# Patient Record
Sex: FEMALE | Race: WHITE | NOT HISPANIC OR LATINO
[De-identification: names, ages, dates, MRNs, and addresses within clinical notes are randomized per-mention and may not be internally consistent; named-entity substitution may affect disease eponyms.]

---

## 2017-01-10 ENCOUNTER — RX RENEWAL (OUTPATIENT)
Age: 72
End: 2017-01-10

## 2017-01-25 ENCOUNTER — APPOINTMENT (OUTPATIENT)
Dept: GASTROENTEROLOGY | Facility: CLINIC | Age: 72
End: 2017-01-25

## 2017-01-25 VITALS
HEART RATE: 76 BPM | TEMPERATURE: 98.2 F | OXYGEN SATURATION: 99 % | RESPIRATION RATE: 14 BRPM | DIASTOLIC BLOOD PRESSURE: 70 MMHG | SYSTOLIC BLOOD PRESSURE: 134 MMHG | BODY MASS INDEX: 20.83 KG/M2 | HEIGHT: 65 IN | WEIGHT: 125 LBS

## 2017-02-08 ENCOUNTER — APPOINTMENT (OUTPATIENT)
Age: 72
End: 2017-02-08

## 2017-03-19 ENCOUNTER — TRANSCRIPTION ENCOUNTER (OUTPATIENT)
Age: 72
End: 2017-03-19

## 2017-05-02 ENCOUNTER — MESSAGE (OUTPATIENT)
Age: 72
End: 2017-05-02

## 2017-05-03 ENCOUNTER — APPOINTMENT (OUTPATIENT)
Dept: INTERNAL MEDICINE | Facility: CLINIC | Age: 72
End: 2017-05-03

## 2017-05-03 VITALS — BODY MASS INDEX: 21.33 KG/M2 | WEIGHT: 128 LBS | HEIGHT: 65 IN

## 2017-05-03 VITALS — HEIGHT: 65 IN | BODY MASS INDEX: 21.33 KG/M2 | WEIGHT: 128 LBS

## 2017-05-03 VITALS — DIASTOLIC BLOOD PRESSURE: 68 MMHG | SYSTOLIC BLOOD PRESSURE: 138 MMHG | RESPIRATION RATE: 14 BRPM | HEART RATE: 70 BPM

## 2017-06-28 ENCOUNTER — OTHER (OUTPATIENT)
Age: 72
End: 2017-06-28

## 2017-07-18 ENCOUNTER — APPOINTMENT (OUTPATIENT)
Dept: ENDOCRINOLOGY | Facility: CLINIC | Age: 72
End: 2017-07-18

## 2017-07-18 VITALS
OXYGEN SATURATION: 98 % | WEIGHT: 129 LBS | BODY MASS INDEX: 21.49 KG/M2 | DIASTOLIC BLOOD PRESSURE: 62 MMHG | HEIGHT: 65 IN | HEART RATE: 74 BPM | SYSTOLIC BLOOD PRESSURE: 126 MMHG

## 2017-07-18 RX ORDER — ESTRADIOL 0.1 MG/G
0.1 CREAM VAGINAL
Qty: 42 | Refills: 0 | Status: COMPLETED | COMMUNITY
Start: 2017-01-26

## 2017-07-21 ENCOUNTER — RX RENEWAL (OUTPATIENT)
Age: 72
End: 2017-07-21

## 2017-09-17 ENCOUNTER — TRANSCRIPTION ENCOUNTER (OUTPATIENT)
Age: 72
End: 2017-09-17

## 2017-10-16 ENCOUNTER — RX RENEWAL (OUTPATIENT)
Age: 72
End: 2017-10-16

## 2017-11-08 ENCOUNTER — APPOINTMENT (OUTPATIENT)
Dept: INTERNAL MEDICINE | Facility: CLINIC | Age: 72
End: 2017-11-08
Payer: MEDICARE

## 2017-11-08 VITALS — SYSTOLIC BLOOD PRESSURE: 134 MMHG | HEART RATE: 78 BPM | DIASTOLIC BLOOD PRESSURE: 76 MMHG

## 2017-11-08 VITALS
BODY MASS INDEX: 21.16 KG/M2 | DIASTOLIC BLOOD PRESSURE: 70 MMHG | HEART RATE: 74 BPM | SYSTOLIC BLOOD PRESSURE: 126 MMHG | WEIGHT: 127 LBS | HEIGHT: 65 IN

## 2017-11-08 PROCEDURE — G0439: CPT

## 2017-11-09 LAB
ANION GAP SERPL CALC-SCNC: 13 MMOL/L
BUN SERPL-MCNC: 16 MG/DL
CALCIUM SERPL-MCNC: 10.4 MG/DL
CHLORIDE SERPL-SCNC: 97 MMOL/L
CO2 SERPL-SCNC: 29 MMOL/L
CREAT SERPL-MCNC: 0.85 MG/DL
GLUCOSE SERPL-MCNC: 78 MG/DL
POTASSIUM SERPL-SCNC: 4.2 MMOL/L
SODIUM SERPL-SCNC: 139 MMOL/L

## 2018-05-09 ENCOUNTER — APPOINTMENT (OUTPATIENT)
Dept: INTERNAL MEDICINE | Facility: CLINIC | Age: 73
End: 2018-05-09
Payer: MEDICARE

## 2018-05-09 VITALS
DIASTOLIC BLOOD PRESSURE: 70 MMHG | BODY MASS INDEX: 21.16 KG/M2 | SYSTOLIC BLOOD PRESSURE: 140 MMHG | HEIGHT: 65 IN | HEART RATE: 81 BPM | WEIGHT: 127 LBS

## 2018-05-09 VITALS — HEART RATE: 14 BPM | RESPIRATION RATE: 14 BRPM | DIASTOLIC BLOOD PRESSURE: 70 MMHG | SYSTOLIC BLOOD PRESSURE: 134 MMHG

## 2018-05-09 PROCEDURE — 99214 OFFICE O/P EST MOD 30 MIN: CPT

## 2018-05-09 PROCEDURE — G0444 DEPRESSION SCREEN ANNUAL: CPT | Mod: 59

## 2018-05-11 ENCOUNTER — RX RENEWAL (OUTPATIENT)
Age: 73
End: 2018-05-11

## 2018-07-18 ENCOUNTER — RX RENEWAL (OUTPATIENT)
Age: 73
End: 2018-07-18

## 2018-07-18 ENCOUNTER — APPOINTMENT (OUTPATIENT)
Dept: ENDOCRINOLOGY | Facility: CLINIC | Age: 73
End: 2018-07-18
Payer: MEDICARE

## 2018-07-18 VITALS
HEIGHT: 65 IN | OXYGEN SATURATION: 98 % | WEIGHT: 128 LBS | HEART RATE: 81 BPM | BODY MASS INDEX: 21.33 KG/M2 | SYSTOLIC BLOOD PRESSURE: 102 MMHG | DIASTOLIC BLOOD PRESSURE: 68 MMHG

## 2018-07-18 PROCEDURE — 99214 OFFICE O/P EST MOD 30 MIN: CPT | Mod: 25

## 2018-07-18 PROCEDURE — 77080 DXA BONE DENSITY AXIAL: CPT | Mod: GA

## 2018-07-18 RX ORDER — OXYBUTYNIN CHLORIDE 15 MG/1
15 TABLET, EXTENDED RELEASE ORAL
Qty: 90 | Refills: 0 | Status: DISCONTINUED | COMMUNITY
Start: 2017-02-14 | End: 2018-07-18

## 2018-10-22 ENCOUNTER — RX RENEWAL (OUTPATIENT)
Age: 73
End: 2018-10-22

## 2018-11-14 ENCOUNTER — APPOINTMENT (OUTPATIENT)
Dept: INTERNAL MEDICINE | Facility: CLINIC | Age: 73
End: 2018-11-14
Payer: MEDICARE

## 2018-11-14 VITALS — RESPIRATION RATE: 12 BRPM | SYSTOLIC BLOOD PRESSURE: 134 MMHG | DIASTOLIC BLOOD PRESSURE: 68 MMHG | HEART RATE: 78 BPM

## 2018-11-14 VITALS
OXYGEN SATURATION: 98 % | HEART RATE: 89 BPM | BODY MASS INDEX: 21.33 KG/M2 | SYSTOLIC BLOOD PRESSURE: 110 MMHG | HEIGHT: 65 IN | WEIGHT: 128 LBS | DIASTOLIC BLOOD PRESSURE: 64 MMHG

## 2018-11-14 PROCEDURE — G0439: CPT

## 2018-11-15 LAB
ANION GAP SERPL CALC-SCNC: 13 MMOL/L
BUN SERPL-MCNC: 13 MG/DL
CALCIUM SERPL-MCNC: 9.9 MG/DL
CHLORIDE SERPL-SCNC: 98 MMOL/L
CHOLEST SERPL-MCNC: 207 MG/DL
CHOLEST/HDLC SERPL: 3.8 RATIO
CO2 SERPL-SCNC: 29 MMOL/L
CREAT SERPL-MCNC: 0.58 MG/DL
FRUCTOSAMINE SERPL-MCNC: 271 UMOL/L
GLUCOSE SERPL-MCNC: 101 MG/DL
HDLC SERPL-MCNC: 54 MG/DL
LDLC SERPL CALC-MCNC: 132 MG/DL
POTASSIUM SERPL-SCNC: 3.7 MMOL/L
SODIUM SERPL-SCNC: 140 MMOL/L
T4 FREE SERPL-MCNC: 1.3 NG/DL
TRIGL SERPL-MCNC: 103 MG/DL
TSH SERPL-ACNC: 5.1 UIU/ML

## 2018-11-15 NOTE — HISTORY OF PRESENT ILLNESS
[FreeTextEntry1] : 74 yo for scheduled annual wellness visit and f/u of irritable bowel, GERD, reactive hypertension, and osteoporosis [de-identified] : Last seen in May, 2018. Patient has been generally well without any significant intercurrent issues or problems. Adherent with medications as noted without side effects. Appetite good and weight reportedly stable. Active with good exercise tolerance. No sx of chest pain, sob, palpitations, orthopnea, PND, HECTOR, edema, lightheadedness..\par \par Active- two young grandchildren- walks 2 miles daily- active during the day- good tolerance

## 2018-11-15 NOTE — ASSESSMENT
[FreeTextEntry1] : 1. HCM--immun- ok, Shingrix discussed- had flu shot this season\par  last colon 10/16 and neg- f/u 10 yrs\par  mammo June 2018- neg\par  gyn- Jan 2018 Dr.Steven Babcock- issue of terminating screen\par  normal ecg in 12/10\par  Hep C screening neg 9/12\par  TSH 9/16 last- borderline high with normal free t3/t4- monitor expectantly- asx- repeat today\par  Depression screening- negative\par  HIV screening offered and deferred\par \par  2. Hyperlipidemia- diet controlled- last - calculated CV Pooled Risk Cohort greater than 5-7.5. However had cardiac ct in June 2014 with clean coronaries- pt against adding a statin and as no cad and as over aged 65,hard to argue that needs medication\par \par  3. h/o IFG- HBA1 6.1 and FBS 98 in Sept 2016- + fam hx of DM- adheres with healthy lifestyle No sx of Diabetes- check glucose today with fructosamine\par  \par  4.GI- IBS- colitis 2012- presumed infectious- treated- resolved. CT abd- neg. EGD in 9/12- inflammation- EGD and colon 10/16- essentially neg- see reports. Nutritionist support in past\par  \par  5. Osteoporosis- on fosamax from 1/07 and improved DEXA 2009. Off fosamax since 4/10 because of GERD sx- Boniva started 8/10 with the intention to treat for at least 5 years total of bisphosphonates. ? whether the use of medication was in any way is contributing to GI sx and considered alternate treatments- i.e reclast.. Off Boniva in June 2012 with improved GI sx-\par  \par Essentially had 5 years of treatment- d/c'd in June 2012 after DEXA results- continued drug holiday and repeated DEXA in June 2014 - basically stable- Repeat DEXA 6/16- worsening T scores in fem neck with T -2.4- unable to resume bisphosphonate for GI issues- Saw Dr Sierra in consultation July 2017 and had DEXA repeated- advised continued drug holiday and repeat testing in one year. Seen again 7/18 with BMD indicating slight decrease in density in fem neck- advised continued drug holiday and f/u in one year- i.e. July 2019\par  \par  6. remote h/o melanoma 1991- and basal cell- sees derm Dr. Frost--last appt September 2018 goes every 6 mos-\par  \par  7. Hypertension with reactive component- Had normal 24 hour ambulatory BP monitor in remote past and repeated again in 9/13- only 4/35 daytime readings above 140 (11%)- and highest systolic . Given current guidelines and after discussion with pt opted for continued monitoring without antihypertensive meds.. In fall, 2012- neg urine ma and normal echo- no LVH. No clinical TOD. 24 hour ambu BP monitor repeated 10/15- overall average /67- 18% of readings above optimal for systolic- 163 systolic the highest.; lowest systolic 88 at 3:15 am. Pt not a dipper.\par \par  Chlorthalidone 12.5 (0.5 25 mg tab) started 7/15. Home monitoring all with systolics less than 140 BP- mostly in 120's systolic-  Home cuff  assessed as accurate in office. Continue current meds and monitoring\par \par 6. Atypical Chest Pain/GERD- neg Cardiac CT in 2014 with clean coronaries- sx felt GI related. Minimal sx currently\par \par 7. Fall with head trauma but no sequela- 3/17- Fall prevention reviewed AT LENGTH- and recent fall 3/18- tripped on dog's leash-APril 2018- missed step at home- landed on toes-  FALL PREVENTION REVIEWED AT LENGTH\par \par  f/u 6 mos or prn- labs - will call- wants another opinion re: cataract surgery- \par .

## 2019-01-07 ENCOUNTER — CHART COPY (OUTPATIENT)
Age: 74
End: 2019-01-07

## 2019-01-30 ENCOUNTER — APPOINTMENT (OUTPATIENT)
Dept: GASTROENTEROLOGY | Facility: CLINIC | Age: 74
End: 2019-01-30
Payer: MEDICARE

## 2019-01-30 VITALS
HEIGHT: 65 IN | TEMPERATURE: 98 F | SYSTOLIC BLOOD PRESSURE: 136 MMHG | OXYGEN SATURATION: 98 % | WEIGHT: 127 LBS | BODY MASS INDEX: 21.16 KG/M2 | HEART RATE: 82 BPM | DIASTOLIC BLOOD PRESSURE: 76 MMHG

## 2019-01-30 PROCEDURE — 99214 OFFICE O/P EST MOD 30 MIN: CPT

## 2019-01-30 NOTE — HISTORY OF PRESENT ILLNESS
[FreeTextEntry1] : Syeda Presents for followup visit. She relates that after Joanne she developed burning sensation in her upper abdomen as well as the lower abdomen. The heartburn is well controlled on omeprazole 20 mg every other day and ranitidine 300 mg q.h.s. In the past when she tried to increase stretch the omeprazole to every 3 days she would develop heartburn symptoms. She does have a history of osteoporosis. After Joanne she developed low abdominal cramping, gas and bloating. She has irregular bowel movements alternating between diarrhea and constipation. This morning she had diarrhea 4 times. No rectal bleeding or melena. She cannot figure out foods that aggravate her GI symptoms. She is limiting her intake of dairy and carbohydrates. She has seen a nutritionist several times. She denies weight loss

## 2019-01-30 NOTE — PHYSICAL EXAM
[General Appearance - Alert] : alert [General Appearance - In No Acute Distress] : in no acute distress [Auscultation Breath Sounds / Voice Sounds] : lungs were clear to auscultation bilaterally [Heart Rate And Rhythm] : heart rate was normal and rhythm regular [Heart Sounds] : normal S1 and S2 [Heart Sounds Gallop] : no gallops [Murmurs] : no murmurs [Heart Sounds Pericardial Friction Rub] : no pericardial rub [Edema] : there was no peripheral edema [Bowel Sounds] : normal bowel sounds [Abdomen Soft] : soft [Abdomen Tenderness] : non-tender [] : no hepato-splenomegaly [Abdomen Mass (___ Cm)] : no abdominal mass palpated [Skin Color & Pigmentation] : normal skin color and pigmentation [No Focal Deficits] : no focal deficits

## 2019-01-30 NOTE — ASSESSMENT
[FreeTextEntry1] : This is a 73-year-old female with chronic GERD on chronic PPI therapy. She has history of osteoporosis. I recommend cutting down the omeprazole to Nexium 10 mg every other day and continue on ranitidine 300 mg q.h.s. For the IBS, I recommend stopping and using supplements especially in days when she has diarrhea. She is currently taking a probiotic once a day but the instructions on this particular probiotic suggests 3 times a day with meals. Recommend maximizing her probiotics to 3 times a day with meals as suggested by the . She continues to have GI symptoms, consideration be given to trial of Xifaxan 550 mg tid for 14 days. She is to call if she has questions or concerns

## 2019-01-31 ENCOUNTER — CLINICAL ADVICE (OUTPATIENT)
Age: 74
End: 2019-01-31

## 2019-02-01 ENCOUNTER — CLINICAL ADVICE (OUTPATIENT)
Age: 74
End: 2019-02-01

## 2019-02-05 ENCOUNTER — APPOINTMENT (OUTPATIENT)
Dept: OPHTHALMOLOGY | Facility: CLINIC | Age: 74
End: 2019-02-05
Payer: MEDICARE

## 2019-02-05 PROCEDURE — 92136 OPHTHALMIC BIOMETRY: CPT

## 2019-02-05 PROCEDURE — 92004 COMPRE OPH EXAM NEW PT 1/>: CPT

## 2019-02-26 ENCOUNTER — APPOINTMENT (OUTPATIENT)
Dept: OPHTHALMOLOGY | Facility: CLINIC | Age: 74
End: 2019-02-26
Payer: MEDICARE

## 2019-02-26 PROCEDURE — 92012 INTRM OPH EXAM EST PATIENT: CPT

## 2019-02-27 ENCOUNTER — APPOINTMENT (OUTPATIENT)
Dept: INTERNAL MEDICINE | Facility: CLINIC | Age: 74
End: 2019-02-27
Payer: MEDICARE

## 2019-02-27 ENCOUNTER — NON-APPOINTMENT (OUTPATIENT)
Age: 74
End: 2019-02-27

## 2019-02-27 VITALS
SYSTOLIC BLOOD PRESSURE: 138 MMHG | BODY MASS INDEX: 21.33 KG/M2 | WEIGHT: 128 LBS | HEART RATE: 78 BPM | OXYGEN SATURATION: 98 % | HEIGHT: 65 IN | DIASTOLIC BLOOD PRESSURE: 80 MMHG

## 2019-02-27 DIAGNOSIS — Z87.19 PERSONAL HISTORY OF OTHER DISEASES OF THE DIGESTIVE SYSTEM: ICD-10-CM

## 2019-02-27 PROCEDURE — 93000 ELECTROCARDIOGRAM COMPLETE: CPT

## 2019-02-27 PROCEDURE — 99214 OFFICE O/P EST MOD 30 MIN: CPT | Mod: 25

## 2019-02-27 RX ORDER — MAGNESIUM OXIDE/MAG AA CHELATE 300 MG
CAPSULE ORAL
Refills: 0 | Status: DISCONTINUED | COMMUNITY
End: 2019-02-27

## 2019-02-27 RX ORDER — PSYLLIUM HUSK 0.4 G
CAPSULE ORAL
Refills: 0 | Status: COMPLETED | COMMUNITY
End: 2019-02-27

## 2019-02-27 RX ORDER — RIFAXIMIN 550 MG/1
550 TABLET ORAL
Qty: 42 | Refills: 0 | Status: DISCONTINUED | COMMUNITY
Start: 2019-01-30 | End: 2019-02-27

## 2019-02-27 RX ORDER — ESOMEPRAZOLE MAGNESIUM 10 MG/1
10 GRANULE, DELAYED RELEASE ORAL DAILY
Qty: 30 | Refills: 5 | Status: DISCONTINUED | COMMUNITY
Start: 2019-01-30 | End: 2019-02-27

## 2019-02-27 NOTE — HISTORY OF PRESENT ILLNESS
[No Pertinent Cardiac History] : no history of aortic stenosis, atrial fibrillation, coronary artery disease, recent myocardial infarction, or implantable device/pacemaker [No Pertinent Pulmonary History] : no history of asthma, COPD, sleep apnea, or smoking [No Adverse Anesthesia Reaction] : no adverse anesthesia reaction in self or family member [(Patient denies any chest pain, claudication, dyspnea on exertion, orthopnea, palpitations or syncope)] : Patient denies any chest pain, claudication, dyspnea on exertion, orthopnea, palpitations or syncope [Excellent (>10 METs)] : Excellent (>10 METs) [Aortic Stenosis] : no aortic stenosis [Atrial Fibrillation] : no atrial fibrillation [Coronary Artery Disease] : no coronary artery disease [Recent Myocardial Infarction] : no recent myocardial infarction [Implantable Device/Pacemaker] : no implantable device/pacemaker [Asthma] : no asthma [COPD] : no COPD [Sleep Apnea] : no sleep apnea [Smoker] : not a smoker [Family Member] : no family member with adverse anesthesia reaction/sudden death [Self] : no previous adverse anesthesia reaction [FreeTextEntry1] : Left Eye [FreeTextEntry2] : March 14th [place] [FreeTextEntry3] : Dr Cotto [FreeTextEntry4] : For left eye cataract surgery and lens impalmtation. SEe notes from optho.\par \par Patient has been generally well without any significant intercurrent issues or problems. Adherent with medications as noted without side effects. Appetite good and weight reportedly stable. Active with good exercise tolerance. No sx of chest pain, sob, palpitations, orthopnea, PND, HECTOR, edema, lightheadedness..\par \par Does have pain in left LE- starts in upper thigh- anterolateral- radiates to knee- not below- rarely on right leg. No trauma- alleviated by stretching and turning. Had remote EMG/NCT by Neuro for LE sx weakness- negative and resolved Had also seen [facility]- Dr Jacinot- LaBarca- ? Fibromylagia

## 2019-02-27 NOTE — REVIEW OF SYSTEMS
[Heartburn] : heartburn [Incontinence] : incontinence [Negative] : Heme/Lymph [Eyesight Problems] : eyesight problems [see HPI] : see HPI

## 2019-02-27 NOTE — ASSESSMENT
[High Risk Surgery - Intraperitoneal, Intrathoracic or Supringuinal Vascular Procedures] : High Risk Surgery - Intraperitoneal, Intrathoracic or Supringuinal Vascular Procedures - No (0) [Ischemic Heart Disease] : Ischemic Heart Disease - No (0) [Congestive Heart Failure] : Congestive Heart Failure - No (0) [Prior Cerebrovascular Accident or TIA] : Prior Cerebrovascular Accident or TIA - No (0) [Creatinine >= 2mg/dL (1 Point)] : Creatinine >= 2mg/dL - No (0) [Insulin-dependent Diabetic (1 Point)] : Insulin-dependent Diabetic - No (0) [0] : 0 , RCRI Class: I, Risk of Post-Op Cardiac Complications: 0.4%, Procedure Risk: Low-Risk [Patient Optimized for Surgery] : Patient optimized for surgery [ECG] : ECG [Modify medications prior to procedure] : Modify medications prior to procedure [As per surgery] : as per surgery [FreeTextEntry7] : take PPI/H2 blocker in am with a sip of water; hold chlorthalidone day of surgery

## 2019-02-27 NOTE — PHYSICAL EXAM

## 2019-03-08 ENCOUNTER — MEDICATION RENEWAL (OUTPATIENT)
Age: 74
End: 2019-03-08

## 2019-03-13 ENCOUNTER — TRANSCRIPTION ENCOUNTER (OUTPATIENT)
Age: 74
End: 2019-03-13

## 2019-03-14 ENCOUNTER — APPOINTMENT (OUTPATIENT)
Dept: OPHTHALMOLOGY | Facility: HOSPITAL | Age: 74
End: 2019-03-14
Payer: MEDICARE

## 2019-03-14 ENCOUNTER — OUTPATIENT (OUTPATIENT)
Dept: OUTPATIENT SERVICES | Facility: HOSPITAL | Age: 74
LOS: 1 days | End: 2019-03-14
Payer: MEDICARE

## 2019-03-14 VITALS
RESPIRATION RATE: 19 BRPM | TEMPERATURE: 98 F | WEIGHT: 127.43 LBS | HEIGHT: 65 IN | HEART RATE: 73 BPM | DIASTOLIC BLOOD PRESSURE: 83 MMHG | SYSTOLIC BLOOD PRESSURE: 146 MMHG | OXYGEN SATURATION: 100 %

## 2019-03-14 VITALS
RESPIRATION RATE: 17 BRPM | OXYGEN SATURATION: 100 % | HEART RATE: 68 BPM | DIASTOLIC BLOOD PRESSURE: 74 MMHG | SYSTOLIC BLOOD PRESSURE: 134 MMHG

## 2019-03-14 DIAGNOSIS — Q76.1 KLIPPEL-FEIL SYNDROME: Chronic | ICD-10-CM

## 2019-03-14 DIAGNOSIS — H25.812 COMBINED FORMS OF AGE-RELATED CATARACT, LEFT EYE: ICD-10-CM

## 2019-03-14 DIAGNOSIS — Z98.890 OTHER SPECIFIED POSTPROCEDURAL STATES: Chronic | ICD-10-CM

## 2019-03-14 DIAGNOSIS — Z85.828 PERSONAL HISTORY OF OTHER MALIGNANT NEOPLASM OF SKIN: Chronic | ICD-10-CM

## 2019-03-14 PROCEDURE — V2787: CPT

## 2019-03-14 PROCEDURE — 66984 XCAPSL CTRC RMVL W/O ECP: CPT | Mod: LT

## 2019-03-14 PROCEDURE — V2787: CPT | Mod: LT

## 2019-03-14 NOTE — ASU PATIENT PROFILE, ADULT - PMH
Basal cell carcinoma    GERD (gastroesophageal reflux disease)    HLD (hyperlipidemia)    IBS (irritable bowel syndrome)    IFG (impaired fasting glucose)    Reactive hypertension

## 2019-03-14 NOTE — ASU DISCHARGE PLAN (ADULT/PEDIATRIC) - CARE PROVIDER_API CALL
Ramana Cotto)  Ophthalmology  [address] [place] [address], [address]  [place] [address]  Phone: [phone]  Fax: [phone]  Follow Up Time:

## 2019-03-14 NOTE — ASU PATIENT PROFILE, ADULT - HEALTHCARE QUESTIONS, PROFILE
pt spoke with dr serrano and anesthesia [Well Nourished] : well nourished [Interactive] : interactive [Obese] : obese [Acanthosis Nigricans___] : acanthosis nigricans over [unfilled] [Normal Appearance] : normal appearance [Well formed] : well formed [Normally Set] : normally set [Normal S1 and S2] : normal S1 and S2 [Clear to Ausculation Bilaterally] : clear to auscultation bilaterally [Abdomen Soft] : soft [Abdomen Tenderness] : non-tender [] : no hepatosplenomegaly [Normal] : normal  [1] : was Carlos Eduardo stage 1 [Carlos Eduardo Stage ___] : the Carlos Eduardo stage for breast development was [unfilled] [Murmur] : no murmurs

## 2019-03-14 NOTE — ASU PATIENT PROFILE, ADULT - PSH
Cervical fusion syndrome    H/O benign breast biopsy    History of Mohs micrographic surgery for skin cancer

## 2019-03-14 NOTE — ASU DISCHARGE PLAN (ADULT/PEDIATRIC) - CALL YOUR DOCTOR IF YOU HAVE ANY OF THE FOLLOWING:
Pain not relieved by Medications/Fever greater than (need to indicate Fahrenheit or Celsius)/Nausea and vomiting that does not stop/Bleeding that does not stop

## 2019-03-14 NOTE — ASU DISCHARGE PLAN (ADULT/PEDIATRIC) - PATIENT EDUCATION MATERIALS PROVIED
Provider pre-printed instructions given Provider pre-printed instructions given/Implant card (specify)/Intraocular lens implant(IOL), Eye shield with instructions , sunglasses and eye kit given to patient./Other (specify)

## 2019-03-15 ENCOUNTER — APPOINTMENT (OUTPATIENT)
Dept: OPHTHALMOLOGY | Facility: CLINIC | Age: 74
End: 2019-03-15
Payer: MEDICARE

## 2019-03-15 PROCEDURE — 99024 POSTOP FOLLOW-UP VISIT: CPT

## 2019-03-18 ENCOUNTER — MESSAGE (OUTPATIENT)
Age: 74
End: 2019-03-18

## 2019-03-22 ENCOUNTER — APPOINTMENT (OUTPATIENT)
Dept: OPHTHALMOLOGY | Facility: CLINIC | Age: 74
End: 2019-03-22
Payer: MEDICARE

## 2019-03-22 PROBLEM — C44.91 BASAL CELL CARCINOMA OF SKIN, UNSPECIFIED: Chronic | Status: ACTIVE | Noted: 2019-03-14

## 2019-03-22 PROBLEM — K21.9 GASTRO-ESOPHAGEAL REFLUX DISEASE WITHOUT ESOPHAGITIS: Chronic | Status: ACTIVE | Noted: 2019-03-14

## 2019-03-22 PROBLEM — K58.9 IRRITABLE BOWEL SYNDROME, UNSPECIFIED: Chronic | Status: ACTIVE | Noted: 2019-03-14

## 2019-03-22 PROBLEM — K58.9 IRRITABLE BOWEL SYNDROME WITHOUT DIARRHEA: Chronic | Status: ACTIVE | Noted: 2019-03-14

## 2019-03-22 PROBLEM — R73.01 IMPAIRED FASTING GLUCOSE: Chronic | Status: ACTIVE | Noted: 2019-03-14

## 2019-03-22 PROCEDURE — 99024 POSTOP FOLLOW-UP VISIT: CPT

## 2019-03-28 ENCOUNTER — MEDICATION RENEWAL (OUTPATIENT)
Age: 74
End: 2019-03-28

## 2019-04-01 ENCOUNTER — APPOINTMENT (OUTPATIENT)
Dept: INTERNAL MEDICINE | Facility: CLINIC | Age: 74
End: 2019-04-01
Payer: MEDICARE

## 2019-04-01 VITALS
HEART RATE: 95 BPM | SYSTOLIC BLOOD PRESSURE: 144 MMHG | HEIGHT: 65 IN | BODY MASS INDEX: 21.33 KG/M2 | OXYGEN SATURATION: 96 % | WEIGHT: 128 LBS | DIASTOLIC BLOOD PRESSURE: 66 MMHG

## 2019-04-01 VITALS — DIASTOLIC BLOOD PRESSURE: 70 MMHG | SYSTOLIC BLOOD PRESSURE: 130 MMHG

## 2019-04-01 PROCEDURE — 99213 OFFICE O/P EST LOW 20 MIN: CPT

## 2019-04-01 NOTE — REVIEW OF SYSTEMS
[Fever] : no fever [Pain] : no pain [Sore Throat] : no sore throat [Chest Pain] : no chest pain [Palpitations] : no palpitations [Leg Claudication] : no leg claudication [Lower Ext Edema] : no lower extremity edema [Orthopnea] : no orthopnea [Paroysmal Nocturnal Dyspnea] : no paroysmal nocturnal dyspnea [Shortness Of Breath] : no shortness of breath [Cough] : no cough [Dyspnea on Exertion] : no dyspnea on exertion [Abdominal Pain] : no abdominal pain [Diarrhea] : diarrhea [Dysuria] : no dysuria [Joint Swelling] : no joint swelling [Skin Rash] : no skin rash [Dizziness] : no dizziness [Fainting] : no fainting [Easy Bleeding] : no easy bleeding [Easy Bruising] : no easy bruising [de-identified] : pt denied any history of easy bleeding or easy bruising

## 2019-04-01 NOTE — RESULTS/DATA
[] : results reviewed [ECG Reviewed] : reviewed [Normal] : The 12 - lead ECG is normal [No Acute Ischemia] : No acute ischemia

## 2019-04-01 NOTE — HISTORY OF PRESENT ILLNESS
[No Pertinent Cardiac History] : no history of aortic stenosis, atrial fibrillation, coronary artery disease, recent myocardial infarction, or implantable device/pacemaker [No Pertinent Pulmonary History] : no history of asthma, COPD, sleep apnea, or smoking [No Adverse Anesthesia Reaction] : no adverse anesthesia reaction in self or family member [(Patient denies any chest pain, claudication, dyspnea on exertion, orthopnea, palpitations or syncope)] : Patient denies any chest pain, claudication, dyspnea on exertion, orthopnea, palpitations or syncope [Good (7-10 METs)] : Good (7-10 METs) [Aortic Stenosis] : no aortic stenosis [Atrial Fibrillation] : no atrial fibrillation [Coronary Artery Disease] : no coronary artery disease [Recent Myocardial Infarction] : no recent myocardial infarction [Implantable Device/Pacemaker] : no implantable device/pacemaker [Asthma] : no asthma [COPD] : no COPD [Sleep Apnea] : no sleep apnea [Smoker] : not a smoker [Family Member] : no family member with adverse anesthesia reaction/sudden death [Self] : no previous adverse anesthesia reaction [Chronic Anticoagulation] : no chronic anticoagulation [Chronic Kidney Disease] : no chronic kidney disease [Diabetes] : no diabetes [FreeTextEntry1] : RIGHT eye cataract surgery [FreeTextEntry2] : 4/4/19 [FreeTextEntry3] : Dr Cotto [FreeTextEntry4] : \par Left eye cataract surgery was successful on 3/14 without any issues. Pt reports feeling well and offers no complaints.  [FreeTextEntry7] : EKG 2/27/19 - unremarkable [FreeTextEntry8] : pt reports she can walk 4 blocks or do 2 flights of stairs without any exertional issues and walks a couple of miles every morning without any problems and no CP/SOB

## 2019-04-01 NOTE — ASSESSMENT
[High Risk Surgery - Intraperitoneal, Intrathoracic or Supringuinal Vascular Procedures] : High Risk Surgery - Intraperitoneal, Intrathoracic or Supringuinal Vascular Procedures - No (0) [Ischemic Heart Disease] : Ischemic Heart Disease - No (0) [Congestive Heart Failure] : Congestive Heart Failure - No (0) [Prior Cerebrovascular Accident or TIA] : Prior Cerebrovascular Accident or TIA - No (0) [Creatinine >= 2mg/dL (1 Point)] : Creatinine >= 2mg/dL - No (0) [Insulin-dependent Diabetic (1 Point)] : Insulin-dependent Diabetic - No (0) [0] : 0 , RCRI Class: I, Risk of Post-Op Cardiac Complications: 0.4%, Procedure Risk: Low-Risk [Patient Optimized for Surgery] : Patient optimized for surgery [No Further Testing Recommended] : no further testing recommended [Modify medications prior to procedure] : Modify medications prior to procedure [As per surgery] : as per surgery [FreeTextEntry4] : advised patient she is a low risk patient going for a low risk procedure and that nobody is zero risk and nothing is zero risk [FreeTextEntry7] : HOLDing chlorthalidone the day of procedure, no nsaids, and  no herbals/multivitamins prior and only tylenol PRN pain

## 2019-04-01 NOTE — PLAN
[FreeTextEntry1] : advised f/u with Surgeon post-op and with us PRN. pt will see her PMD next month for her scheduled CPE

## 2019-04-01 NOTE — PHYSICAL EXAM
[No Acute Distress] : no acute distress [Well Developed] : well developed [Well-Appearing] : well-appearing [Normal Sclera/Conjunctiva] : normal sclera/conjunctiva [PERRL] : pupils equal round and reactive to light [EOMI] : extraocular movements intact [Normal Oropharynx] : the oropharynx was normal [Supple] : supple [No Lymphadenopathy] : no lymphadenopathy [No Respiratory Distress] : no respiratory distress  [Clear to Auscultation] : lungs were clear to auscultation bilaterally [No Accessory Muscle Use] : no accessory muscle use [Normal Rate] : normal rate  [Regular Rhythm] : with a regular rhythm [Normal S1, S2] : normal S1 and S2 [No Murmur] : no murmur heard [No Edema] : there was no peripheral edema [Soft] : abdomen soft [Non Tender] : non-tender [Normal Bowel Sounds] : normal bowel sounds [Normal Supraclavicular Nodes] : no supraclavicular lymphadenopathy [Normal Posterior Cervical Nodes] : no posterior cervical lymphadenopathy [Normal Anterior Cervical Nodes] : no anterior cervical lymphadenopathy [No Focal Deficits] : no focal deficits [Speech Grossly Normal] : speech grossly normal [Normal Affect] : the affect was normal [Alert and Oriented x3] : oriented to person, place, and time [Normal Mood] : the mood was normal [Normal Insight/Judgement] : insight and judgment were intact [Acne] : no acne [de-identified] : No calf tenderness bilaterally. Radial pulses +2 bilaterally  [de-identified] : normal turgor

## 2019-04-02 ENCOUNTER — MEDICATION RENEWAL (OUTPATIENT)
Age: 74
End: 2019-04-02

## 2019-04-03 ENCOUNTER — TRANSCRIPTION ENCOUNTER (OUTPATIENT)
Age: 74
End: 2019-04-03

## 2019-04-04 ENCOUNTER — APPOINTMENT (OUTPATIENT)
Dept: OPHTHALMOLOGY | Facility: HOSPITAL | Age: 74
End: 2019-04-04

## 2019-04-04 ENCOUNTER — OUTPATIENT (OUTPATIENT)
Dept: OUTPATIENT SERVICES | Facility: HOSPITAL | Age: 74
LOS: 1 days | End: 2019-04-04
Payer: MEDICARE

## 2019-04-04 VITALS
HEART RATE: 77 BPM | RESPIRATION RATE: 17 BRPM | DIASTOLIC BLOOD PRESSURE: 68 MMHG | SYSTOLIC BLOOD PRESSURE: 127 MMHG | OXYGEN SATURATION: 100 %

## 2019-04-04 VITALS
WEIGHT: 125.66 LBS | DIASTOLIC BLOOD PRESSURE: 76 MMHG | TEMPERATURE: 98 F | SYSTOLIC BLOOD PRESSURE: 128 MMHG | HEART RATE: 79 BPM | HEIGHT: 65 IN | OXYGEN SATURATION: 99 % | RESPIRATION RATE: 19 BRPM

## 2019-04-04 DIAGNOSIS — Q76.1 KLIPPEL-FEIL SYNDROME: Chronic | ICD-10-CM

## 2019-04-04 DIAGNOSIS — Z98.42 CATARACT EXTRACTION STATUS, LEFT EYE: Chronic | ICD-10-CM

## 2019-04-04 DIAGNOSIS — Z98.890 OTHER SPECIFIED POSTPROCEDURAL STATES: Chronic | ICD-10-CM

## 2019-04-04 DIAGNOSIS — Z85.828 PERSONAL HISTORY OF OTHER MALIGNANT NEOPLASM OF SKIN: Chronic | ICD-10-CM

## 2019-04-04 DIAGNOSIS — H25.811 COMBINED FORMS OF AGE-RELATED CATARACT, RIGHT EYE: ICD-10-CM

## 2019-04-04 PROCEDURE — V2787: CPT

## 2019-04-04 PROCEDURE — 66984 XCAPSL CTRC RMVL W/O ECP: CPT | Mod: RT

## 2019-04-04 PROCEDURE — 66984 XCAPSL CTRC RMVL W/O ECP: CPT | Mod: RT,79

## 2019-04-04 NOTE — ASU DISCHARGE PLAN (ADULT/PEDIATRIC) - CARE PROVIDER_API CALL
Ramana Cotto)  Ophthalmology  [address] [place] [address], [facility] [address]  [place] [address]  Phone: [phone]  Fax: [phone]  Follow Up Time:

## 2019-04-04 NOTE — ASU DISCHARGE PLAN (ADULT/PEDIATRIC) - CALL YOUR DOCTOR IF YOU HAVE ANY OF THE FOLLOWING:
Increased irritability or sluggishness/Pain not relieved by Medications/Inability to tolerate liquids or foods/Bleeding that does not stop/Fever greater than (need to indicate Fahrenheit or Celsius)/Nausea and vomiting that does not stop

## 2019-04-04 NOTE — ASU PATIENT PROFILE, ADULT - PSH
Cervical fusion syndrome    H/O benign breast biopsy    H/O cataract extraction, left    History of Mohs micrographic surgery for skin cancer

## 2019-04-04 NOTE — ASU DISCHARGE PLAN (ADULT/PEDIATRIC) - ASU DC SPECIAL INSTRUCTIONSFT
do not rub eye. tylenol for discomfort.  avoid advil motrin aleve aspirin to decrease chance of bleeding. eye kit given to pt.  Discharge and follow up  instructions explained to pt.  pt understands proper use of eye drops and instructions.  f/u tomorrow@ 9am

## 2019-04-05 ENCOUNTER — APPOINTMENT (OUTPATIENT)
Dept: OPHTHALMOLOGY | Facility: CLINIC | Age: 74
End: 2019-04-05
Payer: MEDICARE

## 2019-04-05 PROCEDURE — 99024 POSTOP FOLLOW-UP VISIT: CPT

## 2019-04-11 ENCOUNTER — APPOINTMENT (OUTPATIENT)
Dept: OPHTHALMOLOGY | Facility: CLINIC | Age: 74
End: 2019-04-11
Payer: MEDICARE

## 2019-04-11 PROCEDURE — 99024 POSTOP FOLLOW-UP VISIT: CPT

## 2019-04-24 ENCOUNTER — RX RENEWAL (OUTPATIENT)
Age: 74
End: 2019-04-24

## 2019-05-06 ENCOUNTER — APPOINTMENT (OUTPATIENT)
Dept: RHEUMATOLOGY | Facility: CLINIC | Age: 74
End: 2019-05-06
Payer: MEDICARE

## 2019-05-06 VITALS
DIASTOLIC BLOOD PRESSURE: 75 MMHG | SYSTOLIC BLOOD PRESSURE: 124 MMHG | HEIGHT: 65 IN | TEMPERATURE: 98.8 F | OXYGEN SATURATION: 98 % | BODY MASS INDEX: 21.33 KG/M2 | WEIGHT: 128 LBS | HEART RATE: 79 BPM

## 2019-05-06 PROCEDURE — 99204 OFFICE O/P NEW MOD 45 MIN: CPT

## 2019-05-09 ENCOUNTER — TRANSCRIPTION ENCOUNTER (OUTPATIENT)
Age: 74
End: 2019-05-09

## 2019-05-14 ENCOUNTER — APPOINTMENT (OUTPATIENT)
Dept: OPHTHALMOLOGY | Facility: CLINIC | Age: 74
End: 2019-05-14
Payer: MEDICARE

## 2019-05-14 PROCEDURE — 99024 POSTOP FOLLOW-UP VISIT: CPT

## 2019-05-15 ENCOUNTER — APPOINTMENT (OUTPATIENT)
Dept: INTERNAL MEDICINE | Facility: CLINIC | Age: 74
End: 2019-05-15
Payer: MEDICARE

## 2019-05-15 VITALS
WEIGHT: 131 LBS | OXYGEN SATURATION: 99 % | HEIGHT: 65 IN | BODY MASS INDEX: 21.83 KG/M2 | TEMPERATURE: 98.8 F | DIASTOLIC BLOOD PRESSURE: 70 MMHG | HEART RATE: 89 BPM | SYSTOLIC BLOOD PRESSURE: 160 MMHG

## 2019-05-15 VITALS — DIASTOLIC BLOOD PRESSURE: 80 MMHG | RESPIRATION RATE: 14 BRPM | HEART RATE: 80 BPM | SYSTOLIC BLOOD PRESSURE: 150 MMHG

## 2019-05-15 DIAGNOSIS — H25.13 AGE-RELATED NUCLEAR CATARACT, BILATERAL: ICD-10-CM

## 2019-05-15 PROCEDURE — 99214 OFFICE O/P EST MOD 30 MIN: CPT

## 2019-05-15 RX ORDER — MULTIVITAMIN
TABLET ORAL
Refills: 0 | Status: DISCONTINUED | COMMUNITY

## 2019-05-15 RX ORDER — MOXIFLOXACIN OPHTHALMIC 5 MG/ML
0.5 SOLUTION/ DROPS OPHTHALMIC 4 TIMES DAILY
Qty: 1 | Refills: 2 | Status: DISCONTINUED | COMMUNITY
Start: 2019-03-08 | End: 2019-05-15

## 2019-05-15 RX ORDER — OFLOXACIN 3 MG/ML
0.3 SOLUTION/ DROPS OPHTHALMIC 4 TIMES DAILY
Qty: 1 | Refills: 2 | Status: DISCONTINUED | COMMUNITY
Start: 2019-04-02 | End: 2019-05-15

## 2019-05-15 RX ORDER — CHROMIUM 200 MCG
1000 TABLET ORAL
Refills: 0 | Status: DISCONTINUED | COMMUNITY

## 2019-05-15 RX ORDER — MOXIFLOXACIN OPHTHALMIC 5 MG/ML
0.5 SOLUTION/ DROPS OPHTHALMIC 4 TIMES DAILY
Qty: 1 | Refills: 2 | Status: DISCONTINUED | COMMUNITY
Start: 2019-03-28 | End: 2019-05-15

## 2019-05-15 RX ORDER — PREDNISOLONE ACETATE 10 MG/ML
1 SUSPENSION/ DROPS OPHTHALMIC 4 TIMES DAILY
Qty: 1 | Refills: 2 | Status: DISCONTINUED | COMMUNITY
Start: 2019-03-28 | End: 2019-05-15

## 2019-05-15 RX ORDER — PREDNISOLONE ACETATE 10 MG/ML
1 SUSPENSION/ DROPS OPHTHALMIC 4 TIMES DAILY
Qty: 1 | Refills: 2 | Status: DISCONTINUED | COMMUNITY
Start: 2019-03-08 | End: 2019-05-15

## 2019-05-15 NOTE — ASSESSMENT
[FreeTextEntry1] : 1. HCM--immun- ok, Shingrix discussed\par  last colon 10/16 and neg- f/u 10 yrs\par  mammo June 2018- neg\par  gyn- Jan 2018 Dr.Steven Babcock- issue of terminating screen\par  normal ecg in 2/19\par  Hep C screening neg 9/12\par  TSH 11/18 last- borderline high with normal free t3/t4- monitor expectantly- asx\par  Depression screening- negative\par  HIV screening offered and deferred\par \par  2. Hyperlipidemia- diet controlled- last - calculated CV Pooled Risk Cohort greater than 5-7.5. However had cardiac ct in June 2014 with clean coronaries- pt against adding a statin and as no cad and as over aged 67,hard to argue that needs medication\par \par  3. h/o IFG- HBA1 6.1 and FBS 98 in Sept 2016- + fam hx of DM- adheres with healthy lifestyle No sx of Diabetes- glucose 101 with fructosamine- normal 11/18\par  \par  4.GI- IBS- colitis 2012- presumed infectious- treated- resolved. CT abd- neg. EGD in 9/12- inflammation- EGD and colon 10/16- essentially neg- see reports. Nutritionist support in past\par  \par  5. Osteoporosis- on fosamax from 1/07 and improved DEXA 2009. Off fosamax since 4/10 because of GERD sx- Boniva started 8/10 with the intention to treat for at least 5 years total of bisphosphonates. ? whether the use of medication was in any way is contributing to GI sx and considered alternate treatments- i.e reclast.. Off Boniva in June 2012 with improved GI sx-\par  \par Essentially had 5 years of treatment- d/c'd in June 2012 after DEXA results- continued drug holiday and repeated DEXA in June 2014 - basically stable- Repeat DEXA 6/16- worsening T scores in fem neck with T -2.4- unable to resume bisphosphonate for GI issues- Saw Dr Sierra in consultation July 2017 and had DEXA repeated- advised continued drug holiday and repeat testing in one year. Seen again 7/18 with BMD indicating slight decrease in density in fem neck- advised continued drug holiday and f/u in one year- i.e. July 2019- has appt upcoming\par  \par  6. remote h/o melanoma 1991- and basal cell- sees derm Dr. Frost--last appt September 2018 goes every 6 mos-\par  \par  7. Hypertension with reactive component- Had normal 24 hour ambulatory BP monitor in remote past and repeated again in 9/13- only 4/35 daytime readings above 140 (11%)- and highest systolic . Given current guidelines and after discussion with pt opted for continued monitoring without antihypertensive meds.. In fall, 2012- neg urine ma and normal echo- no LVH. No clinical TOD. 24 hour ambu BP monitor repeated 10/15- overall average /67- 18% of readings above optimal for systolic- 163 systolic the highest.; lowest systolic 88 at 3:15 am. Pt not a dipper.\par \par  Chlorthalidone 12.5 (0.5 25 mg tab) started 7/15. Home monitoring all with systolics less than 140 BP- mostly in 120's systolic- Home cuff assessed as accurate in office. Continue current meds and monitoring ELevation today likely circumstantial- jut completed 5 days of prednison, coughing, using albuterol etc\par \par 6. Atypical Chest Pain/GERD- neg Cardiac CT in 2014 with clean coronaries- sx felt GI related. Minimal sx currently\par \par 7. Fall with head trauma but no sequela- 3/17- Fall prevention reviewed AT LENGTH- and recent fall 3/18- tripped on dog's leash-April 2018- missed step at home- landed on toes- FALL PREVENTION REVIEWED AT LENGTH\par \par 8. URI with cough- short course prednisone and now on albuterol- no alarm sx and duration 9 days Advised sx rx and to call in a few days to report on sx\par \par  f/u 6 mos or prn- labs then\par

## 2019-05-15 NOTE — HISTORY OF PRESENT ILLNESS
[FreeTextEntry1] : 72 yo for scheduled f/u for  GERD/ IBS, osteoporosis, reactive hypertension, recent URI and routine care [de-identified] : Last sen in Feb preop cataract surgery Subsequently seen in office for prop for second cataract surgery on other eye in March- Good outcome without complications\par \par Fine until 8-9 days ago had URI- developed a badcough- went to urgent care - see notes- took prednisone for 5 days- finished 2 days ago. Still with cough- worse at night- started albuterol yesterday (urgent care)- helps.\par \par No fever, chills, hemptysis, anorexia, diarrhea, rash or chills. No history of asthma. No CXR

## 2019-05-15 NOTE — HEALTH RISK ASSESSMENT
[Intercurrent Urgi Care visits] : went to urgent care [No falls in past year] : Patient reported no falls in the past year [0] : 2) Feeling down, depressed, or hopeless: Not at all (0) [de-identified] : see HPI [] : No [de-identified] : Healthy- no change [de-identified] : active- exercises [de-identified] : OPtho- Rheum-  [[id number]] : 0

## 2019-05-15 NOTE — PHYSICAL EXAM
[No Acute Distress] : no acute distress [Well Nourished] : well nourished [Well-Appearing] : well-appearing [Well Developed] : well developed [Normal Sclera/Conjunctiva] : normal sclera/conjunctiva [PERRL] : pupils equal round and reactive to light [EOMI] : extraocular movements intact [Normal Oropharynx] : the oropharynx was normal [Normal Outer Ear/Nose] : the outer ears and nose were normal in appearance [No JVD] : no jugular venous distention [Supple] : supple [No Lymphadenopathy] : no lymphadenopathy [Thyroid Normal, No Nodules] : the thyroid was normal and there were no nodules present [No Respiratory Distress] : no respiratory distress  [Clear to Auscultation] : lungs were clear to auscultation bilaterally [Normal Rate] : normal rate  [No Accessory Muscle Use] : no accessory muscle use [Regular Rhythm] : with a regular rhythm [No Murmur] : no murmur heard [Normal S1, S2] : normal S1 and S2 [No Carotid Bruits] : no carotid bruits [No Abdominal Bruit] : a ~M bruit was not heard ~T in the abdomen [No Varicosities] : no varicosities [Pedal Pulses Present] : the pedal pulses are present [No Edema] : there was no peripheral edema [No Extremity Clubbing/Cyanosis] : no extremity clubbing/cyanosis [No Palpable Aorta] : no palpable aorta [Soft] : abdomen soft [Non Tender] : non-tender [Non-distended] : non-distended [No Masses] : no abdominal mass palpated [No HSM] : no HSM [Normal Bowel Sounds] : normal bowel sounds [Normal Posterior Cervical Nodes] : no posterior cervical lymphadenopathy [Normal Anterior Cervical Nodes] : no anterior cervical lymphadenopathy [No CVA Tenderness] : no CVA  tenderness [No Spinal Tenderness] : no spinal tenderness [Grossly Normal Strength/Tone] : grossly normal strength/tone [No Joint Swelling] : no joint swelling [Normal Gait] : normal gait [No Rash] : no rash [Coordination Grossly Intact] : coordination grossly intact [No Focal Deficits] : no focal deficits [Deep Tendon Reflexes (DTR)] : deep tendon reflexes were 2+ and symmetric [Normal Affect] : the affect was normal [Normal Insight/Judgement] : insight and judgment were intact [Normal TMs] : both tympanic membranes were normal [Normal Nasal Mucosa] : the nasal mucosa was normal [de-identified] : Pharynx injected and pt slighty hoarse

## 2019-05-18 ENCOUNTER — TRANSCRIPTION ENCOUNTER (OUTPATIENT)
Age: 74
End: 2019-05-18

## 2019-06-18 ENCOUNTER — APPOINTMENT (OUTPATIENT)
Dept: OPHTHALMOLOGY | Facility: CLINIC | Age: 74
End: 2019-06-18
Payer: MEDICARE

## 2019-06-18 PROCEDURE — 92012 INTRM OPH EXAM EST PATIENT: CPT

## 2019-07-22 ENCOUNTER — APPOINTMENT (OUTPATIENT)
Dept: ENDOCRINOLOGY | Facility: CLINIC | Age: 74
End: 2019-07-22
Payer: MEDICARE

## 2019-07-22 VITALS
WEIGHT: 127 LBS | HEART RATE: 80 BPM | OXYGEN SATURATION: 99 % | BODY MASS INDEX: 21.42 KG/M2 | DIASTOLIC BLOOD PRESSURE: 56 MMHG | SYSTOLIC BLOOD PRESSURE: 110 MMHG | HEIGHT: 64.5 IN

## 2019-07-22 PROCEDURE — 99214 OFFICE O/P EST MOD 30 MIN: CPT | Mod: 25

## 2019-07-22 PROCEDURE — 77080 DXA BONE DENSITY AXIAL: CPT | Mod: GA

## 2019-07-22 PROCEDURE — ZZZZZ: CPT

## 2019-07-22 RX ORDER — ALBUTEROL SULFATE 90 UG/1
108 (90 BASE) INHALANT RESPIRATORY (INHALATION)
Refills: 0 | Status: DISCONTINUED | COMMUNITY
Start: 2019-05-15 | End: 2019-07-22

## 2019-07-23 ENCOUNTER — APPOINTMENT (OUTPATIENT)
Dept: OPHTHALMOLOGY | Facility: CLINIC | Age: 74
End: 2019-07-23
Payer: MEDICARE

## 2019-07-23 ENCOUNTER — NON-APPOINTMENT (OUTPATIENT)
Age: 74
End: 2019-07-23

## 2019-07-23 PROCEDURE — 92014 COMPRE OPH EXAM EST PT 1/>: CPT

## 2019-07-23 NOTE — END OF VISIT
[FreeTextEntry3] : I, Jeanette Montero, authored this note working as a medical scribe for Dr. Sierra.  07/22/2019. 11:00AM. This note was authored by the medical scribe for me. I have reviewed, edited, and revised the note as needed. I am in agreement with the exam findings, imaging findings, and treatment plan.  Russell Sierra MD

## 2019-07-23 NOTE — PHYSICAL EXAM
[Alert] : alert [No Acute Distress] : no acute distress [Well Nourished] : well nourished [Well Developed] : well developed [Normal Sclera/Conjunctiva] : normal sclera/conjunctiva [EOMI] : extra ocular movement intact [No Proptosis] : no proptosis [Normal Oropharynx] : the oropharynx was normal [Thyroid Not Enlarged] : the thyroid was not enlarged [No Thyroid Nodules] : there were no palpable thyroid nodules [No Respiratory Distress] : no respiratory distress [No Accessory Muscle Use] : no accessory muscle use [Clear to Auscultation] : lungs were clear to auscultation bilaterally [Normal Rate] : heart rate was normal  [Normal S1, S2] : normal S1 and S2 [Regular Rhythm] : with a regular rhythm [Normal Bowel Sounds] : normal bowel sounds [Not Tender] : non-tender [Post Cervical Nodes] : posterior cervical nodes [Soft] : abdomen soft [Not Distended] : not distended [Anterior Cervical Nodes] : anterior cervical nodes [Normal] : normal and non tender [No Spinal Tenderness] : no spinal tenderness [Spine Straight] : spine straight [No Stigmata of Cushings Syndrome] : no stigmata of cushings syndrome [Normal Gait] : normal gait [Normal Strength/Tone] : muscle strength and tone were normal [No Rash] : no rash [Normal Reflexes] : deep tendon reflexes were 2+ and symmetric [No Tremors] : no tremors [Oriented x3] : oriented to person, place, and time [Acanthosis Nigricans] : no acanthosis nigricans

## 2019-07-23 NOTE — ASSESSMENT
[Bisphosphonates] : The patient was instructed to take bisphosphonates on an empty stomach with a full glass of water,and wait at least 30 minutes before eating or lying down [Bisphosphonate Therapy] : Risks  and benefits of bisphosphonate therapy were  discussed with the patient including gastroesophageal irritation, osteonecrosis of the jaw, and atypical femur fractures, and acute phase reaction [FreeTextEntry1] : 74 year-old female postmenopausal osteoporosis for many years. \par \par The patient has been in a drug holiday since 2013. A bone mineral density test 2016 appeared to show decreasing hip  value. Repeat test 7/2017 shows osteopenia which was fairly stable. The spine and hip decreased versus the prior outside study but this remains fairly mild. Pt is concerned about restarting oral medications of active GERD. Continued drug holiday. BMD 2018 indicated stable osteopenia in all sites with slight decrease in bone density in the fem neck. No osteoporosis related fx, minor falls/slips in 2/2018 and 5/2018 (saw podiatrist). 7/2018 fell, no fx, just back pain. BMD 7/2019 indicates stability in total hip, fem neck, and proximal radius, all consistent with osteopenia. Slight decrease in spine, results consistent with osteoporosis. \par \par Prior cervical arthrodesis due to fall down the stairs 1989.\par \par Of note, pt had recent cataract surgery. C/o floaters and glares in vision. \par \par Pt has a very active lifestyle I discussed that weight bearing exercise, cardiovascular activities, and diet are beneficial to overall health, they are not adequate for bone density increase or alternative to osteoporosis medication. Recommended initiating medical therapy to stop bone loss, increase BMD, and thus reduce risk of future fx. Standard bone loss prevention medications were reviewed (bisphosphonates). She would benefit from bisphosphonate therapy with the expectation of a drug holiday within 5 years. Risks and benefits of bisphosphonate therapy were discussed to include decreased serum Ca, GI irritation, ONJ, atypical femur fx, and APR. Pt can consider IV Reclast or Prolia if she experiences UGI sx.\par \par All questions were answered. Pt understands and agrees to begin therapy with Actonel. \par \par f/u in 4 months with repeat BMD in 1 year.

## 2019-07-23 NOTE — PROCEDURE
[FreeTextEntry1] : Bone mineral density: 07/22/2019 \par Indication: vs 2018, prior test showed decreasing BMD\par Spine: -2.5, osteoporosis -5.9%\par Total hip: -1.7, osteopenia, no significant change \par Femoral neck: -2.4, osteopenia, no significant change \par Proximal radius: -1.6, osteopenia, no significant change \par \par Bone mineral density: 07/18/2018 \par Indication: vs 2017 assess response to medication \par Spine: -2.1 osteopenia, no significant change \par Total hip: -1.7 osteopenia, no significant change \par Femoral neck: -2.3 osteopenia (-4.3%)\par Proximal radius: -1.6 osteopenia, no significant change \par \par Bone mineral density July 18, 2017\par Indication prior test showed rapid bone loss\par Spine -2.0, osteopenia, prior outside test -1.5\par Total hip - 1.6, osteopenia, No prior report\par Femoral neck -2.1, osteopenia, prior test -2.4\par Proximal radius -1.5, osteopenia no prior

## 2019-07-23 NOTE — REVIEW OF SYSTEMS
[Dysuria] : dysuria [Joint Pain] : joint pain [Myalgia] : myalgia  [Back Pain] : back pain [Negative] : Heme/Lymph [FreeTextEntry5] : HTN [FreeTextEntry9] : hip pain  [FreeTextEntry8] : overactive bladder

## 2019-07-23 NOTE — HISTORY OF PRESENT ILLNESS
[Alendronate (Fosomax)] : Alendronate [None] : The patient is not currently taking any medication for this problem [Family History of Osteoporosis] : family history of osteoporosis [Family History of Breast Cancer] : family history of breast cancer [Family History of Hip Fracture] : family history of hip fracture [Regular Dental Follow-Up] : regular dental follow-up [Disordered Eating] : no past or present history of disordered eating [Taking Steroids] : no past or present history of taking steroids [Kidney Stones] : no history of kidney stones [Hyperparathyroidism] : no hyperparathyroidism [Previous Fragility Fracture] : no previous fragility fracture [FreeTextEntry1] : on alendronate for > 5 years, stopped 2013

## 2019-07-23 NOTE — PAST MEDICAL HISTORY
[Menopause Age____] : age at menopause was [unfilled] [History of Hormone Replacement Treatment] : has a history of hormone replacement treatment [de-identified] : HRT x 5 years

## 2019-10-14 ENCOUNTER — APPOINTMENT (OUTPATIENT)
Dept: GASTROENTEROLOGY | Facility: CLINIC | Age: 74
End: 2019-10-14
Payer: MEDICARE

## 2019-10-14 VITALS
WEIGHT: 130.13 LBS | BODY MASS INDEX: 21.68 KG/M2 | HEART RATE: 78 BPM | TEMPERATURE: 98.2 F | DIASTOLIC BLOOD PRESSURE: 78 MMHG | SYSTOLIC BLOOD PRESSURE: 136 MMHG | HEIGHT: 65 IN | OXYGEN SATURATION: 98 %

## 2019-10-14 PROCEDURE — 99214 OFFICE O/P EST MOD 30 MIN: CPT

## 2019-10-14 NOTE — PHYSICAL EXAM
[General Appearance - Alert] : alert [General Appearance - In No Acute Distress] : in no acute distress [Outer Ear] : the ears and nose were normal in appearance [Auscultation Breath Sounds / Voice Sounds] : lungs were clear to auscultation bilaterally [Heart Sounds] : normal S1 and S2 [Heart Rate And Rhythm] : heart rate was normal and rhythm regular [Heart Sounds Pericardial Friction Rub] : no pericardial rub [Edema] : there was no peripheral edema [Murmurs] : no murmurs [Heart Sounds Gallop] : no gallops [Bowel Sounds] : normal bowel sounds [Abdomen Soft] : soft [Abdomen Mass (___ Cm)] : no abdominal mass palpated [] : no hepato-splenomegaly [Abdomen Tenderness] : non-tender [No Focal Deficits] : no focal deficits [Skin Color & Pigmentation] : normal skin color and pigmentation

## 2019-10-14 NOTE — ASSESSMENT
[FreeTextEntry1] : This is a 74-year-old female with chronic GERD and irritable bowel syndrome. For the GERD, she can continue on omeprazole every 3-4 days. She will take famotidine 40 mg q.h.s. For the IBS, I recommend going back to [facility] for low FODMAP diet.

## 2019-10-14 NOTE — HISTORY OF PRESENT ILLNESS
[FreeTextEntry1] : Syeda presents for a followup visit. She tried stretching the omeprazole to be 4 days would develop heartburn. What works for her is omeprazole every 3 days along with an H2 blocker at night. She is currently on ranitidine 300 mg q.h.s. but she is hearing the news about ranitidine and carcinogen. I explained this to her. She will stop her ranitidine and switch over to famotidine 40 mg at bedtime. She denies dysphagia or odynophagia. She reports taking Xifaxan for IBS which helped her for 1-2 months But again symptoms with lower abdominal cramping, bloating and irregular bowel movements. She still has some dairy products. She also eats good amount of fruits and vegetables. I explained to her that this can have fructose as a sugar. She was seen Shyanne Smith RD in 2017. She denies rectal bleeding or melena. She denies weight loss.

## 2019-11-19 ENCOUNTER — APPOINTMENT (OUTPATIENT)
Dept: ENDOCRINOLOGY | Facility: CLINIC | Age: 74
End: 2019-11-19
Payer: MEDICARE

## 2019-11-19 VITALS
SYSTOLIC BLOOD PRESSURE: 140 MMHG | DIASTOLIC BLOOD PRESSURE: 64 MMHG | OXYGEN SATURATION: 98 % | HEIGHT: 65 IN | WEIGHT: 132 LBS | HEART RATE: 81 BPM | BODY MASS INDEX: 21.99 KG/M2

## 2019-11-19 PROBLEM — R05 DRY COUGH: Status: RESOLVED | Noted: 2019-05-15 | Resolved: 2019-11-19

## 2019-11-19 PROCEDURE — 99214 OFFICE O/P EST MOD 30 MIN: CPT

## 2019-11-19 RX ORDER — RISEDRONATE SODIUM 150 MG/1
150 TABLET, FILM COATED ORAL
Qty: 3 | Refills: 3 | Status: DISCONTINUED | COMMUNITY
Start: 2019-07-22 | End: 2019-11-19

## 2019-11-19 RX ORDER — RANITIDINE HYDROCHLORIDE 300 MG/1
300 CAPSULE ORAL
Qty: 90 | Refills: 3 | Status: DISCONTINUED | COMMUNITY
Start: 2017-05-02 | End: 2019-11-19

## 2019-11-20 ENCOUNTER — APPOINTMENT (OUTPATIENT)
Dept: INTERNAL MEDICINE | Facility: CLINIC | Age: 74
End: 2019-11-20
Payer: MEDICARE

## 2019-11-20 VITALS — DIASTOLIC BLOOD PRESSURE: 70 MMHG | SYSTOLIC BLOOD PRESSURE: 130 MMHG

## 2019-11-20 VITALS
WEIGHT: 132 LBS | HEIGHT: 64.5 IN | BODY MASS INDEX: 22.26 KG/M2 | DIASTOLIC BLOOD PRESSURE: 70 MMHG | SYSTOLIC BLOOD PRESSURE: 128 MMHG | HEART RATE: 76 BPM | OXYGEN SATURATION: 98 %

## 2019-11-20 DIAGNOSIS — M79.606 PAIN IN LEG, UNSPECIFIED: ICD-10-CM

## 2019-11-20 DIAGNOSIS — R05 COUGH: ICD-10-CM

## 2019-11-20 PROCEDURE — G0439: CPT

## 2019-11-20 PROCEDURE — G0444 DEPRESSION SCREEN ANNUAL: CPT | Mod: 59

## 2019-11-20 PROCEDURE — G0442 ANNUAL ALCOHOL SCREEN 15 MIN: CPT | Mod: 59

## 2019-11-20 NOTE — COUNSELING
[Fall prevention counseling provided] : Fall prevention counseling provided [Adequate lighting] : Adequate lighting [No throw rugs] : No throw rugs [Good understanding] : Patient has a good understanding of disease, goals and obesity follow-up plan [Use proper foot wear] : Use proper foot wear

## 2019-11-20 NOTE — HISTORY OF PRESENT ILLNESS
[None] : The patient is not currently taking any medication for this problem [Alendronate (Fosomax)] : Alendronate [Family History of Osteoporosis] : family history of osteoporosis [Family History of Breast Cancer] : family history of breast cancer [Family History of Hip Fracture] : family history of hip fracture [Regular Dental Follow-Up] : regular dental follow-up [Disordered Eating] : no past or present history of disordered eating [Kidney Stones] : no history of kidney stones [Hyperparathyroidism] : no hyperparathyroidism [Taking Steroids] : no past or present history of taking steroids [Previous Fragility Fracture] : no previous fragility fracture [FreeTextEntry1] : on alendronate for > 5 years, stopped 2013

## 2019-11-20 NOTE — END OF VISIT
[FreeTextEntry3] : I, John Pavon, authored this note working as a medical scribe for Dr. Sierra.  11/19/2019. 12:00PM. This note was authored by the medical scribe for me. I have reviewed, edited, and revised the note as needed. I am in agreement with the exam findings, imaging findings, and treatment plan.  Russell Sierra MD

## 2019-11-20 NOTE — ASSESSMENT
[Bisphosphonate Therapy] : Risks  and benefits of bisphosphonate therapy were  discussed with the patient including gastroesophageal irritation, osteonecrosis of the jaw, and atypical femur fractures, and acute phase reaction [Bisphosphonates] : The patient was instructed to take bisphosphonates on an empty stomach with a full glass of water,and wait at least 30 minutes before eating or lying down [FreeTextEntry1] : 74 year-old female postmenopausal osteoporosis for many years. \par \par Pt has been on drug holiday since 2013. BMD 2016 appeared to show decreasing hip value. Repeat test 7/2017 shows osteopenia which was fairly stable. The spine and hip decreased versus the prior outside study but this remains fairly mild. Pt was concerned about restarting oral medications of active GERD.\par \par BMD 2018 indicated stable osteopenia in all sites with slight decrease in bone density in the fem neck. No osteoporosis related fx, Minor falls/slips in 2/2018 and 5/2018 (saw podiatrist). 7/2018 fell, no fx, just back pain. BMD 7/2019 indicates stability in total hip, fem neck, and proximal radius, all c/w osteopenia. Slight decrease in spine, results c/w osteoporosis. Pt began Actonel 07/2019 but experienced UGI sx. Pt was recommended to try alternative medications including IV Reclast or Prolia. Risks and benefits discussed. All questions were answered. Pt understands and agrees to try IV Reclast if the insurance approves. \par \par Labs will be done tomorrow. Will call for results. \par \par F/u in 1 year with repeat BMD.

## 2019-11-20 NOTE — PHYSICAL EXAM
[Alert] : alert [No Acute Distress] : no acute distress [Well Nourished] : well nourished [Well Developed] : well developed [Normal Sclera/Conjunctiva] : normal sclera/conjunctiva [No Proptosis] : no proptosis [EOMI] : extra ocular movement intact [Normal Oropharynx] : the oropharynx was normal [No Thyroid Nodules] : there were no palpable thyroid nodules [Thyroid Not Enlarged] : the thyroid was not enlarged [Clear to Auscultation] : lungs were clear to auscultation bilaterally [No Accessory Muscle Use] : no accessory muscle use [No Respiratory Distress] : no respiratory distress [Normal S1, S2] : normal S1 and S2 [Normal Rate] : heart rate was normal  [Regular Rhythm] : with a regular rhythm [Normal Bowel Sounds] : normal bowel sounds [Not Tender] : non-tender [Soft] : abdomen soft [Not Distended] : not distended [Post Cervical Nodes] : posterior cervical nodes [Anterior Cervical Nodes] : anterior cervical nodes [Normal] : normal and non tender [No Spinal Tenderness] : no spinal tenderness [Spine Straight] : spine straight [No Stigmata of Cushings Syndrome] : no stigmata of cushings syndrome [Normal Gait] : normal gait [Normal Strength/Tone] : muscle strength and tone were normal [Normal Reflexes] : deep tendon reflexes were 2+ and symmetric [No Rash] : no rash [No Tremors] : no tremors [Oriented x3] : oriented to person, place, and time [Acanthosis Nigricans] : no acanthosis nigricans

## 2019-11-20 NOTE — REVIEW OF SYSTEMS
[Dysuria] : dysuria [Myalgia] : myalgia  [Joint Pain] : joint pain [Back Pain] : back pain [Negative] : Heme/Lymph [FreeTextEntry5] : HTN [FreeTextEntry8] : overactive bladder [FreeTextEntry9] : hip pain

## 2019-11-21 LAB
25(OH)D3 SERPL-MCNC: 45.3 NG/ML
ALBUMIN SERPL ELPH-MCNC: 4.9 G/DL
ALP BLD-CCNC: 34 U/L
ALT SERPL-CCNC: 23 U/L
ANION GAP SERPL CALC-SCNC: 13 MMOL/L
AST SERPL-CCNC: 25 U/L
BILIRUB SERPL-MCNC: 0.5 MG/DL
BUN SERPL-MCNC: 16 MG/DL
CALCIUM SERPL-MCNC: 9.8 MG/DL
CHLORIDE SERPL-SCNC: 98 MMOL/L
CO2 SERPL-SCNC: 27 MMOL/L
CREAT SERPL-MCNC: 0.76 MG/DL
GLUCOSE SERPL-MCNC: 94 MG/DL
POTASSIUM SERPL-SCNC: 4.4 MMOL/L
PROT SERPL-MCNC: 7.4 G/DL
SODIUM SERPL-SCNC: 138 MMOL/L

## 2019-11-22 LAB
CHOLEST SERPL-MCNC: 205 MG/DL
CHOLEST/HDLC SERPL: 3.7 RATIO
ESTIMATED AVERAGE GLUCOSE: 117 MG/DL
HBA1C MFR BLD HPLC: 5.7 %
HDLC SERPL-MCNC: 56 MG/DL
LDLC SERPL CALC-MCNC: 134 MG/DL
T4 FREE SERPL-MCNC: 1.4 NG/DL
TRIGL SERPL-MCNC: 77 MG/DL

## 2019-11-22 NOTE — PHYSICAL EXAM
[No Acute Distress] : no acute distress [Well Nourished] : well nourished [Well Developed] : well developed [Well-Appearing] : well-appearing [Normal Sclera/Conjunctiva] : normal sclera/conjunctiva [PERRL] : pupils equal round and reactive to light [EOMI] : extraocular movements intact [Normal Outer Ear/Nose] : the outer ears and nose were normal in appearance [Normal Oropharynx] : the oropharynx was normal [No JVD] : no jugular venous distention [No Lymphadenopathy] : no lymphadenopathy [Supple] : supple [Thyroid Normal, No Nodules] : the thyroid was normal and there were no nodules present [No Respiratory Distress] : no respiratory distress  [Clear to Auscultation] : lungs were clear to auscultation bilaterally [No Accessory Muscle Use] : no accessory muscle use [Normal Rate] : normal rate  [Regular Rhythm] : with a regular rhythm [Normal S1, S2] : normal S1 and S2 [No Carotid Bruits] : no carotid bruits [No Murmur] : no murmur heard [No Abdominal Bruit] : a ~M bruit was not heard ~T in the abdomen [Pedal Pulses Present] : the pedal pulses are present [No Edema] : there was no peripheral edema [No Palpable Aorta] : no palpable aorta [No Extremity Clubbing/Cyanosis] : no extremity clubbing/cyanosis [Soft] : abdomen soft [Non Tender] : non-tender [Non-distended] : non-distended [No HSM] : no HSM [Normal Bowel Sounds] : normal bowel sounds [Normal Posterior Cervical Nodes] : no posterior cervical lymphadenopathy [Normal Anterior Cervical Nodes] : no anterior cervical lymphadenopathy [No CVA Tenderness] : no CVA  tenderness [No Spinal Tenderness] : no spinal tenderness [No Joint Swelling] : no joint swelling [Grossly Normal Strength/Tone] : grossly normal strength/tone [No Rash] : no rash [No Focal Deficits] : no focal deficits [Coordination Grossly Intact] : coordination grossly intact [Normal Affect] : the affect was normal [Deep Tendon Reflexes (DTR)] : deep tendon reflexes were 2+ and symmetric [Normal Gait] : normal gait [Normal Insight/Judgement] : insight and judgment were intact [Normal Appearance] : normal in appearance [No Masses] : no palpable masses [No Nipple Discharge] : no nipple discharge [de-identified] : LE varicosities

## 2019-11-22 NOTE — ASSESSMENT
[FreeTextEntry1] : 1. HCM--immun- ok, Shingrix discussed- flu shot this season\par  last colon 10/16 and neg- f/u 10 yrs\par  mammo July 2019- neg\par  gyn- Jan 2018 Dr.Steven Babcock- issue of terminating screen\par  normal ecg in 2/19\par  Hep C screening neg 9/12\par  TSH 11/18 last- borderline high with normal free t3/t4- monitor expectantly- asx\par  Depression screening- negative\par  HIV screening offered and deferred\par \par  2. Hyperlipidemia- diet controlled- last - calculated CV Pooled Risk Cohort greater than 5-7.5. However had cardiac ct in June 2014 with clean coronaries- pt against adding a statin and as no cad and as over aged 65,hard to argue that needs medication\par \par  3. h/o IFG- HBA1 6.1 and FBS 98 in Sept 2016- + fam hx of DM- adheres with healthy lifestyle No sx of Diabetes- glucose 101 with fructosamine- normal 11/18\par  \par  4.GI- IBS- colitis 2012- presumed infectious- treated- resolved. CT abd- neg. EGD in 9/12- inflammation- EGD and colon 10/16- essentially neg- see reports. Nutritionist support in past\par  \par  5. Osteoporosis- on fosamax from 1/07 and improved DEXA 2009. Off fosamax since 4/10 because of GERD sx- Boniva started 8/10 with the intention to treat for at least 5 years total of bisphosphonates.. Off Boniva in June 2012 with improved GI sx\par  \par Essentially had 5 years of treatment- d/c'd in June 2012 after DEXA results- continued drug holiday and repeated DEXA in June 2014 - basically stable- Repeat DEXA 6/16- worsening T scores in fem neck with T -2.4- unable to resume bisphosphonate for GI issues- Saw Dr Sierra in consultation July 2017 and had DEXA repeated- advised continued drug holiday and repeat testing in one year. Seen again 7/18 with BMD indicating slight decrease in density in fem neck- advised continued drug holiday- Then Actonel restarted July 2019- see notes- f/u 11/19 an dc'd med due to GI side effects- will try for IV reclast- ins auth requested- awaiting notification- labs\par  \par  6. remote h/o melanoma 1991- and basal cell- sees derm Dr. Frost--last appt July 2019 goes every 6 mos-\par  \par  7. Hypertension with reactive component- Had normal 24 hour ambulatory BP monitor in remote past and repeated again in 9/13- only 4/35 daytime readings above 140 (11%)- and highest systolic . Given current guidelines and after discussion with pt opted for continued monitoring without antihypertensive meds.. In fall, 2012- neg urine ma and normal echo- no LVH. No clinical TOD. 24 hour ambu BP monitor repeated 10/15- overall average /67- 18% of readings above optimal for systolic- 163 systolic the highest.; lowest systolic 88 at 3:15 am. Pt not a dipper.\par \par  Chlorthalidone 12.5 (0.5 25 mg tab) started 7/15. Home monitoring all with systolics less than 140 BP- mostly in 120's systolic- Home cuff assessed as accurate in office. Continue current meds and monitoring\par \par 6. Fall with head trauma but no sequela- 3/17- Fall prevention reviewed AT LENGTH- and recent fall 3/18- tripped on dog's leash-April 2018- missed step at home- landed on toes- FALL PREVENTION REVIEWED AT LENGTH\par \par \par  f/u 6 mos or prn- labs miesha call- ecg done preop cataracts 2/19\par .

## 2019-11-22 NOTE — HISTORY OF PRESENT ILLNESS
[de-identified] : Last seen in May. Patient has been generally well without any significant intercurrent issues or problems. Adherent with medications as noted without side effects. Appetite good and weight reportedly stable. Active with good exercise tolerance. No sx of chest pain, sob, palpitations, orthopnea, PND, HECTOR, edema, lightheadedness..\par \par \par

## 2019-11-22 NOTE — HEALTH RISK ASSESSMENT
[Very Good] : ~his/her~  mood as very good [Yes] : Yes [Monthly or less (1 pt)] : Monthly or less (1 point) [1 or 2 (0 pts)] : 1 or 2 (0 points) [Never (0 pts)] : Never (0 points) [No falls in past year] : Patient reported no falls in the past year [No] : In the past 12 months have you used drugs other than those required for medical reasons? No [0] : 2) Feeling down, depressed, or hopeless: Not at all (0) [HIV test declined] : HIV test declined [Hepatitis C test declined] : Hepatitis C test declined [None] : None [Alone] : lives alone [Retired] : retired [] :  [College] : College [# Of Children ___] : has [unfilled] children [Feels Safe at Home] : Feels safe at home [Fully functional (bathing, dressing, toileting, transferring, walking, feeding)] : Fully functional (bathing, dressing, toileting, transferring, walking, feeding) [Fully functional (using the telephone, shopping, preparing meals, housekeeping, doing laundry, using] : Fully functional and needs no help or supervision to perform IADLs (using the telephone, shopping, preparing meals, housekeeping, doing laundry, using transportation, managing medications and managing finances) [Reports normal functional visual acuity (ie: able to read med bottle)] : Reports normal functional visual acuity [Seat Belt] :  uses seat belt [Designated Healthcare Proxy] : Designated healthcare proxy [Name: ___] : Health Care Proxy's Name: [unfilled]  [I will adhere to the patient's wishes as expressed in the advance directive except as noted below.] : I will adhere to the patient's wishes as expressed in the advance directive except as noted below [FreeTextEntry1] : GI issue- stopped actonel- see notes [] : No [de-identified] : No ED or Hospt [de-identified] : GI, Bone Endo [Audit-CScore] : 1 [de-identified] : Trainer- 45 minutes or weights/week and walking [de-identified] : Eats all foods- low fat- a lot of fruits and vegetables [[id number]] : 0 [Change in mental status noted] : No change in mental status noted [Language] : denies difficulty with language [Behavior] : denies difficulty with behavior [Learning/Retaining New Information] : denies difficulty learning/retaining new information [Handling Complex Tasks] : denies difficulty handling complex tasks [Reasoning] : denies difficulty with reasoning [Spatial Ability and Orientation] : denies difficulty with spatial ability and orientation [Reports changes in hearing] : Reports no changes in hearing [Reports changes in vision] : Reports no changes in vision [Reports changes in dental health] : Reports no changes in dental health [Smoke Detector] : no smoke detector [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

## 2019-12-16 ENCOUNTER — TRANSCRIPTION ENCOUNTER (OUTPATIENT)
Age: 74
End: 2019-12-16

## 2020-02-05 ENCOUNTER — APPOINTMENT (OUTPATIENT)
Dept: GASTROENTEROLOGY | Facility: CLINIC | Age: 75
End: 2020-02-05
Payer: MEDICARE

## 2020-02-05 VITALS
BODY MASS INDEX: 21.93 KG/M2 | WEIGHT: 130 LBS | HEART RATE: 80 BPM | TEMPERATURE: 97.8 F | SYSTOLIC BLOOD PRESSURE: 130 MMHG | HEIGHT: 64.5 IN | OXYGEN SATURATION: 98 % | DIASTOLIC BLOOD PRESSURE: 60 MMHG

## 2020-02-05 PROCEDURE — 99213 OFFICE O/P EST LOW 20 MIN: CPT

## 2020-02-05 NOTE — HISTORY OF PRESENT ILLNESS
[FreeTextEntry1] : Syeda Presents for a followup visit. She tried tapering down her omeprazole but developed worsening heartburn symptoms. She states that she is currently taking omeprazole Monday, Wednesday, Friday along with famotidine 40 mg q.h.s. and this appears to be better for her. She still has some breakthrough heartburn symptoms. She denies any dysphagia or odynophagia. She relates that in December she developed severe abdominal cramping. She has irregular bowel movements alternating between diarrhea and constipation. Her symptoms usually occur after breakfast. I again reviewed with the results of upper endoscopy and colonoscopy from 2016.

## 2020-02-05 NOTE — PHYSICAL EXAM
[General Appearance - Alert] : alert [General Appearance - In No Acute Distress] : in no acute distress [Outer Ear] : the ears and nose were normal in appearance [Sclera] : the sclera and conjunctiva were normal [Heart Rate And Rhythm] : heart rate was normal and rhythm regular [Auscultation Breath Sounds / Voice Sounds] : lungs were clear to auscultation bilaterally [Heart Sounds] : normal S1 and S2 [Heart Sounds Gallop] : no gallops [Murmurs] : no murmurs [Heart Sounds Pericardial Friction Rub] : no pericardial rub [Edema] : there was no peripheral edema [Abdomen Soft] : soft [Bowel Sounds] : normal bowel sounds [] : no hepato-splenomegaly [Abdomen Mass (___ Cm)] : no abdominal mass palpated [Abdomen Tenderness] : non-tender [Skin Color & Pigmentation] : normal skin color and pigmentation [No Focal Deficits] : no focal deficits

## 2020-02-27 ENCOUNTER — APPOINTMENT (OUTPATIENT)
Dept: RHEUMATOLOGY | Facility: CLINIC | Age: 75
End: 2020-02-27
Payer: MEDICARE

## 2020-02-27 PROCEDURE — 96374 THER/PROPH/DIAG INJ IV PUSH: CPT

## 2020-03-12 ENCOUNTER — APPOINTMENT (OUTPATIENT)
Dept: OPHTHALMOLOGY | Facility: CLINIC | Age: 75
End: 2020-03-12
Payer: MEDICARE

## 2020-03-12 ENCOUNTER — NON-APPOINTMENT (OUTPATIENT)
Age: 75
End: 2020-03-12

## 2020-03-12 PROCEDURE — 92014 COMPRE OPH EXAM EST PT 1/>: CPT

## 2020-04-23 ENCOUNTER — APPOINTMENT (OUTPATIENT)
Dept: OPHTHALMOLOGY | Facility: CLINIC | Age: 75
End: 2020-04-23

## 2020-04-26 ENCOUNTER — RX RENEWAL (OUTPATIENT)
Age: 75
End: 2020-04-26

## 2020-05-20 ENCOUNTER — APPOINTMENT (OUTPATIENT)
Dept: INTERNAL MEDICINE | Facility: CLINIC | Age: 75
End: 2020-05-20

## 2020-05-26 ENCOUNTER — APPOINTMENT (OUTPATIENT)
Dept: OPHTHALMOLOGY | Facility: CLINIC | Age: 75
End: 2020-05-26
Payer: MEDICARE

## 2020-05-26 ENCOUNTER — NON-APPOINTMENT (OUTPATIENT)
Age: 75
End: 2020-05-26

## 2020-05-26 PROCEDURE — 66821 AFTER CATARACT LASER SURGERY: CPT | Mod: LT

## 2020-06-09 ENCOUNTER — APPOINTMENT (OUTPATIENT)
Dept: OPHTHALMOLOGY | Facility: CLINIC | Age: 75
End: 2020-06-09
Payer: MEDICARE

## 2020-06-09 ENCOUNTER — NON-APPOINTMENT (OUTPATIENT)
Age: 75
End: 2020-06-09

## 2020-06-09 PROCEDURE — 99024 POSTOP FOLLOW-UP VISIT: CPT

## 2020-07-06 ENCOUNTER — RX RENEWAL (OUTPATIENT)
Age: 75
End: 2020-07-06

## 2020-08-09 ENCOUNTER — RX RENEWAL (OUTPATIENT)
Age: 75
End: 2020-08-09

## 2020-08-28 ENCOUNTER — APPOINTMENT (OUTPATIENT)
Dept: ENDOCRINOLOGY | Facility: CLINIC | Age: 75
End: 2020-08-28
Payer: MEDICARE

## 2020-08-28 VITALS — OXYGEN SATURATION: 98 % | DIASTOLIC BLOOD PRESSURE: 80 MMHG | SYSTOLIC BLOOD PRESSURE: 120 MMHG | HEART RATE: 82 BPM

## 2020-08-28 VITALS — WEIGHT: 130 LBS | BODY MASS INDEX: 22.2 KG/M2 | HEIGHT: 64.3 IN | TEMPERATURE: 97.3 F

## 2020-08-28 PROCEDURE — 99213 OFFICE O/P EST LOW 20 MIN: CPT | Mod: 25

## 2020-08-28 PROCEDURE — ZZZZZ: CPT

## 2020-08-28 PROCEDURE — 77080 DXA BONE DENSITY AXIAL: CPT | Mod: GA

## 2020-08-28 RX ORDER — FAMOTIDINE 40 MG/1
40 TABLET, FILM COATED ORAL DAILY
Qty: 90 | Refills: 3 | Status: DISCONTINUED | COMMUNITY
Start: 2019-10-14 | End: 2020-08-28

## 2020-08-28 NOTE — PROCEDURE
[FreeTextEntry1] : Bone mineral density 8/28/20\par indication: vs 2019 prior study showed rapid bone loss assess response to medication \par spine - 2.3 osteopenia +3.2%\par total hip -1.9 osteopenia no significant change\par femoral neck -2.5 osteoporosis no significant change although decreased versus 2017\par proximal radius -1.3 osteopenia no significant change\par \par Bone mineral density: 07/22/2019 \par Indication: vs 2018, prior test showed decreasing BMD\par Spine: -2.5, osteoporosis -5.9%\par Total hip: -1.7, osteopenia, no significant change \par Femoral neck: -2.4, osteopenia, no significant change \par Proximal radius: -1.6, osteopenia, no significant change \par \par Bone mineral density: 07/18/2018 \par Indication: vs 2017 assess response to medication \par Spine: -2.1 osteopenia, no significant change \par Total hip: -1.7 osteopenia, no significant change \par Femoral neck: -2.3 osteopenia (-4.3%)\par Proximal radius: -1.6 osteopenia, no significant change \par \par Bone mineral density July 18, 2017\par Indication prior test showed rapid bone loss\par Spine -2.0, osteopenia, prior outside test -1.5\par Total hip - 1.6, osteopenia, No prior report\par Femoral neck -2.1, osteopenia, prior test -2.4\par Proximal radius -1.5, osteopenia no prior

## 2020-08-28 NOTE — PHYSICAL EXAM
[Alert] : alert [Well Nourished] : well nourished [Normal Sclera/Conjunctiva] : normal sclera/conjunctiva [Well Developed] : well developed [No Acute Distress] : no acute distress [Normal Oropharynx] : the oropharynx was normal [No Proptosis] : no proptosis [EOMI] : extra ocular movement intact [Thyroid Not Enlarged] : the thyroid was not enlarged [No Thyroid Nodules] : no palpable thyroid nodules [No Respiratory Distress] : no respiratory distress [No Accessory Muscle Use] : no accessory muscle use [Clear to Auscultation] : lungs were clear to auscultation bilaterally [Normal S1, S2] : normal S1 and S2 [Regular Rhythm] : with a regular rhythm [Normal Rate] : heart rate was normal [Not Tender] : non-tender [No Edema] : no peripheral edema [Normal Bowel Sounds] : normal bowel sounds [Not Distended] : not distended [Soft] : abdomen soft [Normal Anterior Cervical Nodes] : no anterior cervical lymphadenopathy [Normal Posterior Cervical Nodes] : no posterior cervical lymphadenopathy [Spine Straight] : spine straight [No Spinal Tenderness] : no spinal tenderness [No Stigmata of Cushings Syndrome] : no stigmata of Cushings Syndrome [Normal Gait] : normal gait [Normal Strength/Tone] : muscle strength and tone were normal [Normal Reflexes] : deep tendon reflexes were 2+ and symmetric [Acanthosis Nigricans] : no acanthosis nigricans [No Rash] : no rash [No Tremors] : no tremors [Oriented x3] : oriented to person, place, and time

## 2020-08-28 NOTE — PAST MEDICAL HISTORY
[Menopause Age____] : age at menopause was [unfilled] [History of Hormone Replacement Treatment] : has a history of hormone replacement treatment [de-identified] : HRT x 5 years

## 2020-08-28 NOTE — HISTORY OF PRESENT ILLNESS
[Alendronate (Fosomax)] : Alendronate [None] : The patient is not currently taking any medication for this problem [Family History of Osteoporosis] : family history of osteoporosis [Family History of Breast Cancer] : family history of breast cancer [Family History of Hip Fracture] : family history of hip fracture [Regular Dental Follow-Up] : regular dental follow-up [Taking Steroids] : no past or present history of taking steroids [Disordered Eating] : no past or present history of disordered eating [Kidney Stones] : no history of kidney stones [Previous Fragility Fracture] : no previous fragility fracture [Hyperparathyroidism] : no hyperparathyroidism [FreeTextEntry1] : on alendronate for > 5 years, stopped 2013

## 2020-08-28 NOTE — ASSESSMENT
[Bisphosphonate Therapy] : Risks  and benefits of bisphosphonate therapy were  discussed with the patient including gastroesophageal irritation, osteonecrosis of the jaw, and atypical femur fractures, and acute phase reaction [Bisphosphonates] : The patient was instructed to take bisphosphonates on an empty stomach with a full glass of water,and wait at least 30 minutes before eating or lying down [FreeTextEntry1] : 75 year-old female postmenopausal osteoporosis for many years. \par \par Pt has been on drug holiday since 2013. BMD 2016 appeared to show decreasing hip value. Repeat test 7/2017 shows osteopenia which was fairly stable. The spine and hip decreased versus the prior outside study but this remains fairly mild. Pt was concerned about restarting oral medications of active GERD.\par \par BMD 2018 indicated stable osteopenia in all sites with slight decrease in bone density in the fem neck. No osteoporosis related fx, Minor falls/slips in 2/2018 and 5/2018 (saw podiatrist). 7/2018 fell, no fx, just back pain. BMD 7/2019 indicates stability in total hip, fem neck, and proximal radius, all c/w osteopenia. Slight decrease in spine, results c/w osteoporosis. Pt began Actonel 07/2019 but experienced UGI sx. \par Changed to Reclast first dose 2/2020 tolerated well\par Repeat BMD increased spine, no significant change hip, although decreased vs 2017\par Alonso 2018 [facility] data reviewed\par F/u in 1 year with repeat BMD.

## 2020-11-19 ENCOUNTER — NON-APPOINTMENT (OUTPATIENT)
Age: 75
End: 2020-11-19

## 2020-11-20 ENCOUNTER — APPOINTMENT (OUTPATIENT)
Dept: INTERNAL MEDICINE | Facility: CLINIC | Age: 75
End: 2020-11-20
Payer: MEDICARE

## 2020-11-20 VITALS
SYSTOLIC BLOOD PRESSURE: 160 MMHG | WEIGHT: 132 LBS | HEIGHT: 64.5 IN | DIASTOLIC BLOOD PRESSURE: 64 MMHG | HEART RATE: 84 BPM | OXYGEN SATURATION: 98 % | BODY MASS INDEX: 22.26 KG/M2

## 2020-11-20 VITALS — SYSTOLIC BLOOD PRESSURE: 132 MMHG | RESPIRATION RATE: 14 BRPM | HEART RATE: 80 BPM | DIASTOLIC BLOOD PRESSURE: 68 MMHG

## 2020-11-20 PROCEDURE — G0439: CPT

## 2020-11-20 PROCEDURE — G0444 DEPRESSION SCREEN ANNUAL: CPT | Mod: 59

## 2020-11-20 RX ORDER — DICYCLOMINE HYDROCHLORIDE 10 MG/1
10 CAPSULE ORAL EVERY 6 HOURS
Qty: 120 | Refills: 1 | Status: DISCONTINUED | COMMUNITY
Start: 2020-02-05 | End: 2020-11-20

## 2020-11-20 RX ORDER — TOLTERODINE TARTRATE 4 MG/1
4 CAPSULE, EXTENDED RELEASE ORAL
Refills: 0 | Status: COMPLETED | COMMUNITY
End: 2020-11-20

## 2020-11-21 LAB
ALBUMIN SERPL ELPH-MCNC: 4.9 G/DL
ALP BLD-CCNC: 30 U/L
ALT SERPL-CCNC: 22 U/L
ANION GAP SERPL CALC-SCNC: 12 MMOL/L
AST SERPL-CCNC: 27 U/L
BASOPHILS # BLD AUTO: 0.06 K/UL
BASOPHILS NFR BLD AUTO: 0.9 %
BILIRUB SERPL-MCNC: 0.4 MG/DL
BUN SERPL-MCNC: 15 MG/DL
CALCIUM SERPL-MCNC: 9.6 MG/DL
CHLORIDE SERPL-SCNC: 99 MMOL/L
CO2 SERPL-SCNC: 28 MMOL/L
CREAT SERPL-MCNC: 0.75 MG/DL
EOSINOPHIL # BLD AUTO: 0.13 K/UL
EOSINOPHIL NFR BLD AUTO: 1.8 %
GLUCOSE SERPL-MCNC: 91 MG/DL
HCT VFR BLD CALC: 40 %
HGB BLD-MCNC: 12.9 G/DL
IMM GRANULOCYTES NFR BLD AUTO: 0.3 %
LYMPHOCYTES # BLD AUTO: 1.57 K/UL
LYMPHOCYTES NFR BLD AUTO: 22.3 %
MAN DIFF?: NORMAL
MCHC RBC-ENTMCNC: 31.5 PG
MCHC RBC-ENTMCNC: 32.3 GM/DL
MCV RBC AUTO: 97.8 FL
MONOCYTES # BLD AUTO: 0.67 K/UL
MONOCYTES NFR BLD AUTO: 9.5 %
NEUTROPHILS # BLD AUTO: 4.59 K/UL
NEUTROPHILS NFR BLD AUTO: 65.2 %
PLATELET # BLD AUTO: 194 K/UL
POTASSIUM SERPL-SCNC: 3.7 MMOL/L
PROT SERPL-MCNC: 7 G/DL
RBC # BLD: 4.09 M/UL
RBC # FLD: 12.8 %
SODIUM SERPL-SCNC: 139 MMOL/L
WBC # FLD AUTO: 7.04 K/UL

## 2020-11-23 LAB
CHOLEST SERPL-MCNC: 213 MG/DL
HDLC SERPL-MCNC: 52 MG/DL
LDLC SERPL CALC-MCNC: 139 MG/DL
NONHDLC SERPL-MCNC: 161 MG/DL
T3FREE SERPL-MCNC: 2.84 PG/ML
T4 FREE SERPL-MCNC: 1.3 NG/DL
TRIGL SERPL-MCNC: 109 MG/DL
TSH SERPL-ACNC: 5.29 UIU/ML

## 2020-11-23 NOTE — HISTORY OF PRESENT ILLNESS
[de-identified] : Last seen one year ago. Patient has been generally well without any significant intercurrent issues or problems. Adherent with medications as noted without side effects. Appetite good and weight reportedly stable. Active with good exercise tolerance. No sx of chest pain, sob, palpitations, orthopnea, PND, HECTOR, edema, lightheadedness..\par \par Has been safe during Covid- spends time with family and two young grandchildren\par

## 2020-11-23 NOTE — ASSESSMENT
[FreeTextEntry1] : 1. HCM--immun- ok,- flu shot this season\par  last colon 10/16 and neg- f/u 10 yrs\par  mammo July 2020- neg\par  gyn- July 2020  Dr.Steven Babcock- issue of terminating screen\par  normal ecg in 2/19\par  Hep C screening neg 9/12\par  TSH 11/18 last- borderline high with normal free t3/t4- monitor expectantly- asx\par  Depression screening- negative\par  HIV screening offered and deferred\par \par  2. Hyperlipidemia- diet controlled- last - calculated CV Pooled Risk Cohort greater than 5-7.5. However had cardiac ct in June 2014 with clean coronaries- pt against adding a statin and as no cad and as over aged 65,hard to argue that needs medication\par \par  3. h/o IFG- HBA1 6.1 and FBS 98 in Sept 2016- + fam hx of DM- adheres with healthy lifestyle No sx of Diabetes- glucose 101 with fructosamine- normal 11/18\par  \par  4.GI- IBS- colitis 2012- presumed infectious- treated- resolved. CT abd- neg. EGD in 9/12- inflammation- EGD and colon 10/16- essentially neg- see reports. Nutritionist support in past\par  \par  5. Osteoporosis- on fosamax from 1/07 and improved DEXA 2009. Off fosamax since 4/10 because of GERD sx- Boniva started 8/10 with the intention to treat for at least 5 years total of bisphosphonates.. Off Boniva in June 2012 with improved GI sx\par  \par Essentially had 5 years of treatment- d/c'd in June 2012 after DEXA results- continued drug holiday and repeated DEXA in June 2014 - basically stable- Repeat DEXA 6/16- worsening T scores in fem neck with T -2.4- unable to resume bisphosphonate for GI issues- Saw Dr Sierra in consultation July 2017 and had DEXA repeated- advised continued drug holiday and repeat testing in one year. Seen again 7/18 with BMD indicating slight decrease in density in fem neck- advised continued drug holiday- Then Actonel restarted July 2019- see notes- f/u 11/19 an dc'd med due to GI side effects- had one dose IV reclast 11/19- recent visit- reassess next summer\par  \par  6. remote h/o melanoma 1991- and basal cell- sees derm Dr. Frost--last appt July 2020 goes every 6 mos-\par  \par  7. Hypertension with reactive component- Had normal 24 hour ambulatory BP monitor in remote past and repeated again in 9/13- only 4/35 daytime readings above 140 (11%)- and highest systolic . Given current guidelines and after discussion with pt opted for continued monitoring without antihypertensive meds.. In fall, 2012- neg urine ma and normal echo- no LVH. No clinical TOD. 24 hour ambu BP monitor repeated 10/15- overall average /67- 18% of readings above optimal for systolic- 163 systolic the highest.; lowest systolic 88 at 3:15 am. Pt not a dipper.\par \par  Chlorthalidone 12.5 (0.5 25 mg tab) started 7/15. Home monitoring all with systolics less than 140 BP- mostly in 120's systolic- Home cuff assessed as accurate in office. Continue current meds and monitoring- reports home BP's all normal and intercurrent MD visits\par \par 6. Fall with head trauma but no sequela- 3/17- Fall prevention reviewed AT LENGTH- and  fall 3/18- tripped on dog's leash-April 2018- missed step at home- landed on toes- now 10/20 tripped on toy on the floor-  FALL PREVENTION REVIEWED AT LENGTH\par \par \par  f/u 6 mos or prn-\par

## 2020-11-23 NOTE — HEALTH RISK ASSESSMENT
[Very Good] : ~his/her~  mood as very good [Yes] : Yes [1 or 2 (0 pts)] : 1 or 2 (0 points) [Never (0 pts)] : Never (0 points) [No] : In the past 12 months have you used drugs other than those required for medical reasons? No [0] : 2) Feeling down, depressed, or hopeless: Not at all (0) [HIV test declined] : HIV test declined [Hepatitis C test declined] : Hepatitis C test declined [None] : None [Alone] : lives alone [Retired] : retired [College] : College [] :  [# Of Children ___] : has [unfilled] children [Feels Safe at Home] : Feels safe at home [Fully functional (bathing, dressing, toileting, transferring, walking, feeding)] : Fully functional (bathing, dressing, toileting, transferring, walking, feeding) [Fully functional (using the telephone, shopping, preparing meals, housekeeping, doing laundry, using] : Fully functional and needs no help or supervision to perform IADLs (using the telephone, shopping, preparing meals, housekeeping, doing laundry, using transportation, managing medications and managing finances) [Reports normal functional visual acuity (ie: able to read med bottle)] : Reports normal functional visual acuity [Seat Belt] :  uses seat belt [Designated Healthcare Proxy] : Designated healthcare proxy [Name: ___] : Health Care Proxy's Name: [unfilled]  [I will adhere to the patient's wishes as expressed in the advance directive except as noted below.] : I will adhere to the patient's wishes as expressed in the advance directive except as noted below [4 or more  times a week (4 pts)] : 4 or more  times a week (4 points) [One fall no injury in past year] : Patient reported one fall in the past year without injury [] : No [de-identified] : No ED or Hospital [de-identified] :  Bone Endo; GI. Derm, Optho [Audit-CScore] : 4 [de-identified] :  walking and watching grandchildren [de-identified] : Eats all foods- low fat- a lot of fruits and vegetables [de-identified] : 10/20- tripped over something left on floor by grandchild- no significant injury [[id number]] : 0 [Change in mental status noted] : No change in mental status noted [Language] : denies difficulty with language [Behavior] : denies difficulty with behavior [Learning/Retaining New Information] : denies difficulty learning/retaining new information [Handling Complex Tasks] : denies difficulty handling complex tasks [Reasoning] : denies difficulty with reasoning [Spatial Ability and Orientation] : denies difficulty with spatial ability and orientation [Sexually Active] : not sexually active [Reports changes in hearing] : Reports no changes in hearing [Reports changes in vision] : Reports no changes in vision [Reports changes in dental health] : Reports no changes in dental health [Smoke Detector] : no smoke detector [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

## 2020-11-23 NOTE — PHYSICAL EXAM
[No Acute Distress] : no acute distress [Well Nourished] : well nourished [Well Developed] : well developed [Well-Appearing] : well-appearing [Normal Sclera/Conjunctiva] : normal sclera/conjunctiva [PERRL] : pupils equal round and reactive to light [EOMI] : extraocular movements intact [Normal Outer Ear/Nose] : the outer ears and nose were normal in appearance [Normal Oropharynx] : the oropharynx was normal [No JVD] : no jugular venous distention [No Lymphadenopathy] : no lymphadenopathy [Supple] : supple [Thyroid Normal, No Nodules] : the thyroid was normal and there were no nodules present [No Respiratory Distress] : no respiratory distress  [No Accessory Muscle Use] : no accessory muscle use [Clear to Auscultation] : lungs were clear to auscultation bilaterally [Normal Rate] : normal rate  [Regular Rhythm] : with a regular rhythm [Normal S1, S2] : normal S1 and S2 [No Murmur] : no murmur heard [No Carotid Bruits] : no carotid bruits [No Abdominal Bruit] : a ~M bruit was not heard ~T in the abdomen [Pedal Pulses Present] : the pedal pulses are present [No Edema] : there was no peripheral edema [No Palpable Aorta] : no palpable aorta [No Extremity Clubbing/Cyanosis] : no extremity clubbing/cyanosis [Normal Appearance] : normal in appearance [No Nipple Discharge] : no nipple discharge [Soft] : abdomen soft [Non Tender] : non-tender [Non-distended] : non-distended [No Masses] : no abdominal mass palpated [No HSM] : no HSM [Normal Bowel Sounds] : normal bowel sounds [Normal Posterior Cervical Nodes] : no posterior cervical lymphadenopathy [Normal Anterior Cervical Nodes] : no anterior cervical lymphadenopathy [No CVA Tenderness] : no CVA  tenderness [No Spinal Tenderness] : no spinal tenderness [No Joint Swelling] : no joint swelling [Grossly Normal Strength/Tone] : grossly normal strength/tone [No Rash] : no rash [Coordination Grossly Intact] : coordination grossly intact [No Focal Deficits] : no focal deficits [Normal Gait] : normal gait [Deep Tendon Reflexes (DTR)] : deep tendon reflexes were 2+ and symmetric [Normal Affect] : the affect was normal [Normal Insight/Judgement] : insight and judgment were intact [de-identified] : LE varicosities

## 2020-12-07 ENCOUNTER — APPOINTMENT (OUTPATIENT)
Dept: OPHTHALMOLOGY | Facility: CLINIC | Age: 75
End: 2020-12-07
Payer: MEDICARE

## 2020-12-07 ENCOUNTER — NON-APPOINTMENT (OUTPATIENT)
Age: 75
End: 2020-12-07

## 2020-12-07 PROCEDURE — 92014 COMPRE OPH EXAM EST PT 1/>: CPT

## 2021-01-25 ENCOUNTER — NON-APPOINTMENT (OUTPATIENT)
Age: 76
End: 2021-01-25

## 2021-01-28 ENCOUNTER — APPOINTMENT (OUTPATIENT)
Dept: OPHTHALMOLOGY | Facility: CLINIC | Age: 76
End: 2021-01-28

## 2021-01-28 ENCOUNTER — NON-APPOINTMENT (OUTPATIENT)
Age: 76
End: 2021-01-28

## 2021-01-28 ENCOUNTER — APPOINTMENT (OUTPATIENT)
Dept: OPHTHALMOLOGY | Facility: CLINIC | Age: 76
End: 2021-01-28
Payer: MEDICARE

## 2021-01-28 PROCEDURE — 66821 AFTER CATARACT LASER SURGERY: CPT | Mod: RT

## 2021-01-31 ENCOUNTER — RX RENEWAL (OUTPATIENT)
Age: 76
End: 2021-01-31

## 2021-02-08 ENCOUNTER — APPOINTMENT (OUTPATIENT)
Dept: GASTROENTEROLOGY | Facility: CLINIC | Age: 76
End: 2021-02-08
Payer: MEDICARE

## 2021-02-08 VITALS
OXYGEN SATURATION: 98 % | BODY MASS INDEX: 21.59 KG/M2 | TEMPERATURE: 97.6 F | SYSTOLIC BLOOD PRESSURE: 140 MMHG | WEIGHT: 128 LBS | DIASTOLIC BLOOD PRESSURE: 75 MMHG | HEART RATE: 98 BPM | HEIGHT: 64.5 IN

## 2021-02-08 PROCEDURE — 99212 OFFICE O/P EST SF 10 MIN: CPT

## 2021-02-08 NOTE — HISTORY OF PRESENT ILLNESS
[FreeTextEntry1] : Syeda presents for follow-up visit.  She continues to take omeprazole 20 mg 3 times a week along with famotidine 20 mg twice a day.  She tried stretching the dose of omeprazole to twice a week but developed heartburn symptoms.  She denies dysphagia or odynophagia.  Blood last time she had an upper endoscopy was approximately 5 years ago with esophagitis and gastric polyps.  She relates that in January she had a flare of her IBS and that she developed abdominal pain, cramping and irregular bowel movements.  On average she has bowel movements once every 3 days initially with difficulty going to the bathroom followed by loose stools.  No rectal bleeding or melena.  Colonoscopy from 5 years ago was unremarkable.  No family history of colon cancer or colon polyps.

## 2021-02-08 NOTE — ASSESSMENT
[FreeTextEntry1] : This is a 75-year-old female with chronic GERD and difficulty coming off omeprazole.  She currently is taking omeprazole 20 mg 3 times a week along with famotidine 20 mg twice daily.  She develops heartburn symptoms when she attempts to cut down on the omeprazole.  Upper endoscopy from 5 years ago with reflux esophagitis and gastric polyps.  I recommend repeating an upper endoscopy.  I explained to her the risks, alternatives and benefits to an endoscopy.  She has intermittent IBS symptoms for which she tries to watch her diet.  It seems that she is mostly constipated and when she has a bowel movement she would have loose stools.  I recommend adding stool softeners.  She reports being on a high-fiber diet.  She reports drinking plenty of water.  She has been able to walk outside as much as she used to.  She does however watch her grandchildren and does not sit still.  She tried MiraLAX in the past which made her feel more bloated.  In the past she has taken Xifaxan with good response for several weeks.  She wants to hold off on Xifaxan.

## 2021-02-11 ENCOUNTER — APPOINTMENT (OUTPATIENT)
Dept: OPHTHALMOLOGY | Facility: CLINIC | Age: 76
End: 2021-02-11
Payer: MEDICARE

## 2021-02-11 ENCOUNTER — NON-APPOINTMENT (OUTPATIENT)
Age: 76
End: 2021-02-11

## 2021-02-11 PROCEDURE — 99024 POSTOP FOLLOW-UP VISIT: CPT

## 2021-04-27 ENCOUNTER — RX RENEWAL (OUTPATIENT)
Age: 76
End: 2021-04-27

## 2021-05-27 ENCOUNTER — APPOINTMENT (OUTPATIENT)
Dept: DISASTER EMERGENCY | Facility: CLINIC | Age: 76
End: 2021-05-27

## 2021-06-01 ENCOUNTER — APPOINTMENT (OUTPATIENT)
Dept: GASTROENTEROLOGY | Facility: AMBULATORY MEDICAL SERVICES | Age: 76
End: 2021-06-01
Payer: MEDICARE

## 2021-06-01 PROCEDURE — 43239 EGD BIOPSY SINGLE/MULTIPLE: CPT

## 2021-06-08 ENCOUNTER — NON-APPOINTMENT (OUTPATIENT)
Age: 76
End: 2021-06-08

## 2021-07-14 ENCOUNTER — NON-APPOINTMENT (OUTPATIENT)
Age: 76
End: 2021-07-14

## 2021-07-14 ENCOUNTER — APPOINTMENT (OUTPATIENT)
Dept: OPHTHALMOLOGY | Facility: CLINIC | Age: 76
End: 2021-07-14
Payer: MEDICARE

## 2021-07-14 PROCEDURE — 92012 INTRM OPH EXAM EST PATIENT: CPT

## 2021-08-04 ENCOUNTER — APPOINTMENT (OUTPATIENT)
Dept: ENDOCRINOLOGY | Facility: CLINIC | Age: 76
End: 2021-08-04
Payer: MEDICARE

## 2021-08-04 ENCOUNTER — LABORATORY RESULT (OUTPATIENT)
Age: 76
End: 2021-08-04

## 2021-08-04 VITALS — OXYGEN SATURATION: 98 % | DIASTOLIC BLOOD PRESSURE: 70 MMHG | HEART RATE: 77 BPM | SYSTOLIC BLOOD PRESSURE: 138 MMHG

## 2021-08-04 VITALS — WEIGHT: 128 LBS | TEMPERATURE: 98 F | BODY MASS INDEX: 21.85 KG/M2 | HEIGHT: 64.3 IN

## 2021-08-04 PROCEDURE — 77080 DXA BONE DENSITY AXIAL: CPT | Mod: GA

## 2021-08-04 PROCEDURE — 99214 OFFICE O/P EST MOD 30 MIN: CPT | Mod: 25

## 2021-08-04 PROCEDURE — ZZZZZ: CPT

## 2021-08-05 LAB
25(OH)D3 SERPL-MCNC: 53.2 NG/ML
ALBUMIN SERPL ELPH-MCNC: 4.9 G/DL
ALP BLD-CCNC: 38 U/L
ALT SERPL-CCNC: 17 U/L
ANION GAP SERPL CALC-SCNC: 18 MMOL/L
AST SERPL-CCNC: 23 U/L
BILIRUB SERPL-MCNC: 0.2 MG/DL
BUN SERPL-MCNC: 16 MG/DL
CALCIUM SERPL-MCNC: 9.8 MG/DL
CHLORIDE SERPL-SCNC: 97 MMOL/L
CO2 SERPL-SCNC: 25 MMOL/L
CREAT SERPL-MCNC: 0.7 MG/DL
GLUCOSE SERPL-MCNC: 98 MG/DL
POTASSIUM SERPL-SCNC: 4.1 MMOL/L
PROT SERPL-MCNC: 7.3 G/DL
SODIUM SERPL-SCNC: 140 MMOL/L
T3RU NFR SERPL: 1 TBI
T4 SERPL-MCNC: 6 UG/DL
TSH SERPL-ACNC: 5.87 UIU/ML

## 2021-08-05 NOTE — PROCEDURE
[FreeTextEntry1] : Bone mineral density: 08/04/2021 \par Indication: vs. 2020 assess response to medication\par Spine: -2.1 osteopenia, no significant change, +6% vs. 2019\par Total hip: -1.6 osteopenia, +4.3%\par Femoral neck: -2.3 osteopenia, +4.4%\par Proximal radius: -1.7 osteopenia, -3.7%\par \par Bone mineral density 8/28/20\par indication: vs 2019 prior study showed rapid bone loss assess response to medication \par spine - 2.3 osteopenia +3.2%\par total hip -1.9 osteopenia no significant change\par femoral neck -2.5 osteoporosis no significant change although decreased versus 2017\par proximal radius -1.3 osteopenia no significant change\par \par Bone mineral density: 07/22/2019 \par Indication: vs 2018, prior test showed decreasing BMD\par Spine: -2.5, osteoporosis -5.9%\par Total hip: -1.7, osteopenia, no significant change \par Femoral neck: -2.4, osteopenia, no significant change \par Proximal radius: -1.6, osteopenia, no significant change \par \par Bone mineral density: 07/18/2018 \par Indication: vs 2017 assess response to medication \par Spine: -2.1 osteopenia, no significant change \par Total hip: -1.7 osteopenia, no significant change \par Femoral neck: -2.3 osteopenia (-4.3%)\par Proximal radius: -1.6 osteopenia, no significant change \par \par Bone mineral density July 18, 2017\par Indication prior test showed rapid bone loss\par Spine -2.0, osteopenia, prior outside test -1.5\par Total hip - 1.6, osteopenia, No prior report\par Femoral neck -2.1, osteopenia, prior test -2.4\par Proximal radius -1.5, osteopenia no prior

## 2021-08-05 NOTE — ASSESSMENT
[Bisphosphonate Therapy] : Risks and benefits of bisphosphonate therapy were  discussed with the patient including gastroesophageal irritation, osteonecrosis of the jaw, and atypical femur fractures, and acute phase reaction [FreeTextEntry1] : 76 year-old female postmenopausal osteoporosis for many years. \par \par Pt has been on drug holiday since 2013. BMD 2016 appeared to show decreasing hip value. Repeat test 7/2017 shows osteopenia which was fairly stable. The spine and hip decreased versus the prior outside study but this remains fairly mild. Pt was concerned about restarting oral medications of active GERD.\par \par BMD 2018 indicated stable osteopenia in all sites with slight decrease in bone density in the fem neck. No osteoporosis related fx, Minor falls/slips in 2/2018 and 5/2018 (saw podiatrist). 7/2018 fell, no fx, just back pain. BMD 7/2019 indicates stability in total hip, fem neck, and proximal radius, all c/w osteopenia. Slight decrease in spine, results c/w osteoporosis. Pt began Actonel 07/2019 but experienced UGI sx. Changed to Reclast first dose 2/2020. Tolerated well. No thigh pain, no interval fx, no APR. No ONJ. BMD 8/2020 increased spine, no significant change hip, although decreased vs 2017. BMD 8/2021 indicate stable osteopenia in spine, but improved vs. 2019, and improving osteopenia in hip, and slightly worsened osteopenia in proximal radius. BMD results reviewed w/ pt. Recent research with IV Reclast which looked at 18 month cycles of dosing instead of 12 months, with 6 year safety data. Recommend pt have another dose IV Reclast. Referred to Rheumatology.\par h/o Subclinical hypothyroidism clinically euthyroid on physical examination.  Repeat thyroid function tests\par F/u in 1 year after IV Reclast dose w/ BMD [FreeTextEntry3] : IV Reclast

## 2021-08-05 NOTE — END OF VISIT
[FreeTextEntry3] : I, Apollo Martinez, authored this note working as a medical scribe for Dr. Sierra.  08/04/2021. 11:00AM. This note was authored by the medical scribe for me. I have reviewed, edited, and revised the note as needed. I am in agreement with the exam findings, imaging findings, and treatment plan.  Russell Sierra MD

## 2021-08-05 NOTE — PAST MEDICAL HISTORY
[Menopause Age____] : age at menopause was [unfilled] [History of Hormone Replacement Treatment] : has a history of hormone replacement treatment [de-identified] : HRT x 5 years

## 2021-08-05 NOTE — HISTORY OF PRESENT ILLNESS
[Alendronate (Fosomax)] : Alendronate [Risedronate (Actonel)] : Risedronate [Zoledronic Acid (Zometa)] : Zoledronic Acid [FreeTextEntry1] : No significant interval health changes. No interval surgery, hospitalizations, fractures, or change in medications.\par \par Pt has been dx-ed with osteoporosis for many years. She took Fosamax for about 5 years, on drug holiday since 2013. A repeat BMD test June 2016 showed decreasing values the lowest -2.4 and femoral neck. BMD 2017 showed mild decrease in bone density in spine and hip, c/w osteopenia. No osteoporosis related fx, or any recent fx despite minor falls/slips in 2/2018 and 5/2018 (saw podiatrist). 7/2018 fell, no fx, just back pain. Pt has an active lifestyle. Pt restarted medication with Actonel 07/2019. Pt c/o UGI sx. \par Changed to Reclast first dose 2/2020. Tolerated well. No thigh pain, no interval fx, no APR. Last DDS within past 6 months. No ONJ. BMD 8/2020 increased spine, no significant change hip, although decreased vs 2017.\par \par Prior cervical arthrodesis due to fall down the stairs 1989.\par \par Pt had cataract surgery. Previously c/o floaters and glares in vision.\par \par Pt planning to have basal carcinoma removed from eyelid 8/2021.

## 2021-08-19 ENCOUNTER — APPOINTMENT (OUTPATIENT)
Dept: RHEUMATOLOGY | Facility: CLINIC | Age: 76
End: 2021-08-19
Payer: MEDICARE

## 2021-08-19 PROCEDURE — 96374 THER/PROPH/DIAG INJ IV PUSH: CPT

## 2021-09-10 ENCOUNTER — OUTPATIENT (OUTPATIENT)
Dept: OUTPATIENT SERVICES | Facility: HOSPITAL | Age: 76
LOS: 1 days | End: 2021-09-10
Payer: MEDICARE

## 2021-09-10 VITALS
TEMPERATURE: 98 F | HEART RATE: 76 BPM | DIASTOLIC BLOOD PRESSURE: 70 MMHG | OXYGEN SATURATION: 99 % | RESPIRATION RATE: 16 BRPM | SYSTOLIC BLOOD PRESSURE: 110 MMHG | WEIGHT: 128.97 LBS | HEIGHT: 64.5 IN

## 2021-09-10 DIAGNOSIS — Z98.41 CATARACT EXTRACTION STATUS, RIGHT EYE: Chronic | ICD-10-CM

## 2021-09-10 DIAGNOSIS — Z85.828 PERSONAL HISTORY OF OTHER MALIGNANT NEOPLASM OF SKIN: Chronic | ICD-10-CM

## 2021-09-10 DIAGNOSIS — Q76.1 KLIPPEL-FEIL SYNDROME: Chronic | ICD-10-CM

## 2021-09-10 DIAGNOSIS — Z98.42 CATARACT EXTRACTION STATUS, LEFT EYE: Chronic | ICD-10-CM

## 2021-09-10 DIAGNOSIS — C44.1192 BASAL CELL CARCINOMA OF SKIN OF LEFT LOWER EYELID, INCLUDING CANTHUS: ICD-10-CM

## 2021-09-10 DIAGNOSIS — Z98.890 OTHER SPECIFIED POSTPROCEDURAL STATES: Chronic | ICD-10-CM

## 2021-09-10 LAB
HCT VFR BLD CALC: 40.3 % — SIGNIFICANT CHANGE UP (ref 34.5–45)
HGB BLD-MCNC: 13.7 G/DL — SIGNIFICANT CHANGE UP (ref 11.5–15.5)
MCHC RBC-ENTMCNC: 31.9 PG — SIGNIFICANT CHANGE UP (ref 27–34)
MCHC RBC-ENTMCNC: 34 GM/DL — SIGNIFICANT CHANGE UP (ref 32–36)
MCV RBC AUTO: 93.9 FL — SIGNIFICANT CHANGE UP (ref 80–100)
NRBC # BLD: 0 /100 WBCS — SIGNIFICANT CHANGE UP
NRBC # FLD: 0 K/UL — SIGNIFICANT CHANGE UP
PLATELET # BLD AUTO: 212 K/UL — SIGNIFICANT CHANGE UP (ref 150–400)
RBC # BLD: 4.29 M/UL — SIGNIFICANT CHANGE UP (ref 3.8–5.2)
RBC # FLD: 12.5 % — SIGNIFICANT CHANGE UP (ref 10.3–14.5)
WBC # BLD: 6.83 K/UL — SIGNIFICANT CHANGE UP (ref 3.8–10.5)
WBC # FLD AUTO: 6.83 K/UL — SIGNIFICANT CHANGE UP (ref 3.8–10.5)

## 2021-09-10 PROCEDURE — 93010 ELECTROCARDIOGRAM REPORT: CPT

## 2021-09-10 RX ORDER — MULTIVIT-MIN/FERROUS GLUCONATE 9 MG/15 ML
1 LIQUID (ML) ORAL
Qty: 0 | Refills: 0 | DISCHARGE

## 2021-09-10 NOTE — H&P PST ADULT - NSICDXPASTMEDICALHX_GEN_ALL_CORE_FT
PAST MEDICAL HISTORY:  Basal cell carcinoma     GERD (gastroesophageal reflux disease)     HLD (hyperlipidemia)     IBS (irritable bowel syndrome)     IFG (impaired fasting glucose)     Reactive hypertension
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
## 2021-09-10 NOTE — H&P PST ADULT - NEGATIVE ENMT SYMPTOMS
no hearing difficulty/no ear pain/no tinnitus/no vertigo/no sinus symptoms/no nasal congestion/no nasal discharge/no nasal obstruction/no post-nasal discharge/no nose bleeds/no abnormal taste sensation/no dry mouth/no throat pain/no dysphagia

## 2021-09-10 NOTE — H&P PST ADULT - ATTENDING COMMENTS
For reconstruction left lower eyelid with needed grafts and flaps from any site right/left.  Risks, benefits, options reviewed.  All questions answered.

## 2021-09-10 NOTE — H&P PST ADULT - NEGATIVE GENERAL GENITOURINARY SYMPTOMS
no hematuria/no renal colic/no flank pain L/no flank pain R/no urine discoloration/no gas in urine/no bladder infections/no dysuria/no urinary hesitancy/normal urinary frequency

## 2021-09-10 NOTE — H&P PST ADULT - NECK DETAILS
supple/no JVD/normal thyroid gland limited ROM on hyperextension, lateral bending/supple/no JVD/normal thyroid gland

## 2021-09-10 NOTE — H&P PST ADULT - PROBLEM SELECTOR PLAN 1
Scheduled for left lower eyelid reconstruction after Moh's tentatively on 09/27/2021. Pre op instructions, famotidine given and explained. Pt verbalized understanding.  Pt instructed to contact surgeon's office regarding Covid test pre op. Pt verbalized understanding.

## 2021-09-10 NOTE — H&P PST ADULT - NSANTHOSAYNRD_GEN_A_CORE
No. STEVAN screening performed.  STOP BANG Legend: 0-2 = LOW Risk; 3-4 = INTERMEDIATE Risk; 5-8 = HIGH Risk

## 2021-09-10 NOTE — H&P PST ADULT - NSICDXPASTSURGICALHX_GEN_ALL_CORE_FT
PAST SURGICAL HISTORY:  Cervical fusion syndrome     H/O benign breast biopsy     H/O cataract extraction, left     History of Mohs micrographic surgery for skin cancer     S/P cataract surgery, right

## 2021-09-10 NOTE — H&P PST ADULT - HISTORY OF PRESENT ILLNESS
77 y/o female with H/O:  77 y/o female presents to PST for pre ope valuation with pre op diagnosis: BCC skin / left lower eyelid. Schedule for left lower eyelid reconstruction after Moh's

## 2021-09-14 ENCOUNTER — NON-APPOINTMENT (OUTPATIENT)
Age: 76
End: 2021-09-14

## 2021-09-24 ENCOUNTER — TRANSCRIPTION ENCOUNTER (OUTPATIENT)
Age: 76
End: 2021-09-24

## 2021-09-24 ENCOUNTER — NON-APPOINTMENT (OUTPATIENT)
Age: 76
End: 2021-09-24

## 2021-09-24 VITALS
RESPIRATION RATE: 18 BRPM | HEIGHT: 64.5 IN | HEART RATE: 63 BPM | SYSTOLIC BLOOD PRESSURE: 168 MMHG | OXYGEN SATURATION: 100 % | TEMPERATURE: 98 F | WEIGHT: 128.97 LBS | DIASTOLIC BLOOD PRESSURE: 58 MMHG

## 2021-09-26 ENCOUNTER — TRANSCRIPTION ENCOUNTER (OUTPATIENT)
Age: 76
End: 2021-09-26

## 2021-09-27 ENCOUNTER — OUTPATIENT (OUTPATIENT)
Dept: OUTPATIENT SERVICES | Facility: HOSPITAL | Age: 76
LOS: 1 days | Discharge: HOME | End: 2021-09-27

## 2021-09-27 VITALS
DIASTOLIC BLOOD PRESSURE: 80 MMHG | OXYGEN SATURATION: 99 % | HEART RATE: 66 BPM | TEMPERATURE: 97 F | RESPIRATION RATE: 15 BRPM | SYSTOLIC BLOOD PRESSURE: 123 MMHG

## 2021-09-27 DIAGNOSIS — Z98.42 CATARACT EXTRACTION STATUS, LEFT EYE: Chronic | ICD-10-CM

## 2021-09-27 DIAGNOSIS — Z85.828 PERSONAL HISTORY OF OTHER MALIGNANT NEOPLASM OF SKIN: Chronic | ICD-10-CM

## 2021-09-27 DIAGNOSIS — Z98.890 OTHER SPECIFIED POSTPROCEDURAL STATES: Chronic | ICD-10-CM

## 2021-09-27 DIAGNOSIS — C44.1192 BASAL CELL CARCINOMA OF SKIN OF LEFT LOWER EYELID, INCLUDING CANTHUS: ICD-10-CM

## 2021-09-27 DIAGNOSIS — Z98.41 CATARACT EXTRACTION STATUS, RIGHT EYE: Chronic | ICD-10-CM

## 2021-09-27 DIAGNOSIS — Q76.1 KLIPPEL-FEIL SYNDROME: Chronic | ICD-10-CM

## 2021-09-27 NOTE — BRIEF OPERATIVE NOTE - OPERATION/FINDINGS
Tarsal strip from upper lid as well as full thickness pedicled flap from upper lid to lower lid for Moh's reconstruction

## 2021-11-28 ENCOUNTER — TRANSCRIPTION ENCOUNTER (OUTPATIENT)
Age: 76
End: 2021-11-28

## 2021-11-28 NOTE — PAST MEDICAL HISTORY
[Menopause Age____] : age at menopause was [unfilled] [History of Hormone Replacement Treatment] : has a history of hormone replacement treatment [de-identified] : HRT x 5 years 2.36

## 2021-12-01 ENCOUNTER — APPOINTMENT (OUTPATIENT)
Dept: INTERNAL MEDICINE | Facility: CLINIC | Age: 76
End: 2021-12-01
Payer: MEDICARE

## 2021-12-01 ENCOUNTER — NON-APPOINTMENT (OUTPATIENT)
Age: 76
End: 2021-12-01

## 2021-12-01 VITALS — SYSTOLIC BLOOD PRESSURE: 138 MMHG | DIASTOLIC BLOOD PRESSURE: 68 MMHG | HEART RATE: 68 BPM | RESPIRATION RATE: 14 BRPM

## 2021-12-01 VITALS — BODY MASS INDEX: 21.59 KG/M2 | HEIGHT: 64.5 IN | WEIGHT: 128 LBS

## 2021-12-01 DIAGNOSIS — R10.84 GENERALIZED ABDOMINAL PAIN: ICD-10-CM

## 2021-12-01 PROCEDURE — G0439: CPT

## 2021-12-01 PROCEDURE — G0444 DEPRESSION SCREEN ANNUAL: CPT

## 2021-12-01 RX ORDER — VIBEGRON 75 MG/1
75 TABLET, FILM COATED ORAL
Refills: 0 | Status: ACTIVE | COMMUNITY
Start: 2021-12-01

## 2021-12-01 RX ORDER — SUCRALFATE 1 G/10ML
1 SUSPENSION ORAL 4 TIMES DAILY
Qty: 560 | Refills: 0 | Status: DISCONTINUED | COMMUNITY
Start: 2021-06-08 | End: 2021-12-01

## 2021-12-01 RX ORDER — SOLIFENACIN SUCCINATE 5 MG/1
5 TABLET ORAL
Refills: 0 | Status: COMPLETED | COMMUNITY
Start: 2020-11-20 | End: 2021-12-01

## 2021-12-01 NOTE — PHYSICAL EXAM
[Well Nourished] : well nourished [Well Developed] : well developed [Well-Appearing] : well-appearing [Normal Sclera/Conjunctiva] : normal sclera/conjunctiva [PERRL] : pupils equal round and reactive to light [EOMI] : extraocular movements intact [Normal Outer Ear/Nose] : the outer ears and nose were normal in appearance [Normal Oropharynx] : the oropharynx was normal [No JVD] : no jugular venous distention [No Lymphadenopathy] : no lymphadenopathy [Supple] : supple [Thyroid Normal, No Nodules] : the thyroid was normal and there were no nodules present [No Respiratory Distress] : no respiratory distress  [No Accessory Muscle Use] : no accessory muscle use [Clear to Auscultation] : lungs were clear to auscultation bilaterally [Normal Rate] : normal rate  [Regular Rhythm] : with a regular rhythm [Normal S1, S2] : normal S1 and S2 [No Murmur] : no murmur heard [No Carotid Bruits] : no carotid bruits [No Abdominal Bruit] : a ~M bruit was not heard ~T in the abdomen [Pedal Pulses Present] : the pedal pulses are present [No Edema] : there was no peripheral edema [No Palpable Aorta] : no palpable aorta [No Extremity Clubbing/Cyanosis] : no extremity clubbing/cyanosis [Normal Appearance] : normal in appearance [No Nipple Discharge] : no nipple discharge [Soft] : abdomen soft [Non Tender] : non-tender [Non-distended] : non-distended [No Masses] : no abdominal mass palpated [No HSM] : no HSM [Normal Bowel Sounds] : normal bowel sounds [Normal Posterior Cervical Nodes] : no posterior cervical lymphadenopathy [Normal Anterior Cervical Nodes] : no anterior cervical lymphadenopathy [No CVA Tenderness] : no CVA  tenderness [No Spinal Tenderness] : no spinal tenderness [No Joint Swelling] : no joint swelling [Grossly Normal Strength/Tone] : grossly normal strength/tone [No Rash] : no rash [Coordination Grossly Intact] : coordination grossly intact [No Focal Deficits] : no focal deficits [Normal Gait] : normal gait [Deep Tendon Reflexes (DTR)] : deep tendon reflexes were 2+ and symmetric [Normal Affect] : the affect was normal [Normal Insight/Judgement] : insight and judgment were intact [No Axillary Lymphadenopathy] : no axillary lymphadenopathy [de-identified] : LE varicosities

## 2021-12-01 NOTE — HISTORY OF PRESENT ILLNESS
[de-identified] : Last seen one year ago. Patient has been generally well without any significant intercurrent issues or problems. Adherent with medications as noted without side effects. Appetite good and weight reportedly stable. Active with good exercise tolerance. No sx of chest pain, sob, palpitations, orthopnea, PND, HECTOR, edema, lightheadedness..\par \par  exposed and recently dx'd with Covid 19-  he is asx. Pt was tested and negative for both rapid and PCR. BOth vaccinated

## 2021-12-01 NOTE — HEALTH RISK ASSESSMENT
[Very Good] : ~his/her~  mood as very good [Yes] : Yes [4 or more  times a week (4 pts)] : 4 or more  times a week (4 points) [1 or 2 (0 pts)] : 1 or 2 (0 points) [Never (0 pts)] : Never (0 points) [No] : In the past 12 months have you used drugs other than those required for medical reasons? No [0] : 2) Feeling down, depressed, or hopeless: Not at all (0) [HIV test declined] : HIV test declined [Hepatitis C test declined] : Hepatitis C test declined [None] : None [Alone] : lives alone [Retired] : retired [College] : College [] :  [# Of Children ___] : has [unfilled] children [Feels Safe at Home] : Feels safe at home [Fully functional (bathing, dressing, toileting, transferring, walking, feeding)] : Fully functional (bathing, dressing, toileting, transferring, walking, feeding) [Fully functional (using the telephone, shopping, preparing meals, housekeeping, doing laundry, using] : Fully functional and needs no help or supervision to perform IADLs (using the telephone, shopping, preparing meals, housekeeping, doing laundry, using transportation, managing medications and managing finances) [Reports normal functional visual acuity (ie: able to read med bottle)] : Reports normal functional visual acuity [Seat Belt] :  uses seat belt [No falls in past year] : Patient reported no falls in the past year [PHQ-2 Negative - No further assessment needed] : PHQ-2 Negative - No further assessment needed [With Patient/Caregiver] : , with patient/caregiver [Reviewed no changes] : Reviewed, no changes [Designated Healthcare Proxy] : Designated healthcare proxy [Name: ___] : Health Care Proxy's Name: [unfilled]  [I will adhere to the patient's wishes.] : I will adhere to the patient's wishes. [FreeTextEntry1] : Sleep issues recently [] : No [de-identified] : No ED or Hospital; [facility] for covid testing [de-identified] :  Bone Endo; GI.  [Audit-CScore] : 4 [de-identified] :  walking and watching grandchildren [de-identified] : Eats all foods- low fat- a lot of fruits and vegetables [de-identified] : 10/20- tripped over something left on floor by grandchild- no significant injury [facility] [CVH2Btfip] : 0 [Change in mental status noted] : No change in mental status noted [Language] : denies difficulty with language [Behavior] : denies difficulty with behavior [Learning/Retaining New Information] : denies difficulty learning/retaining new information [Handling Complex Tasks] : denies difficulty handling complex tasks [Reasoning] : denies difficulty with reasoning [Spatial Ability and Orientation] : denies difficulty with spatial ability and orientation [Sexually Active] : not sexually active [Reports changes in hearing] : Reports no changes in hearing [Reports changes in vision] : Reports no changes in vision [Reports changes in dental health] : Reports no changes in dental health [Smoke Detector] : no smoke detector [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

## 2021-12-01 NOTE — ASSESSMENT
[FreeTextEntry1] : \par 1. HCM--immun- ok,- flu shot this season- covid + booster\par  last colon 10/16 and neg- f/u 10 yrs\par  mammo July 2021 neg\par  gyn- July 2021 Dr.Steven Babcock- issue of terminating screen\par  normal ecg in 2/19\par  Hep C screening neg 9/12\par  TSH 11/18 last- borderline high with normal free t3/t4- monitor expectantly- asx\par  Depression screening- negative\par  HIV screening offered and deferred\par \par  2. Hyperlipidemia- diet controlled- last - calculated CV Pooled Risk Cohort greater than 5-7.5. However had cardiac ct in June 2014 with clean coronaries- pt against adding a statin and as no cad and as over aged 65,hard to argue that needs medication\par \par  3. h/o IFG- HBA1 6.1 and FBS 98 in Sept 2016- + fam hx of DM- adheres with healthy lifestyle No sx of Diabetes- glucose 101 with fructosamine- normal 11/18\par  \par  4.GI- IBS- colitis 2012- presumed infectious- treated- resolved. CT abd- neg. EGD in 9/12- inflammation- EGD and colon 10/16- essentially neg- see reports. Nutritionist support in past\par  \par  5. Osteoporosis- on fosamax from 1/07 and improved DEXA 2009. Off fosamax since 4/10 because of GERD sx- Boniva started 8/10 with the intention to treat for at least 5 years total of bisphosphonates.. Off Boniva in June 2012 with improved GI sx\par  \par Essentially had 5 years of treatment- d/c'd in June 2012 after DEXA results- continued drug holiday and repeated DEXA in June 2014 - basically stable- Repeat DEXA 6/16- worsening T scores in fem neck with T -2.4- unable to resume bisphosphonate for GI issues- Saw Dr Sierra in consultation July 2017 and had DEXA repeated- advised continued drug holiday and repeat testing in one year. Seen again 7/18 with BMD indicating slight decrease in density in fem neck- advised continued drug holiday- Then Actonel restarted July 2019- see notes- f/u 11/19 an dc'd med due to GI side effects- had one dose IV reclast 11/19- and second dose in Aug 2021\par  \par  6. remote h/o melanoma 1991- and basal cell- sees derm Dr. Frost--last appt July 2020 goes every 6 mos-\par  \par  7. Hypertension with reactive component- Had normal 24 hour ambulatory BP monitor in remote past and repeated again in 9/13- only 4/35 daytime readings above 140 (11%)- and highest systolic . Given current guidelines and after discussion with pt opted for continued monitoring without antihypertensive meds.. In fall, 2012- neg urine ma and normal echo- no LVH. No clinical TOD. 24 hour ambu BP monitor repeated 10/15- overall average /67- 18% of readings above optimal for systolic- 163 systolic the highest.; lowest systolic 88 at 3:15 am. Pt not a dipper.\par \par  Chlorthalidone 12.5 (0.5 25 mg tab) started 7/15. Home monitoring all with systolics less than 140 BP- mostly in 120's systolic- Home cuff assessed as accurate in office. Continue current meds and monitoring- reports home BP's all normal and intercurrent MD visits\par \par 6. Fall with head trauma but no sequela- 3/17- Fall prevention reviewed AT LENGTH- and fall 3/18- tripped on dog's leash-April 2018- missed step at home- landed on toes-  10/20 tripped on toy on the floor- FALL PREVENTION REVIEWED AT LENGTH\par \par \par  f/u 6 mos or prn- reviewed labs done in August Dr CHEN- see no need to repeat anything- Check ECG\par .

## 2021-12-07 ENCOUNTER — TRANSCRIPTION ENCOUNTER (OUTPATIENT)
Age: 76
End: 2021-12-07

## 2021-12-14 ENCOUNTER — RX RENEWAL (OUTPATIENT)
Age: 76
End: 2021-12-14

## 2022-02-08 ENCOUNTER — APPOINTMENT (OUTPATIENT)
Dept: OPHTHALMOLOGY | Facility: CLINIC | Age: 77
End: 2022-02-08
Payer: MEDICARE

## 2022-02-08 ENCOUNTER — NON-APPOINTMENT (OUTPATIENT)
Age: 77
End: 2022-02-08

## 2022-02-08 PROCEDURE — 92014 COMPRE OPH EXAM EST PT 1/>: CPT

## 2022-02-08 PROCEDURE — 92134 CPTRZ OPH DX IMG PST SGM RTA: CPT

## 2022-02-09 ENCOUNTER — APPOINTMENT (OUTPATIENT)
Dept: GASTROENTEROLOGY | Facility: CLINIC | Age: 77
End: 2022-02-09
Payer: MEDICARE

## 2022-02-09 VITALS
SYSTOLIC BLOOD PRESSURE: 125 MMHG | HEIGHT: 64.5 IN | DIASTOLIC BLOOD PRESSURE: 77 MMHG | OXYGEN SATURATION: 96 % | BODY MASS INDEX: 21.59 KG/M2 | WEIGHT: 128 LBS | TEMPERATURE: 97.6 F | HEART RATE: 102 BPM

## 2022-02-09 PROCEDURE — 99213 OFFICE O/P EST LOW 20 MIN: CPT

## 2022-02-09 NOTE — HISTORY OF PRESENT ILLNESS
[FreeTextEntry1] : Syeda presents for follow-up visit. Her GERD is maintained on famotidine 20 mg twice daily and omeprazole 3 times a week. She is not willing to stop the omeprazole. She states that when she tries to come down on omeprazole further she would develop heartburn symptoms. She denies dysphagia or odynophagia. Upper endoscopy from June 2021 with gastritis negative for H. pylori and fundic gland polyps. Again explained to her possible complications of PPI therapy including but not limited to bone loss and formation of gastric polyps. She reports intermittent constipation. She feels incomplete evacuation. No rectal bleeding or melena. She started a probiotic which overall alleviates most of her GI symptoms after eating.

## 2022-02-25 ENCOUNTER — APPOINTMENT (OUTPATIENT)
Dept: OPHTHALMOLOGY | Facility: CLINIC | Age: 77
End: 2022-02-25
Payer: MEDICARE

## 2022-02-25 ENCOUNTER — NON-APPOINTMENT (OUTPATIENT)
Age: 77
End: 2022-02-25

## 2022-02-25 PROCEDURE — 99199 UNLISTED SPECIAL SVC PX/RPRT: CPT | Mod: NC

## 2022-04-06 ENCOUNTER — TRANSCRIPTION ENCOUNTER (OUTPATIENT)
Age: 77
End: 2022-04-06

## 2022-04-19 ENCOUNTER — APPOINTMENT (OUTPATIENT)
Dept: ORTHOPEDIC SURGERY | Facility: CLINIC | Age: 77
End: 2022-04-19
Payer: MEDICARE

## 2022-04-19 VITALS — BODY MASS INDEX: 21.59 KG/M2 | HEIGHT: 64.5 IN | WEIGHT: 128 LBS

## 2022-04-19 PROCEDURE — 99213 OFFICE O/P EST LOW 20 MIN: CPT

## 2022-04-19 NOTE — HISTORY OF PRESENT ILLNESS
[Gradual] : gradual [de-identified] : 3/25/22: L heel pain since 3/16/22. She denies injury. Iced with improvement. No prior L heel injuries. No n/t.\par 4/19/22: F/u L heel 92% improved [] : no [FreeTextEntry1] : l heel

## 2022-06-01 ENCOUNTER — NON-APPOINTMENT (OUTPATIENT)
Age: 77
End: 2022-06-01

## 2022-06-03 ENCOUNTER — NON-APPOINTMENT (OUTPATIENT)
Age: 77
End: 2022-06-03

## 2022-06-03 ENCOUNTER — APPOINTMENT (OUTPATIENT)
Dept: OPHTHALMOLOGY | Facility: CLINIC | Age: 77
End: 2022-06-03
Payer: MEDICARE

## 2022-06-03 PROCEDURE — 92012 INTRM OPH EXAM EST PATIENT: CPT

## 2022-06-29 ENCOUNTER — RX RENEWAL (OUTPATIENT)
Age: 77
End: 2022-06-29

## 2022-06-30 ENCOUNTER — RX RENEWAL (OUTPATIENT)
Age: 77
End: 2022-06-30

## 2022-08-04 ENCOUNTER — APPOINTMENT (OUTPATIENT)
Dept: ENDOCRINOLOGY | Facility: CLINIC | Age: 77
End: 2022-08-04

## 2022-08-04 ENCOUNTER — LABORATORY RESULT (OUTPATIENT)
Age: 77
End: 2022-08-04

## 2022-08-04 VITALS — TEMPERATURE: 97.5 F | HEIGHT: 64.3 IN | WEIGHT: 126 LBS | BODY MASS INDEX: 21.51 KG/M2

## 2022-08-04 VITALS — SYSTOLIC BLOOD PRESSURE: 120 MMHG | OXYGEN SATURATION: 99 % | HEART RATE: 75 BPM | DIASTOLIC BLOOD PRESSURE: 74 MMHG

## 2022-08-04 PROCEDURE — 99214 OFFICE O/P EST MOD 30 MIN: CPT | Mod: 25

## 2022-08-04 PROCEDURE — 77080 DXA BONE DENSITY AXIAL: CPT

## 2022-08-06 LAB
ALBUMIN SERPL ELPH-MCNC: 4.9 G/DL
ALP BLD-CCNC: 38 U/L
ALT SERPL-CCNC: 22 U/L
ANION GAP SERPL CALC-SCNC: 11 MMOL/L
AST SERPL-CCNC: 28 U/L
BILIRUB SERPL-MCNC: 0.3 MG/DL
BUN SERPL-MCNC: 14 MG/DL
CALCIUM SERPL-MCNC: 9.8 MG/DL
CHLORIDE SERPL-SCNC: 97 MMOL/L
CO2 SERPL-SCNC: 30 MMOL/L
CREAT SERPL-MCNC: 0.67 MG/DL
EGFR: 91 ML/MIN/1.73M2
GLUCOSE SERPL-MCNC: 92 MG/DL
POTASSIUM SERPL-SCNC: 4.2 MMOL/L
PROT SERPL-MCNC: 7.2 G/DL
SODIUM SERPL-SCNC: 138 MMOL/L
T3RU NFR SERPL: 1 TBI
T4 SERPL-MCNC: 6.5 UG/DL
TSH SERPL-ACNC: 4.59 UIU/ML

## 2022-08-07 NOTE — ASSESSMENT
[Bisphosphonate Therapy] : Risks and benefits of bisphosphonate therapy were  discussed with the patient including gastroesophageal irritation, osteonecrosis of the jaw, and atypical femur fractures, and acute phase reaction [FreeTextEntry1] : 76 year-old female postmenopausal osteoporosis for many years. \par \par Pt has been on drug holiday since 2013. BMD 2016 appeared to show decreasing hip value. Repeat test 7/2017 shows osteopenia which was fairly stable. The spine and hip decreased versus the prior outside study but this remains fairly mild. Pt was concerned about restarting oral medications of active GERD.\par \par BMD 2018 indicated stable osteopenia in all sites with slight decrease in bone density in the fem neck. No osteoporosis related fx, Minor falls/slips in 2/2018 and 5/2018 (saw podiatrist). 7/2018 fell, no fx, just back pain. BMD 7/2019 indicates stability in total hip, fem neck, and proximal radius, all c/w osteopenia. Slight decrease in spine, results c/w osteoporosis. Pt began Actonel 07/2019 but experienced UGI sx. Changed to Reclast first dose 2/2020 2nd dose 8/21. Tolerated well. No thigh pain, no interval fx, no APR. No ONJ. \par BMD 2022 stable. \par  BMD results reviewed w/ pt. Recent research with IV Reclast which looked at 18 month cycles of dosing instead of 12 months, with 6 year safety data. Can delay next dose for 6-12 months\par h/o Subclinical hypothyroidism clinically euthyroid on physical examination.  Repeat thyroid function tests\par F/u in 6 mos [FreeTextEntry3] : IV Reclast

## 2022-08-07 NOTE — PROCEDURE
[FreeTextEntry1] : Bone mineral density: 08/07/2022\par indication: prior study showed rapid bone loss \par spine -2.0 osteopenia no significant change \par total hip -1.6 osteopenia, no significant change \par femoral neck -2.4 osteopenia, no significant change \par proximal radius -1.8 osteopenia, no significant change \par \par Bone mineral density: 08/04/2021 \par Indication: vs. 2020 assess response to medication\par Spine: -2.1 osteopenia, no significant change, +6% vs. 2019\par Total hip: -1.6 osteopenia, +4.3%\par Femoral neck: -2.3 osteopenia, +4.4%\par Proximal radius: -1.7 osteopenia, -3.7%\par \par Bone mineral density 8/28/20\par indication: vs 2019 prior study showed rapid bone loss assess response to medication \par spine - 2.3 osteopenia +3.2%\par total hip -1.9 osteopenia no significant change\par femoral neck -2.5 osteoporosis no significant change although decreased versus 2017\par proximal radius -1.3 osteopenia no significant change\par \par Bone mineral density: 07/22/2019 \par Indication: vs 2018, prior test showed decreasing BMD\par Spine: -2.5, osteoporosis -5.9%\par Total hip: -1.7, osteopenia, no significant change \par Femoral neck: -2.4, osteopenia, no significant change \par Proximal radius: -1.6, osteopenia, no significant change \par \par Bone mineral density: 07/18/2018 \par Indication: vs 2017 assess response to medication \par Spine: -2.1 osteopenia, no significant change \par Total hip: -1.7 osteopenia, no significant change \par Femoral neck: -2.3 osteopenia (-4.3%)\par Proximal radius: -1.6 osteopenia, no significant change \par \par Bone mineral density July 18, 2017\par Indication prior test showed rapid bone loss\par Spine -2.0, osteopenia, prior outside test -1.5\par Total hip - 1.6, osteopenia, No prior report\par Femoral neck -2.1, osteopenia, prior test -2.4\par Proximal radius -1.5, osteopenia no prior

## 2022-08-07 NOTE — PAST MEDICAL HISTORY
[Menopause Age____] : age at menopause was [unfilled] [History of Hormone Replacement Treatment] : has a history of hormone replacement treatment [de-identified] : HRT x 5 years

## 2022-08-07 NOTE — HISTORY OF PRESENT ILLNESS
[Alendronate (Fosomax)] : Alendronate [Risedronate (Actonel)] : Risedronate [Zoledronic Acid (Zometa)] : Zoledronic Acid [FreeTextEntry1] : No significant interval health changes. No interval surgery, hospitalizations, fractures, or change in medications.\par \par Pt has been dx-ed with osteoporosis for many years. She took Fosamax for about 5 years, on drug holiday since 2013. A repeat BMD test June 2016 showed decreasing values the lowest -2.4 and femoral neck. BMD 2017 showed mild decrease in bone density in spine and hip, c/w osteopenia. No osteoporosis related fx, or any recent fx despite minor falls/slips in 2/2018 and 5/2018 (saw podiatrist). 7/2018 fell, no fx, just back pain. Pt has an active lifestyle. Pt restarted medication with Actonel 07/2019. Pt c/o UGI sx. \par Changed to Reclast first dose 2/2020 2 nd dose 8/2021. Tolerated well. No thigh pain, no interval fx, no APR. Last DDS within past 6 months. No ONJ. BMD 8/2020 increased spine, no significant change hip, although decreased vs 2017.\par

## 2022-08-16 PROBLEM — M72.2 PLANTAR FASCIITIS, LEFT: Status: RESOLVED | Noted: 2022-04-19 | Resolved: 2022-08-16

## 2022-08-16 PROBLEM — K58.0 IRRITABLE BOWEL SYNDROME WITH DIARRHEA: Status: RESOLVED | Noted: 2019-01-30 | Resolved: 2022-08-16

## 2022-08-17 ENCOUNTER — APPOINTMENT (OUTPATIENT)
Dept: INTERNAL MEDICINE | Facility: CLINIC | Age: 77
End: 2022-08-17

## 2022-08-17 ENCOUNTER — NON-APPOINTMENT (OUTPATIENT)
Age: 77
End: 2022-08-17

## 2022-08-17 DIAGNOSIS — K58.0 IRRITABLE BOWEL SYNDROME WITH DIARRHEA: ICD-10-CM

## 2022-08-17 DIAGNOSIS — M72.2 PLANTAR FASCIAL FIBROMATOSIS: ICD-10-CM

## 2022-08-17 PROCEDURE — 99442: CPT | Mod: CS,95

## 2022-08-17 RX ORDER — DICYCLOMINE HYDROCHLORIDE 10 MG/1
10 CAPSULE ORAL EVERY 6 HOURS
Qty: 120 | Refills: 5 | Status: DISCONTINUED | COMMUNITY
Start: 2021-02-08 | End: 2022-08-17

## 2022-08-17 RX ORDER — CONJUGATED ESTROGENS 0.62 MG/G
CREAM VAGINAL
Refills: 0 | Status: DISCONTINUED | COMMUNITY
End: 2022-08-17

## 2022-08-18 ENCOUNTER — NON-APPOINTMENT (OUTPATIENT)
Age: 77
End: 2022-08-18

## 2022-08-19 NOTE — ASSESSMENT
[FreeTextEntry1] : \par 1. HCM--immun- ok,- flu shot this season- covid + booster\par  last colon 10/16 and neg- f/u 10 yrs\par  mammo July 2022- us guided bx advised- scheduled for MOnda 1/22\par  gyn- July 2021 Dr.Steven Babcock- issue of terminating screen\par  normal ecg in 2021\par  Hep C screening neg 9/12\par  TSH 11/18 last- borderline high with normal free t3/t4- monitor expectantly- asx\par  Depression screening- negative\par  HIV screening offered and deferred\par \par  2. Hyperlipidemia- diet controlled- last - calculated CV Pooled Risk Cohort greater than 5-7.5. However had cardiac ct in June 2014 with clean coronaries- pt against adding a statin and as no cad and as over aged 65,hard to argue that needs medication\par \par  3. h/o IFG- HBA1 6.1 and FBS 98 in Sept 2016- + fam hx of DM- adheres with healthy lifestyle No sx of Diabetes- glucose 101 with fructosamine- normal 11/18\par  \par  4.GI- IBS- colitis 2012- presumed infectious- treated- resolved. CT abd- neg. EGD in 9/12- inflammation- EGD and colon 10/16- essentially neg- see reports. Nutritionist support in past\par  \par  5. Osteoporosis- on fosamax from 1/07 and improved DEXA 2009. Off fosamax since 4/10 because of GERD sx- Boniva started 8/10 with the intention to treat for at least 5 years total of bisphosphonates.. Off Boniva in June 2012 with improved GI sx\par  \par Essentially had 5 years of treatment- d/c'd in June 2012 after DEXA results- continued drug holiday and repeated DEXA in June 2014 - basically stable- Repeat DEXA 6/16- worsening T scores in fem neck with T -2.4- unable to resume bisphosphonate for GI issues- Saw Dr Sierra in consultation July 2017 and had DEXA repeated- advised continued drug holiday and repeat testing in one year. Seen again 7/18 with BMD indicating slight decrease in density in fem neck- advised continued drug holiday- Then Actonel restarted July 2019- see notes- f/u 11/19 an dc'd med due to GI side effects- had one dose IV reclast 11/19- and second dose in Aug 2021- recent visit- plans f/u after 18 mos- next 6 mos- f/u scheduled\par  \par  6. remote h/o melanoma 1991- and basal cell- sees derm Dr. Frost--last appt July 2022 goes every 6 mos-\par  \par  7. Hypertension with reactive component- Had normal 24 hour ambulatory BP monitor in remote past and repeated again in 9/13- only 4/35 daytime readings above 140 (11%)- and highest systolic . Given current guidelines and after discussion with pt opted for continued monitoring without antihypertensive meds.. In fall, 2012- neg urine ma and normal echo- no LVH. No clinical TOD. 24 hour ambu BP monitor repeated 10/15- overall average /67- 18% of readings above optimal for systolic- 163 systolic the highest.; lowest systolic 88 at 3:15 am. Pt not a dipper.\par \par  Chlorthalidone 12.5 (0.5 25 mg tab) started 7/15. Home monitoring all with systolics less than 140 BP- mostly in 120's systolic- Home cuff assessed as accurate in office. Continue current meds and monitoring- reports home BP's all normal and intercurrent MD visits- 120/74 earlier this month with endo\par \par 6. Fall with head trauma but no sequela- 3/17- Fall prevention reviewed AT LENGTH- and fall 3/18- tripped on dog's leash-April 2018- missed step at home- landed on toes- 10/20 tripped on toy on the floor- FALL PREVENTION REVIEWED AT LENGTH\par \par 7. Acute Covid 19- started 3 days ago - candidate for paxlovid- chem 8/4- normal creatinine and LFT-s drug list- no interactions- possible side effects reviewed- and strategies for quarantining etc\par \par 8. Abnormal mammo- scheduled for bx\par \par \par Has appt\par \par

## 2022-08-19 NOTE — HEALTH RISK ASSESSMENT
[de-identified] : No ED or Hospital or ugicare [de-identified] :  Bone Endo; GIrtho and optho [Audit-CScore] : 4 [de-identified] :  walking and watching grandchildren [de-identified] : Eats all foods- low fat- a lot of fruits and vegetables [de-identified] : 10/20- tripped over something left on floor by grandchild- no significant injury [[facility]] : 8 [[id number]] : 0

## 2022-08-19 NOTE — HISTORY OF PRESENT ILLNESS
[FreeTextEntry1] : 76 yo for scheduled f/u GERD, IBD, reactive elevation of BP and recent abnormal screening mammo, and test + for covid [de-identified] : Last seen in Dec. Patient has been generally well without  Adherent with medications as noted without side effects. Appetite good and weight reportedly stable. Active with good exercise tolerance. No sx of chest pain, sob, palpitations, orthopnea, PND, HECTOR, edema, lightheadedness..\par \par States became ill 3 d ago- fatigue. Yesterday had sx of headache, achiness, dry cough and nasal congestion. Had fever to 101.7 yesterday. Today 100.9- sx same. No diarrhea, n, v, sob, etc Had 4 covid vaccines- last in April. No known exposure and has been careful- masking\par

## 2022-08-23 ENCOUNTER — NON-APPOINTMENT (OUTPATIENT)
Age: 77
End: 2022-08-23

## 2022-08-29 ENCOUNTER — NON-APPOINTMENT (OUTPATIENT)
Age: 77
End: 2022-08-29

## 2022-09-07 ENCOUNTER — APPOINTMENT (OUTPATIENT)
Dept: SURGICAL ONCOLOGY | Facility: CLINIC | Age: 77
End: 2022-09-07

## 2022-09-07 VITALS
DIASTOLIC BLOOD PRESSURE: 76 MMHG | HEART RATE: 70 BPM | OXYGEN SATURATION: 97 % | RESPIRATION RATE: 15 BRPM | BODY MASS INDEX: 21.51 KG/M2 | WEIGHT: 126 LBS | HEIGHT: 64 IN | SYSTOLIC BLOOD PRESSURE: 159 MMHG | TEMPERATURE: 98.6 F

## 2022-09-07 PROCEDURE — 99205 OFFICE O/P NEW HI 60 MIN: CPT

## 2022-09-07 NOTE — PHYSICAL EXAM
[Normocephalic] : normocephalic [Atraumatic] : atraumatic [EOMI] : extra ocular movement intact [PERRL] : pupils equal, round and reactive to light [Sclera nonicteric] : sclera nonicteric [Supple] : supple [No Supraclavicular Adenopathy] : no supraclavicular adenopathy [No Cervical Adenopathy] : no cervical adenopathy [Examined in the supine and seated position] : examined in the supine and seated position [Bra Size: ___] : Bra Size: [unfilled] [No dominant masses] : no dominant masses in right breast  [No dominant masses] : no dominant masses left breast [No Nipple Retraction] : no left nipple retraction [No Nipple Discharge] : no left nipple discharge [Breast Mass Right Breast ___cm] : no masses [Breast Nipple Inversion] : nipples not inverted [Breast Nipple Retraction] : nipples not retracted [Breast Nipple Flattening] : nipples not flattened [Breast Nipple Fissures] : nipples not fissured [No Axillary Lymphadenopathy] : no left axillary lymphadenopathy [No Edema] : no edema [No Rashes] : no rashes [No Ulceration] : no ulceration [de-identified] : non-labored respirations  [de-identified] : 12:00 ecchymosis, resolving, small underlying mass, ?hematoma

## 2022-09-07 NOTE — CONSULT LETTER
[Dear  ___] : Dear  [unfilled], [Consult Letter:] : I had the pleasure of evaluating your patient, [unfilled]. [Please see my note below.] : Please see my note below. [Consult Closing:] : Thank you very much for allowing me to participate in the care of this patient.  If you have any questions, please do not hesitate to contact me. [Sincerely,] : Sincerely, [FreeTextEntry3] : Fatmata Millan MD\par Breast Surgeon\par Division of Surgical Oncology\par Department of Surgery\par [address] [place] [address]\[place] [address] \par Tel: [phone]\par Fax: [phone]\par Email: [email or web address]

## 2022-09-07 NOTE — ASSESSMENT
[FreeTextEntry1] : The patient is a 77 year F referred by Dr. 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 First we discussed her histopathology and biomarkers in terms of prognosis and adjuvant therapy. We discussed surgical options including mastectomy versus lumpectomy. She understands that she would not get a better outcome or longer survival with the mastectomy, although risk of local recurrence is lower. After breast conserving surgery, she may see asymmetry in size. She understands that radiation therapy and postoperative evaluation by Medical Oncology are integral parts of breast-conserving treatment and these will be addressed postoperatively. If we proceed with mastectomy, there is a still a chance that radiation will be recommended but less likely.\par  \par If we proceed with lumpectomy, Ms Villareal understands that it will be important to obtain clear margins and there is a 5-10% risk of having to go back for additional tissue. With mastectomy, there is still a small amount of breast tissue (probably on the order of 5%) that remains which will continue to require yearly clinical examination.\par  \par The risks of surgery include bleeding, infection, scarring, numbness, possible discrepancy in breast size with lumpectomy/reconstructed breast.  At the time of lumpectomy, a pre-operative localization of the lesion is required. \par \par An MRI is recommended for further evaluation of disease extent and the possible presence of multicentric or contralateral disease.  \par   \par We discussed the role of sentinel node biopsy. This will confirm the stage and extent of disease. The risk of lymphedema is 5% with a sentinel lymph node biopsy and 15% with an axillary lymph node dissection.\par  \par Preoperative, intraoperative, and postoperative considerations were reviewed including anesthetic management and what she can expect when she is recovering from surgery. Risks, benefits, alternatives, and various management options were discussed with the patient.\par \par  \par Ms Villareal verbalizes her understanding of the procedure and the risks/benefits/complications and alternatives were discussed questions were answered. She knows to call me if she has any additional questions or concerns.\par  \par \par \par Plan:\par - MRI-- pt needs to check compatibility of previous neck surgery wires\par - Medical clearance\par - Plan for Left lumpectomy with MagSeed localization (1 site), L SLNB\par

## 2022-09-07 NOTE — PAST MEDICAL HISTORY
[Postmenopausal] : The patient is postmenopausal [Menarche Age ____] : age at menarche was [unfilled] [Menopause Age____] : age at menopause was [unfilled] [History of Hormone Replacement Treatment] : has no history of hormone replacement treatment [Total Preg ___] : G[unfilled] [Live Births ___] : P[unfilled]  [Age At Live Birth ___] : Age at live birth: [unfilled] [FreeTextEntry5] : dermoid cyst from Rt ovary 1986 [FreeTextEntry6] : none [FreeTextEntry7] : none [FreeTextEntry8] : 2.5 months

## 2022-09-14 ENCOUNTER — NON-APPOINTMENT (OUTPATIENT)
Age: 77
End: 2022-09-14

## 2022-09-20 ENCOUNTER — RESULT REVIEW (OUTPATIENT)
Age: 77
End: 2022-09-20

## 2022-09-20 ENCOUNTER — APPOINTMENT (OUTPATIENT)
Dept: MAMMOGRAPHY | Facility: IMAGING CENTER | Age: 77
End: 2022-09-20

## 2022-09-20 ENCOUNTER — OUTPATIENT (OUTPATIENT)
Dept: OUTPATIENT SERVICES | Facility: HOSPITAL | Age: 77
LOS: 1 days | End: 2022-09-20
Payer: MEDICARE

## 2022-09-20 DIAGNOSIS — Z98.41 CATARACT EXTRACTION STATUS, RIGHT EYE: Chronic | ICD-10-CM

## 2022-09-20 DIAGNOSIS — Z85.828 PERSONAL HISTORY OF OTHER MALIGNANT NEOPLASM OF SKIN: Chronic | ICD-10-CM

## 2022-09-20 DIAGNOSIS — C50.919 MALIGNANT NEOPLASM OF UNSPECIFIED SITE OF UNSPECIFIED FEMALE BREAST: ICD-10-CM

## 2022-09-20 DIAGNOSIS — Z98.890 OTHER SPECIFIED POSTPROCEDURAL STATES: Chronic | ICD-10-CM

## 2022-09-20 DIAGNOSIS — Q76.1 KLIPPEL-FEIL SYNDROME: Chronic | ICD-10-CM

## 2022-09-20 DIAGNOSIS — Z98.42 CATARACT EXTRACTION STATUS, LEFT EYE: Chronic | ICD-10-CM

## 2022-09-20 PROCEDURE — 77063 BREAST TOMOSYNTHESIS BI: CPT | Mod: 26

## 2022-09-20 PROCEDURE — 77063 BREAST TOMOSYNTHESIS BI: CPT

## 2022-09-20 PROCEDURE — G0279: CPT | Mod: 26

## 2022-09-20 PROCEDURE — G0279: CPT

## 2022-09-20 PROCEDURE — 77067 SCR MAMMO BI INCL CAD: CPT | Mod: 26

## 2022-09-20 PROCEDURE — 77066 DX MAMMO INCL CAD BI: CPT | Mod: 26

## 2022-09-20 PROCEDURE — 77067 SCR MAMMO BI INCL CAD: CPT | Mod: XU

## 2022-09-20 PROCEDURE — 82565 ASSAY OF CREATININE: CPT

## 2022-09-20 PROCEDURE — 77066 DX MAMMO INCL CAD BI: CPT

## 2022-09-20 PROCEDURE — 76642 ULTRASOUND BREAST LIMITED: CPT

## 2022-09-20 PROCEDURE — 76642 ULTRASOUND BREAST LIMITED: CPT | Mod: 26,RT

## 2022-09-27 ENCOUNTER — RESULT REVIEW (OUTPATIENT)
Age: 77
End: 2022-09-27

## 2022-09-27 ENCOUNTER — APPOINTMENT (OUTPATIENT)
Dept: ULTRASOUND IMAGING | Facility: IMAGING CENTER | Age: 77
End: 2022-09-27
Payer: MEDICARE

## 2022-09-27 ENCOUNTER — OUTPATIENT (OUTPATIENT)
Dept: OUTPATIENT SERVICES | Facility: HOSPITAL | Age: 77
LOS: 1 days | End: 2022-09-27
Payer: MEDICARE

## 2022-09-27 ENCOUNTER — OUTPATIENT (OUTPATIENT)
Dept: OUTPATIENT SERVICES | Facility: HOSPITAL | Age: 77
LOS: 1 days | End: 2022-09-27

## 2022-09-27 DIAGNOSIS — Z98.42 CATARACT EXTRACTION STATUS, LEFT EYE: Chronic | ICD-10-CM

## 2022-09-27 DIAGNOSIS — Q76.1 KLIPPEL-FEIL SYNDROME: Chronic | ICD-10-CM

## 2022-09-27 DIAGNOSIS — C50.919 MALIGNANT NEOPLASM OF UNSPECIFIED SITE OF UNSPECIFIED FEMALE BREAST: ICD-10-CM

## 2022-09-27 DIAGNOSIS — Z00.8 ENCOUNTER FOR OTHER GENERAL EXAMINATION: ICD-10-CM

## 2022-09-27 DIAGNOSIS — Z98.890 OTHER SPECIFIED POSTPROCEDURAL STATES: Chronic | ICD-10-CM

## 2022-09-27 DIAGNOSIS — Z98.41 CATARACT EXTRACTION STATUS, RIGHT EYE: Chronic | ICD-10-CM

## 2022-09-27 DIAGNOSIS — Z85.828 PERSONAL HISTORY OF OTHER MALIGNANT NEOPLASM OF SKIN: Chronic | ICD-10-CM

## 2022-09-27 PROCEDURE — 88305 TISSUE EXAM BY PATHOLOGIST: CPT | Mod: 26,59

## 2022-09-27 PROCEDURE — 77065 DX MAMMO INCL CAD UNI: CPT

## 2022-09-27 PROCEDURE — 88321 CONSLTJ&REPRT SLD PREP ELSWR: CPT

## 2022-09-27 PROCEDURE — 77065 DX MAMMO INCL CAD UNI: CPT | Mod: 26,RT

## 2022-09-27 PROCEDURE — 19083 BX BREAST 1ST LESION US IMAG: CPT | Mod: RT

## 2022-09-27 PROCEDURE — 88305 TISSUE EXAM BY PATHOLOGIST: CPT | Mod: XU

## 2022-09-27 PROCEDURE — A4648: CPT

## 2022-09-27 PROCEDURE — 19083 BX BREAST 1ST LESION US IMAG: CPT

## 2022-09-29 LAB — SURGICAL PATHOLOGY STUDY: SIGNIFICANT CHANGE UP

## 2022-10-14 ENCOUNTER — NON-APPOINTMENT (OUTPATIENT)
Age: 77
End: 2022-10-14

## 2022-10-14 ENCOUNTER — APPOINTMENT (OUTPATIENT)
Dept: OPHTHALMOLOGY | Facility: CLINIC | Age: 77
End: 2022-10-14

## 2022-10-14 PROCEDURE — 92012 INTRM OPH EXAM EST PATIENT: CPT

## 2022-10-19 ENCOUNTER — OUTPATIENT (OUTPATIENT)
Dept: OUTPATIENT SERVICES | Facility: HOSPITAL | Age: 77
LOS: 1 days | End: 2022-10-19
Payer: MEDICARE

## 2022-10-19 VITALS
DIASTOLIC BLOOD PRESSURE: 76 MMHG | HEIGHT: 66 IN | HEART RATE: 80 BPM | OXYGEN SATURATION: 98 % | WEIGHT: 129.19 LBS | RESPIRATION RATE: 18 BRPM | TEMPERATURE: 98 F | SYSTOLIC BLOOD PRESSURE: 137 MMHG

## 2022-10-19 DIAGNOSIS — C50.919 MALIGNANT NEOPLASM OF UNSPECIFIED SITE OF UNSPECIFIED FEMALE BREAST: ICD-10-CM

## 2022-10-19 DIAGNOSIS — Z98.41 CATARACT EXTRACTION STATUS, RIGHT EYE: Chronic | ICD-10-CM

## 2022-10-19 DIAGNOSIS — Z98.890 OTHER SPECIFIED POSTPROCEDURAL STATES: Chronic | ICD-10-CM

## 2022-10-19 DIAGNOSIS — K21.9 GASTRO-ESOPHAGEAL REFLUX DISEASE WITHOUT ESOPHAGITIS: ICD-10-CM

## 2022-10-19 DIAGNOSIS — Z90.89 ACQUIRED ABSENCE OF OTHER ORGANS: Chronic | ICD-10-CM

## 2022-10-19 DIAGNOSIS — Z85.828 PERSONAL HISTORY OF OTHER MALIGNANT NEOPLASM OF SKIN: Chronic | ICD-10-CM

## 2022-10-19 DIAGNOSIS — Q76.1 KLIPPEL-FEIL SYNDROME: Chronic | ICD-10-CM

## 2022-10-19 DIAGNOSIS — Z01.818 ENCOUNTER FOR OTHER PREPROCEDURAL EXAMINATION: ICD-10-CM

## 2022-10-19 DIAGNOSIS — Z98.42 CATARACT EXTRACTION STATUS, LEFT EYE: Chronic | ICD-10-CM

## 2022-10-19 LAB
ANION GAP SERPL CALC-SCNC: 6 MMOL/L — SIGNIFICANT CHANGE UP (ref 5–17)
BUN SERPL-MCNC: 17 MG/DL — SIGNIFICANT CHANGE UP (ref 7–23)
CALCIUM SERPL-MCNC: 9.5 MG/DL — SIGNIFICANT CHANGE UP (ref 8.4–10.5)
CHLORIDE SERPL-SCNC: 99 MMOL/L — SIGNIFICANT CHANGE UP (ref 96–108)
CO2 SERPL-SCNC: 32 MMOL/L — HIGH (ref 22–31)
CREAT SERPL-MCNC: 0.76 MG/DL — SIGNIFICANT CHANGE UP (ref 0.5–1.3)
EGFR: 82 ML/MIN/1.73M2 — SIGNIFICANT CHANGE UP
GLUCOSE SERPL-MCNC: 104 MG/DL — HIGH (ref 70–99)
HCT VFR BLD CALC: 40.2 % — SIGNIFICANT CHANGE UP (ref 34.5–45)
HGB BLD-MCNC: 13.6 G/DL — SIGNIFICANT CHANGE UP (ref 11.5–15.5)
MCHC RBC-ENTMCNC: 31.9 PG — SIGNIFICANT CHANGE UP (ref 27–34)
MCHC RBC-ENTMCNC: 33.8 GM/DL — SIGNIFICANT CHANGE UP (ref 32–36)
MCV RBC AUTO: 94.1 FL — SIGNIFICANT CHANGE UP (ref 80–100)
NRBC # BLD: 0 /100 WBCS — SIGNIFICANT CHANGE UP (ref 0–0)
PLATELET # BLD AUTO: 242 K/UL — SIGNIFICANT CHANGE UP (ref 150–400)
POTASSIUM SERPL-MCNC: 3.6 MMOL/L — SIGNIFICANT CHANGE UP (ref 3.5–5.3)
POTASSIUM SERPL-SCNC: 3.6 MMOL/L — SIGNIFICANT CHANGE UP (ref 3.5–5.3)
RBC # BLD: 4.27 M/UL — SIGNIFICANT CHANGE UP (ref 3.8–5.2)
RBC # FLD: 12.8 % — SIGNIFICANT CHANGE UP (ref 10.3–14.5)
SODIUM SERPL-SCNC: 137 MMOL/L — SIGNIFICANT CHANGE UP (ref 135–145)
WBC # BLD: 6.66 K/UL — SIGNIFICANT CHANGE UP (ref 3.8–10.5)
WBC # FLD AUTO: 6.66 K/UL — SIGNIFICANT CHANGE UP (ref 3.8–10.5)

## 2022-10-19 PROCEDURE — 36415 COLL VENOUS BLD VENIPUNCTURE: CPT

## 2022-10-19 PROCEDURE — 85027 COMPLETE CBC AUTOMATED: CPT

## 2022-10-19 PROCEDURE — 93010 ELECTROCARDIOGRAM REPORT: CPT | Mod: NC

## 2022-10-19 PROCEDURE — G0463: CPT

## 2022-10-19 PROCEDURE — 93005 ELECTROCARDIOGRAM TRACING: CPT

## 2022-10-19 PROCEDURE — 80048 BASIC METABOLIC PNL TOTAL CA: CPT

## 2022-10-19 RX ORDER — ESTROGENS, CONJUGATED 0.625 MG/G
1 CREAM WITH APPLICATOR VAGINAL
Qty: 0 | Refills: 0 | DISCHARGE

## 2022-10-19 RX ORDER — DOCUSATE SODIUM 100 MG
1 CAPSULE ORAL
Qty: 0 | Refills: 0 | DISCHARGE

## 2022-10-19 RX ORDER — SOLIFENACIN SUCCINATE 10 MG/1
1 TABLET ORAL
Qty: 0 | Refills: 0 | DISCHARGE

## 2022-10-19 RX ORDER — ALUMINUM ZIRCONIUM TRICHLOROHYDREX GLY 0.2 G/G
1 STICK TOPICAL
Qty: 0 | Refills: 0 | DISCHARGE

## 2022-10-19 NOTE — H&P PST ADULT - NSICDXPASTMEDICALHX_GEN_ALL_CORE_FT
PAST MEDICAL HISTORY:  Basal cell carcinoma     GERD (gastroesophageal reflux disease)     History of 2019 novel coronavirus disease (COVID-19) 8/21/2022- s/p paxlovid - not hospitalized, home test only    HLD (hyperlipidemia) no medications    IBS (irritable bowel syndrome)     IFG (impaired fasting glucose)     OP (osteoporosis)     Reactive hypertension " White coat"

## 2022-10-19 NOTE — H&P PST ADULT - PROBLEM SELECTOR PLAN 1
Scheduled for left lower eyelid reconstruction after Moh's tentatively on 09/27/2021. Pre op instructions, famotidine given and explained. Pt verbalized understanding.  Pt instructed to contact surgeon's office regarding Covid test pre op. Pt verbalized understanding. Scheduled for left lumpectomy with magseed localization, left sentinel lymph node biopsy with blue dye with Dr Millan on 10/27/2022.  COVId-19 testing information provided. Pre op instructions given and patient verbalized understanding.  CBC, BMP, EKG and medical clearance pending.  NPO after midnight night before procedure.  May take famotidine with small sip of water AM of procedure.  TO stop all ASA, NSAIDs, vitamins and supplements 1 week prior to procedure.  Chlorhexidene wash given with instructions.

## 2022-10-19 NOTE — H&P PST ADULT - PROBLEM SELECTOR PROBLEM 1
Basal cell carcinoma (BCC) of skin of left lower eyelid including canthus Malignant neoplasm of unspecified site of unspecified female breast

## 2022-10-19 NOTE — H&P PST ADULT - NSICDXFAMILYHX_GEN_ALL_CORE_FT
FAMILY HISTORY:  Father  Still living? Unknown  FH: heart disease, Age at diagnosis: Age Unknown    Mother  Still living? Unknown  Family history of DVT, Age at diagnosis: Age Unknown  FH: breast cancer, Age at diagnosis: Age Unknown  FH: hypertension, Age at diagnosis: Age Unknown    Sibling  Still living? Unknown  Family history of diabetes mellitus (DM), Age at diagnosis: Age Unknown

## 2022-10-19 NOTE — H&P PST ADULT - NSICDXPASTSURGICALHX_GEN_ALL_CORE_FT
PAST SURGICAL HISTORY:  Cervical fusion syndrome     H/O benign breast biopsy     H/O cataract extraction, left     H/O ovarian cystectomy     History of Mohs micrographic surgery for skin cancer     History of tonsillectomy     S/P cataract surgery, right

## 2022-10-19 NOTE — H&P PST ADULT - HISTORY OF PRESENT ILLNESS
76 y/o female with PMH of HTN, HLD ( no medications), IBS and osteoporosis presents for [facility].  C/O abnormal routine mammogram resulting in additional work up including biopsy which was pos for left breast malignancy  Feeling well at PST today with no recent cough, fever or illness  Scheduled for left lumpectomy with magseed localization, left sentinel lymph node biopsy with blue dye with Dr Millan on 10/27/2022.  COVId-19 testing information provided

## 2022-10-19 NOTE — H&P PST ADULT - NSANTHOSAYNRD_GEN_A_CORE
neck 13 inches/No. STEVAN screening performed.  STOP BANG Legend: 0-2 = LOW Risk; 3-4 = INTERMEDIATE Risk; 5-8 = HIGH Risk

## 2022-10-20 ENCOUNTER — OUTPATIENT (OUTPATIENT)
Dept: OUTPATIENT SERVICES | Facility: HOSPITAL | Age: 77
LOS: 1 days | End: 2022-10-20
Payer: MEDICARE

## 2022-10-20 ENCOUNTER — APPOINTMENT (OUTPATIENT)
Dept: ULTRASOUND IMAGING | Facility: IMAGING CENTER | Age: 77
End: 2022-10-20

## 2022-10-20 ENCOUNTER — RESULT REVIEW (OUTPATIENT)
Age: 77
End: 2022-10-20

## 2022-10-20 DIAGNOSIS — Z85.828 PERSONAL HISTORY OF OTHER MALIGNANT NEOPLASM OF SKIN: Chronic | ICD-10-CM

## 2022-10-20 DIAGNOSIS — Z98.42 CATARACT EXTRACTION STATUS, LEFT EYE: Chronic | ICD-10-CM

## 2022-10-20 DIAGNOSIS — Z98.41 CATARACT EXTRACTION STATUS, RIGHT EYE: Chronic | ICD-10-CM

## 2022-10-20 DIAGNOSIS — Z90.89 ACQUIRED ABSENCE OF OTHER ORGANS: Chronic | ICD-10-CM

## 2022-10-20 DIAGNOSIS — Z00.8 ENCOUNTER FOR OTHER GENERAL EXAMINATION: ICD-10-CM

## 2022-10-20 DIAGNOSIS — Z98.890 OTHER SPECIFIED POSTPROCEDURAL STATES: Chronic | ICD-10-CM

## 2022-10-20 DIAGNOSIS — Q76.1 KLIPPEL-FEIL SYNDROME: Chronic | ICD-10-CM

## 2022-10-20 PROBLEM — M81.0 AGE-RELATED OSTEOPOROSIS WITHOUT CURRENT PATHOLOGICAL FRACTURE: Chronic | Status: ACTIVE | Noted: 2022-10-19

## 2022-10-20 PROBLEM — Z86.16 PERSONAL HISTORY OF COVID-19: Chronic | Status: ACTIVE | Noted: 2022-10-19

## 2022-10-20 PROBLEM — N39.41 URINARY INCONTINENCE, URGE: Status: RESOLVED | Noted: 2021-12-01 | Resolved: 2022-10-20

## 2022-10-20 PROBLEM — I10 ESSENTIAL (PRIMARY) HYPERTENSION: Chronic | Status: ACTIVE | Noted: 2019-03-14

## 2022-10-20 PROBLEM — Z87.898 HISTORY OF ABNORMAL MAMMOGRAM: Status: RESOLVED | Noted: 2022-08-19 | Resolved: 2022-10-20

## 2022-10-20 PROBLEM — E78.5 HYPERLIPIDEMIA, UNSPECIFIED: Chronic | Status: ACTIVE | Noted: 2019-03-14

## 2022-10-20 PROBLEM — U07.1 COVID-19: Status: RESOLVED | Noted: 2022-08-17 | Resolved: 2022-10-20

## 2022-10-20 PROBLEM — Z01.818 PRE-OP TESTING: Status: RESOLVED | Noted: 2021-05-25 | Resolved: 2022-10-20

## 2022-10-20 PROCEDURE — C1739: CPT

## 2022-10-20 PROCEDURE — 19285 PERQ DEV BREAST 1ST US IMAG: CPT | Mod: LT

## 2022-10-20 PROCEDURE — 19285 PERQ DEV BREAST 1ST US IMAG: CPT

## 2022-10-20 RX ORDER — NIRMATRELVIR AND RITONAVIR 300-100 MG
20 X 150 MG & KIT ORAL
Qty: 1 | Refills: 0 | Status: DISCONTINUED | COMMUNITY
Start: 2022-08-17 | End: 2022-10-20

## 2022-10-20 RX ORDER — DARIFENACIN HYDROBROMIDE 15 MG/1
15 TABLET, EXTENDED RELEASE ORAL
Qty: 30 | Refills: 0 | Status: DISCONTINUED | COMMUNITY
Start: 2022-05-20

## 2022-10-21 ENCOUNTER — APPOINTMENT (OUTPATIENT)
Dept: INTERNAL MEDICINE | Facility: CLINIC | Age: 77
End: 2022-10-21

## 2022-10-21 VITALS — DIASTOLIC BLOOD PRESSURE: 76 MMHG | SYSTOLIC BLOOD PRESSURE: 142 MMHG

## 2022-10-21 VITALS
OXYGEN SATURATION: 98 % | HEART RATE: 90 BPM | HEIGHT: 64 IN | BODY MASS INDEX: 22.53 KG/M2 | DIASTOLIC BLOOD PRESSURE: 60 MMHG | WEIGHT: 132 LBS | SYSTOLIC BLOOD PRESSURE: 148 MMHG

## 2022-10-21 DIAGNOSIS — U07.1 COVID-19: ICD-10-CM

## 2022-10-21 DIAGNOSIS — I10 ESSENTIAL (PRIMARY) HYPERTENSION: ICD-10-CM

## 2022-10-21 DIAGNOSIS — Z01.818 ENCOUNTER FOR OTHER PREPROCEDURAL EXAMINATION: ICD-10-CM

## 2022-10-21 DIAGNOSIS — N63.10 UNSPECIFIED LUMP IN THE RIGHT BREAST, UNSPECIFIED QUADRANT: ICD-10-CM

## 2022-10-21 DIAGNOSIS — Z87.898 PERSONAL HISTORY OF OTHER SPECIFIED CONDITIONS: ICD-10-CM

## 2022-10-21 DIAGNOSIS — N39.41 URGE INCONTINENCE: ICD-10-CM

## 2022-10-21 LAB
BUN SERPL-MCNC: 17
CALCIUM SERPL-MCNC: 9.5
CHLORIDE SERPL-SCNC: 99
CO2 SERPL-SCNC: 32
CREAT SERPL-MCNC: 0.76
GLUCOSE SERPL-MCNC: 104
HCT VFR BLD AUTO: 40.2
HGB BLD-MCNC: 13.6
PLATELET # BLD AUTO: 242
POTASSIUM SERPL-SCNC: 3.6
SODIUM SERPL-SCNC: 137
WBC # FLD AUTO: 6.6

## 2022-10-21 PROCEDURE — 99214 OFFICE O/P EST MOD 30 MIN: CPT

## 2022-10-21 NOTE — HISTORY OF PRESENT ILLNESS
[No Pertinent Cardiac History] : no history of aortic stenosis, atrial fibrillation, coronary artery disease, recent myocardial infarction, or implantable device/pacemaker [No Pertinent Pulmonary History] : no history of asthma, COPD, sleep apnea, or smoking [No Adverse Anesthesia Reaction] : no adverse anesthesia reaction in self or family member [(Patient denies any chest pain, claudication, dyspnea on exertion, orthopnea, palpitations or syncope)] : Patient denies any chest pain, claudication, dyspnea on exertion, orthopnea, palpitations or syncope [____ METs%] : [unfilled] METs% [Good (7-10 METs)] : Good (7-10 METs) [Aortic Stenosis] : no aortic stenosis [Atrial Fibrillation] : no atrial fibrillation [Coronary Artery Disease] : no coronary artery disease [Recent Myocardial Infarction] : no recent myocardial infarction [Implantable Device/Pacemaker] : no implantable device/pacemaker [Asthma] : no asthma [COPD] : no COPD [Sleep Apnea] : no sleep apnea [Smoker] : not a smoker [Family Member] : no family member with adverse anesthesia reaction/sudden death [Self] : no previous adverse anesthesia reaction [Chronic Anticoagulation] : no chronic anticoagulation [Chronic Kidney Disease] : no chronic kidney disease [Diabetes] : no diabetes [FreeTextEntry1] : Breast - left- Lumpectomy/[place] node bx [FreeTextEntry2] : 10/27/22 [FreeTextEntry4] : Pt with abnormal screening mammo- bx/us- 7mm lesions + for intraductal carcinoma- ER/VA positive and HER 2 negative\par \par NOw scheduled for surgery 10/27 [FreeTextEntry3] : Dr Fatmata Ramos

## 2022-10-21 NOTE — RESULTS/DATA
[] : results reviewed [de-identified] : wnl [de-identified] : BORDERLINE GLUCOSE  and  minor abnl of CO2 OF NO CLINICAL SIGNIFICANCE [de-identified] : wnl AND no change from previous- LAST 12/21

## 2022-10-21 NOTE — PHYSICAL EXAM
[Well Nourished] : well nourished [Well Developed] : well developed [Well-Appearing] : well-appearing [Normal Sclera/Conjunctiva] : normal sclera/conjunctiva [PERRL] : pupils equal round and reactive to light [EOMI] : extraocular movements intact [Normal Outer Ear/Nose] : the outer ears and nose were normal in appearance [Normal Oropharynx] : the oropharynx was normal [No JVD] : no jugular venous distention [No Lymphadenopathy] : no lymphadenopathy [Supple] : supple [Thyroid Normal, No Nodules] : the thyroid was normal and there were no nodules present [No Respiratory Distress] : no respiratory distress  [No Accessory Muscle Use] : no accessory muscle use [Clear to Auscultation] : lungs were clear to auscultation bilaterally [Normal Rate] : normal rate  [Regular Rhythm] : with a regular rhythm [Normal S1, S2] : normal S1 and S2 [No Murmur] : no murmur heard [No Carotid Bruits] : no carotid bruits [No Abdominal Bruit] : a ~M bruit was not heard ~T in the abdomen [Pedal Pulses Present] : the pedal pulses are present [No Edema] : there was no peripheral edema [No Palpable Aorta] : no palpable aorta [No Extremity Clubbing/Cyanosis] : no extremity clubbing/cyanosis [Soft] : abdomen soft [Non Tender] : non-tender [Non-distended] : non-distended [No Masses] : no abdominal mass palpated [No HSM] : no HSM [Normal Bowel Sounds] : normal bowel sounds [Normal Posterior Cervical Nodes] : no posterior cervical lymphadenopathy [Normal Anterior Cervical Nodes] : no anterior cervical lymphadenopathy [No CVA Tenderness] : no CVA  tenderness [No Spinal Tenderness] : no spinal tenderness [No Joint Swelling] : no joint swelling [Grossly Normal Strength/Tone] : grossly normal strength/tone [No Rash] : no rash [Coordination Grossly Intact] : coordination grossly intact [No Focal Deficits] : no focal deficits [Normal Gait] : normal gait [Deep Tendon Reflexes (DTR)] : deep tendon reflexes were 2+ and symmetric [Normal Affect] : the affect was normal [Normal Insight/Judgement] : insight and judgment were intact [de-identified] : somewhat anxious in discussing surgery, etc [de-identified] : LE varicosities

## 2022-10-21 NOTE — ASSESSMENT
[High Risk Surgery - Intraperitoneal, Intrathoracic or Supringuinal Vascular Procedures] : High Risk Surgery - Intraperitoneal, Intrathoracic or Supringuinal Vascular Procedures - No (0) [Ischemic Heart Disease] : Ischemic Heart Disease - No (0) [Congestive Heart Failure] : Congestive Heart Failure - No (0) [Prior Cerebrovascular Accident or TIA] : Prior Cerebrovascular Accident or TIA - No (0) [Creatinine >= 2mg/dL (1 Point)] : Creatinine >= 2mg/dL - No (0) [Insulin-dependent Diabetic (1 Point)] : Insulin-dependent Diabetic - No (0) [0] : 0 , RCRI Class: I, Risk of Post-Op Cardiac Complications: 3.9%, 95% CI for Risk Estimate: 2.8% - 5.4% [No Further Testing Recommended] : no further testing recommended [Patient Optimized for Surgery] : Patient optimized for surgery [Continue medications as is] : Continue current medications [FreeTextEntry7] : vitamins on hold

## 2022-10-25 LAB — SARS-COV-2 N GENE NPH QL NAA+PROBE: NOT DETECTED

## 2022-10-26 ENCOUNTER — TRANSCRIPTION ENCOUNTER (OUTPATIENT)
Age: 77
End: 2022-10-26

## 2022-10-27 ENCOUNTER — APPOINTMENT (OUTPATIENT)
Dept: SURGICAL ONCOLOGY | Facility: HOSPITAL | Age: 77
End: 2022-10-27

## 2022-10-27 ENCOUNTER — TRANSCRIPTION ENCOUNTER (OUTPATIENT)
Age: 77
End: 2022-10-27

## 2022-10-27 ENCOUNTER — APPOINTMENT (OUTPATIENT)
Dept: MAMMOGRAPHY | Facility: IMAGING CENTER | Age: 77
End: 2022-10-27

## 2022-10-27 ENCOUNTER — RESULT REVIEW (OUTPATIENT)
Age: 77
End: 2022-10-27

## 2022-10-27 ENCOUNTER — OUTPATIENT (OUTPATIENT)
Dept: OUTPATIENT SERVICES | Facility: HOSPITAL | Age: 77
LOS: 1 days | End: 2022-10-27
Payer: MEDICARE

## 2022-10-27 VITALS
HEART RATE: 82 BPM | OXYGEN SATURATION: 98 % | TEMPERATURE: 98 F | DIASTOLIC BLOOD PRESSURE: 74 MMHG | RESPIRATION RATE: 14 BRPM | WEIGHT: 129.19 LBS | SYSTOLIC BLOOD PRESSURE: 134 MMHG | HEIGHT: 66 IN

## 2022-10-27 VITALS
TEMPERATURE: 98 F | DIASTOLIC BLOOD PRESSURE: 67 MMHG | SYSTOLIC BLOOD PRESSURE: 119 MMHG | OXYGEN SATURATION: 98 % | RESPIRATION RATE: 14 BRPM | HEART RATE: 81 BPM

## 2022-10-27 DIAGNOSIS — Q76.1 KLIPPEL-FEIL SYNDROME: Chronic | ICD-10-CM

## 2022-10-27 DIAGNOSIS — Z98.41 CATARACT EXTRACTION STATUS, RIGHT EYE: Chronic | ICD-10-CM

## 2022-10-27 DIAGNOSIS — Z85.828 PERSONAL HISTORY OF OTHER MALIGNANT NEOPLASM OF SKIN: Chronic | ICD-10-CM

## 2022-10-27 DIAGNOSIS — C50.919 MALIGNANT NEOPLASM OF UNSPECIFIED SITE OF UNSPECIFIED FEMALE BREAST: ICD-10-CM

## 2022-10-27 DIAGNOSIS — Z90.89 ACQUIRED ABSENCE OF OTHER ORGANS: Chronic | ICD-10-CM

## 2022-10-27 DIAGNOSIS — Z98.890 OTHER SPECIFIED POSTPROCEDURAL STATES: Chronic | ICD-10-CM

## 2022-10-27 DIAGNOSIS — Z98.42 CATARACT EXTRACTION STATUS, LEFT EYE: Chronic | ICD-10-CM

## 2022-10-27 PROCEDURE — 19302 P-MASTECTOMY W/LN REMOVAL: CPT

## 2022-10-27 PROCEDURE — 38792 RA TRACER ID OF SENTINL NODE: CPT | Mod: 59

## 2022-10-27 PROCEDURE — 88307 TISSUE EXAM BY PATHOLOGIST: CPT

## 2022-10-27 PROCEDURE — A9541: CPT

## 2022-10-27 PROCEDURE — 88305 TISSUE EXAM BY PATHOLOGIST: CPT

## 2022-10-27 PROCEDURE — 88307 TISSUE EXAM BY PATHOLOGIST: CPT | Mod: 26

## 2022-10-27 PROCEDURE — 38525 BIOPSY/REMOVAL LYMPH NODES: CPT

## 2022-10-27 PROCEDURE — 76098 X-RAY EXAM SURGICAL SPECIMEN: CPT

## 2022-10-27 PROCEDURE — 76098 X-RAY EXAM SURGICAL SPECIMEN: CPT | Mod: 26

## 2022-10-27 PROCEDURE — 38900 IO MAP OF SENT LYMPH NODE: CPT

## 2022-10-27 PROCEDURE — 88305 TISSUE EXAM BY PATHOLOGIST: CPT | Mod: 26

## 2022-10-27 PROCEDURE — 19301 PARTIAL MASTECTOMY: CPT

## 2022-10-27 RX ORDER — SODIUM CHLORIDE 9 MG/ML
1000 INJECTION, SOLUTION INTRAVENOUS
Refills: 0 | Status: DISCONTINUED | OUTPATIENT
Start: 2022-10-27 | End: 2022-10-27

## 2022-10-27 RX ORDER — ACETAMINOPHEN 500 MG
650 TABLET ORAL EVERY 6 HOURS
Refills: 0 | Status: DISCONTINUED | OUTPATIENT
Start: 2022-10-27 | End: 2022-11-10

## 2022-10-27 RX ORDER — HYDROMORPHONE HYDROCHLORIDE 2 MG/ML
1 INJECTION INTRAMUSCULAR; INTRAVENOUS; SUBCUTANEOUS
Refills: 0 | Status: DISCONTINUED | OUTPATIENT
Start: 2022-10-27 | End: 2022-10-27

## 2022-10-27 RX ORDER — VIBEGRON 75 MG/1
1 TABLET, FILM COATED ORAL
Qty: 0 | Refills: 0 | DISCHARGE

## 2022-10-27 RX ORDER — FAMOTIDINE 10 MG/ML
1 INJECTION INTRAVENOUS
Qty: 0 | Refills: 0 | DISCHARGE

## 2022-10-27 RX ORDER — OMEPRAZOLE 10 MG/1
1 CAPSULE, DELAYED RELEASE ORAL
Qty: 0 | Refills: 0 | DISCHARGE

## 2022-10-27 RX ORDER — ACETAMINOPHEN 500 MG
2 TABLET ORAL
Qty: 0 | Refills: 0 | DISCHARGE

## 2022-10-27 RX ORDER — DIPHENHYDRAMINE HCL 50 MG
25 CAPSULE ORAL ONCE
Refills: 0 | Status: COMPLETED | OUTPATIENT
Start: 2022-10-27 | End: 2022-10-27

## 2022-10-27 RX ORDER — MULTIVIT-MIN/FERROUS GLUCONATE 9 MG/15 ML
1 LIQUID (ML) ORAL
Qty: 0 | Refills: 0 | DISCHARGE

## 2022-10-27 RX ORDER — CHLORTHALIDONE 50 MG
0.5 TABLET ORAL
Qty: 0 | Refills: 0 | DISCHARGE

## 2022-10-27 RX ORDER — ONDANSETRON 8 MG/1
4 TABLET, FILM COATED ORAL ONCE
Refills: 0 | Status: DISCONTINUED | OUTPATIENT
Start: 2022-10-27 | End: 2022-10-27

## 2022-10-27 RX ORDER — HYDROMORPHONE HYDROCHLORIDE 2 MG/ML
0.5 INJECTION INTRAMUSCULAR; INTRAVENOUS; SUBCUTANEOUS
Refills: 0 | Status: DISCONTINUED | OUTPATIENT
Start: 2022-10-27 | End: 2022-10-27

## 2022-10-27 RX ORDER — CHOLECALCIFEROL (VITAMIN D3) 125 MCG
1 CAPSULE ORAL
Qty: 0 | Refills: 0 | DISCHARGE

## 2022-10-27 RX ADMIN — Medication 25 MILLIGRAM(S): at 13:31

## 2022-10-27 RX ADMIN — SODIUM CHLORIDE 50 MILLILITER(S): 9 INJECTION, SOLUTION INTRAVENOUS at 08:56

## 2022-10-27 RX ADMIN — HYDROMORPHONE HYDROCHLORIDE 0.5 MILLIGRAM(S): 2 INJECTION INTRAMUSCULAR; INTRAVENOUS; SUBCUTANEOUS at 12:49

## 2022-10-27 NOTE — ASU DISCHARGE PLAN (ADULT/PEDIATRIC) - CARE PROVIDER_API CALL
Fatmata Millan)  Surgery  [address] [place] [id number]  Phone: [phone]  Fax: [phone]  Follow Up Time: 2 weeks

## 2022-10-27 NOTE — ASU DISCHARGE PLAN (ADULT/PEDIATRIC) - NS MD DC FALL RISK RISK
For information on Fall & Injury Prevention, visit: [email or web address] OR  [email or web address] OR  https://www.cdc.gov/steadi/patient.html

## 2022-10-27 NOTE — ASU PATIENT PROFILE, ADULT - FALL HARM RISK - UNIVERSAL INTERVENTIONS
Bed in lowest position, wheels locked, appropriate side rails in place/Call bell, personal items and telephone in reach/Instruct patient to call for assistance before getting out of bed or chair/Non-slip footwear when patient is out of bed/[place] to call system/Physically safe environment - no spills, clutter or unnecessary equipment/Purposeful Proactive Rounding/Room/bathroom lighting operational, light cord in reach

## 2022-11-03 LAB — SURGICAL PATHOLOGY STUDY: SIGNIFICANT CHANGE UP

## 2022-11-04 ENCOUNTER — NON-APPOINTMENT (OUTPATIENT)
Age: 77
End: 2022-11-04

## 2022-11-09 ENCOUNTER — APPOINTMENT (OUTPATIENT)
Dept: SURGICAL ONCOLOGY | Facility: CLINIC | Age: 77
End: 2022-11-09

## 2022-11-09 VITALS
BODY MASS INDEX: 22.2 KG/M2 | OXYGEN SATURATION: 97 % | HEART RATE: 76 BPM | HEIGHT: 64 IN | RESPIRATION RATE: 16 BRPM | DIASTOLIC BLOOD PRESSURE: 71 MMHG | SYSTOLIC BLOOD PRESSURE: 133 MMHG | WEIGHT: 130 LBS

## 2022-11-09 PROCEDURE — 99024 POSTOP FOLLOW-UP VISIT: CPT

## 2022-11-09 NOTE — HISTORY OF PRESENT ILLNESS
[FreeTextEntry1] : The patient is a 77 year F referred by Dr. Blank Ware for consultation regarding IDC s/p L lumpectomy and L SLNB on 10/27/2022\par \par She is accompanied by her  Vignesh.\par \par Prior History:\par The patient reports prior history of bilateral benign breast biopsies in 1991.  She reports routine mammography since then, with no suspicious findings until this year.  Of note, she did use Premarin vaginal cream starting July 2021 for bladder reasons, switched to Imvexxy, then stopped completely in 3/2022.\par \par Most recent imaging:\par 7/28/2022 B/L SM ([facility]) revealed heterogeneously dense breasts \par - B/L benign appearing calcs\par - L 12:00 mid/anterior depth asymmetry -> f/u additional imaging\par \par 7/28/2022 B/L US\par - R 4:00 4 mm cyst\par - R 10:00 5 mm benign intramammary LN\par - L 12:00 N1 5 x 4 x 6 mm hypoechoic mass, likely corresponds to above questioned asymmetry -> f/u targeted US\par - BR0\par \par 8/12/2022 L DM ([facility])\par - L 12:00 middle depth: persistent irregular mass. US correlate seen below.\par \par 8/12/2022 L US\par - L 12:00 N4 (previously 12:00 N1): 0.5 x 0.7 x 0.5 cm irregular hypoechoic mass correlates w/ DM-> L USG-CNB\par \par 8/24/2022 L USG-CNB ([facility])\par - L 12:00 N4 (cork): IDC, well differentiated w/ microcalcs. DCIS low-intermediate grade, cribriform, solid, micropapillary, nuclear grade 1-2, necrosis present, focal single necrosis, microcalcs; LCIS, classic type.\par -ER %, CA 71-80%, HER2 1+\par \par She denies any breast symptoms- no masses, skin changes, nipple discharge. \par \par Patient has a history of subclinical hypothyroidism, GERD, osteoporosis, HTN, hyperlipidemia\par Her medications include chlorthalidone, Pepcid, omeprazole, Gemtesa.\par Past surgical history include cataract surgery, ovarian cystectomy, tonsillectomy, arthrodesis, breast bx.\par Patient has a family of breast cancer in mother, age 90s. Denies other cancer history.\par \par 9/20/2022 B/L contrast enhanced mammo\par - L upper central bx marker at site of previously bx-proven focus of malignancy.\par - R upper posterior 0.4 x 0.5 circumscribed nodule, likely correspond with US showing normal-appearing 10:00 LN\par \par 9/20/2022 R US\par - R 10:00 N10 0.6 x 0.4 x 0.3 cm normal-appearing LN demonstrating flow to hilum\par - R 12:00 N3 0.6 x 0.5 x 0.3 cm hypoechoic mildly shadowing mass w/  angular margins-> USG-CNB \par \par 9/27/2022 R USG-CNB (NW)\par - R 12:00 N3 (heart):  benign tissue w/ stromal fibrosis, concordant \par \par 10/27/2022 L lumpectomy and L SLNB\par - L SLNB (0/2) negative for metastatic carcinoma\par - L breast no residual IDC; focal DCIS, nuclear grade1-2, micropapillary, 5 mm. LVI neg. DCIS 1.25 mm from nearest anterior margin. ALH. Bx site changes\par - L superior and lateral margin focal ALH\par - L inferior margin focal DCIS, 1 mm from designated margin; ALH\par - L anterior,  deep, medial margins negative\par - ER %, CA 71-80%, HER2 neg\par - AJCC pT1aN0, Stage 1A\par \par Interval History (postop):\par The patient reports that she had significant pain in her left axilla that lasted almost two weeks, but is now resolved. The breast was not very painful in comparison. She took extra-strength Tylenol briefly, but didn't feel that it was helpful. No fevers/chills.

## 2022-11-09 NOTE — PHYSICAL EXAM
[Normocephalic] : normocephalic [Atraumatic] : atraumatic [EOMI] : extra ocular movement intact [PERRL] : pupils equal, round and reactive to light [Sclera nonicteric] : sclera nonicteric [Bra Size: ___] : Bra Size: [unfilled] [No dominant masses] : no dominant masses in right breast  [No dominant masses] : no dominant masses left breast [No Nipple Retraction] : no left nipple retraction [No Nipple Discharge] : no left nipple discharge [No Edema] : no edema [No Rashes] : no rashes [No Ulceration] : no ulceration [de-identified] : non-labored respirations  [de-identified] : 12:00 incision c/d/i, no erythema, no fluctuance [de-identified] : incision c/d/i, no erythema, no fluctuance

## 2022-11-09 NOTE — CONSULT LETTER
[Dear  ___] : Dear  [unfilled], [Courtesy Letter:] : I had the pleasure of seeing your patient, [unfilled], in my office today. [Please see my note below.] : Please see my note below. [Consult Closing:] : Thank you very much for allowing me to participate in the care of this patient.  If you have any questions, please do not hesitate to contact me. [Sincerely,] : Sincerely, [FreeTextEntry3] : Fatmata Millan MD\par Breast Surgeon\par Division of Surgical Oncology\par Department of Surgery\par [address] [place] [address]\[place] [address] \par Tel: [phone]\par Fax: [phone]\par Email: [email or web address]

## 2022-11-09 NOTE — DATA REVIEWED
[FreeTextEntry1] : 9/20/2022 B/L contrast enhanced mammo and R US\par 9/27/2022 R USG-CNB \par 10/27/2022 L lumpectomy and L SLNB\par

## 2022-11-09 NOTE — ASSESSMENT
[FreeTextEntry1] : The patient is a 77 year F referred by Dr. Blank Ware for consultation regarding screening detected IDC, 7 mm on US, ER/MI positive, HER2 negative s/p L lumpectomy and L SLNB on 10/27/2022\par \par 10/27/2022 L lumpectomy and L SLNB\par - L SLNB (0/2) negative for metastatic carcinoma\par - L breast no residual IDC; focal DCIS, nuclear grade1-2, micropapillary, 5 mm. LVI neg. DCIS 1.25 mm from nearest anterior margin. ALH. Bx site changes\par - L superior and lateral margin focal ALH\par - L inferior margin focal DCIS, 1 mm from designated margin; ALH\par - L anterior,  deep, medial margins negative\par - ER %, MI 71-80%, HER2 neg\par - AJCC pT1aN0, Stage 1A\par  \par Exam today shows a well-healing incision without evidence for infection, hematoma, or seroma.\par \par Plan:\par - no oncotype sent for 4 mm IDC, T1a\par - refer to medical oncology and radiation oncology\par - next imaging 7/2023 B/L SM and US\par - RTO 3 months\par

## 2022-11-20 ENCOUNTER — NON-APPOINTMENT (OUTPATIENT)
Age: 77
End: 2022-11-20

## 2022-11-30 ENCOUNTER — OUTPATIENT (OUTPATIENT)
Dept: OUTPATIENT SERVICES | Facility: HOSPITAL | Age: 77
LOS: 1 days | Discharge: HOME | End: 2022-11-30

## 2022-11-30 DIAGNOSIS — C50.919 MALIGNANT NEOPLASM OF UNSPECIFIED SITE OF UNSPECIFIED FEMALE BREAST: ICD-10-CM

## 2022-11-30 DIAGNOSIS — Z85.828 PERSONAL HISTORY OF OTHER MALIGNANT NEOPLASM OF SKIN: Chronic | ICD-10-CM

## 2022-11-30 DIAGNOSIS — Z98.41 CATARACT EXTRACTION STATUS, RIGHT EYE: Chronic | ICD-10-CM

## 2022-11-30 DIAGNOSIS — Z90.89 ACQUIRED ABSENCE OF OTHER ORGANS: Chronic | ICD-10-CM

## 2022-11-30 DIAGNOSIS — Z98.890 OTHER SPECIFIED POSTPROCEDURAL STATES: Chronic | ICD-10-CM

## 2022-11-30 DIAGNOSIS — Z98.42 CATARACT EXTRACTION STATUS, LEFT EYE: Chronic | ICD-10-CM

## 2022-11-30 DIAGNOSIS — Q76.1 KLIPPEL-FEIL SYNDROME: Chronic | ICD-10-CM

## 2022-12-02 ENCOUNTER — NON-APPOINTMENT (OUTPATIENT)
Age: 77
End: 2022-12-02

## 2022-12-02 ENCOUNTER — APPOINTMENT (OUTPATIENT)
Dept: HEMATOLOGY ONCOLOGY | Facility: CLINIC | Age: 77
End: 2022-12-02

## 2022-12-02 ENCOUNTER — APPOINTMENT (OUTPATIENT)
Dept: RADIATION ONCOLOGY | Facility: CLINIC | Age: 77
End: 2022-12-02

## 2022-12-02 VITALS
SYSTOLIC BLOOD PRESSURE: 145 MMHG | WEIGHT: 132.94 LBS | DIASTOLIC BLOOD PRESSURE: 83 MMHG | BODY MASS INDEX: 22.7 KG/M2 | RESPIRATION RATE: 16 BRPM | HEART RATE: 84 BPM | HEIGHT: 64 IN | TEMPERATURE: 98.5 F | OXYGEN SATURATION: 99 %

## 2022-12-02 VITALS
RESPIRATION RATE: 16 BRPM | HEART RATE: 84 BPM | WEIGHT: 132.94 LBS | TEMPERATURE: 98.5 F | DIASTOLIC BLOOD PRESSURE: 83 MMHG | OXYGEN SATURATION: 99 % | BODY MASS INDEX: 22.15 KG/M2 | HEIGHT: 64.96 IN | SYSTOLIC BLOOD PRESSURE: 145 MMHG

## 2022-12-02 DIAGNOSIS — Z92.29 PERSONAL HISTORY OF OTHER DRUG THERAPY: ICD-10-CM

## 2022-12-02 PROCEDURE — 99204 OFFICE O/P NEW MOD 45 MIN: CPT | Mod: 25

## 2022-12-02 PROCEDURE — 99205 OFFICE O/P NEW HI 60 MIN: CPT

## 2022-12-02 RX ORDER — MECLIZINE HYDROCHLORIDE 12.5 MG/1
12.5 TABLET ORAL 3 TIMES DAILY
Qty: 30 | Refills: 1 | Status: DISCONTINUED | COMMUNITY
Start: 2022-11-02 | End: 2022-12-02

## 2022-12-02 NOTE — HISTORY OF PRESENT ILLNESS
[T: ___] : T[unfilled] [N: ___] : N[unfilled] [M: ___] : M[unfilled] [AJCC Stage: ____] : AJCC Stage: [unfilled] [de-identified] : Ms. Syeda Villareal is a 77 year old female here for an evaluation of breast cancer. Her oncologic history is as follows:\par \par She underwent routine breast imaging on 7/28/22(BIRADS 0) which showed heterogeneously dense breasts, B/L benign appearing calcs and a left 12:00 mid/anterior depth asymmetry for which additional imaging was rec. Same day US revealed a right  4:00 4 mm cyst, right 10:00 5 mm benign intramammary LN and  L 12:00 N1 5 x 4 x 6 mm hypoechoic mass, likely corresponds to above questioned asymmetry for which targeted US is rec. On 8/12/22 (BIRADS 4) she underwent left breast imaging which showed left breast 12:00 4cm 0.7cm suspicious mass on mammo and  L 12:00 N4  0.5 x 0.7 x 0.5 cm irregular hypoechoic mass correlates on sono for which  US guided core biopsy is rec. No left axillary lymphadenopathy.\par \par  She underwent left breast 12 o'clock 4cm FN core biopsy on 8/24/2022 which showed infiltrating duct carcinoma well differentiated w/ microcalcs  Rafael score 4/9. measuring 0.4 cm, ER+ %, AK+ 71-80%, HER2 negative.  Low-intermediate grade DCIS, cribriform, solid, micropapillary type, focal single necrosis and microcalcifications present, LCIS, classic type also noted.\par \par She underwent a breast enhanced mammo on 09/20/2022 (BI-RADS 6) which showed No radiographic evidence of malignancy in either breast separate from the site of the biopsy-proven malignancy in the upper central left breast There is a suspicious hypoechoic mass at the 12:00 right breast by sonography for which ultrasound-guided core biopsy is recommended.\par \par  On 9/27/2022 she underwent Right breast ultrasound guided core biopsy at 12 o'clock 3cmfn x 3 cores which revealed  Benign breast tissue with stromal fibrosis. These results are CONCORDANT\par \par She underwent left breast lumpectomy and SLNB on 10/27/22 which revealed  ER %, AK 71-80%, HER2 neg Invasive ductal carcinoma, Rafael grad 1, measuring 4 mm, margins were negative, no lymphovascular invasion seen.No residual Invasive ductal carcinoma, focal DCIS, nuclear grade1-2, micropapillary, measuring 5 mm. DCIS 1.25 mm from nearest anterior margin. ALH. Bx site changes\par \par She used HRT x 5 yrs, h/o vag pessary for short period of time\par

## 2022-12-02 NOTE — VITALS
[Maximal Pain Intensity: 0/10] : 0/10 [Least Pain Intensity: 0/10] : 0/10 [90: Able to carry normal activity; minor signs or symptoms of disease.] : 90: Able to carry normal activity; minor signs or symptoms of disease.  [ECOG Performance Status: 0 - Fully active, able to carry on all pre-disease performance without restriction] : Performance Status: 0 - Fully active, able to carry on all pre-disease performance without restriction [Date: ____________] : Patient's last distress assessment performed on [unfilled]. [8 - Distress Level] : Distress Level: 8 [Referred Patient  to social work for follow-up] : Patient was referred to social work for follow-up

## 2022-12-02 NOTE — PHYSICAL EXAM
[General Appearance - Well Developed] : well developed [Sclera] : the sclera and conjunctiva were normal [Outer Ear] : the ears and nose were normal in appearance [] : no respiratory distress [Breast Palpation Mass] : no palpable masses [No UE Edema] : there is no upper extremity edema [Normal] : oriented to person, place and time, the affect was normal, the mood was normal and not anxious [de-identified] : left breast scar healing well

## 2022-12-02 NOTE — PHYSICAL EXAM
[Fully active, able to carry on all pre-disease performance without restriction] : Status 0 - Fully active, able to carry on all pre-disease performance without restriction [Normal] : affect appropriate [de-identified] : healing lumpectomy and axillary scar

## 2022-12-02 NOTE — REASON FOR VISIT
[Initial Consultation] : an initial consultation [Family Member] : family member [FreeTextEntry2] : Invasive ductal carcinoma of breast of the left breast

## 2022-12-02 NOTE — CONSULT LETTER
[Dear  ___] : Dear  [unfilled], [Consult Letter:] : I had the pleasure of evaluating your patient, [unfilled]. [Please see my note below.] : Please see my note below. [Consult Closing:] : Thank you very much for allowing me to participate in the care of this patient.  If you have any questions, please do not hesitate to contact me. [Sincerely,] : Sincerely, [FreeTextEntry2] : Dr. Avilez Yana [FreeTextEntry3] : Eliza Kerns MD\par Attending Physician, Division of Medical Oncology and Hematology\par Medical Director, Center for Cancer, Pregnancy and Reproduction\par Medical Director, Breast Wellness and Cancer Prevention Program \par [facility]\par [facility]\par , [employer] [facility]

## 2022-12-02 NOTE — ASSESSMENT
[FreeTextEntry1] : In summary, Ms. CALI GHOSH is a 77 year old postmenopausal female with stage IA (T1a, N0, M0) ER positive, VA positive, HER-2/eve negative well differentiated invasive ductal carcinoma of the left breast. She is status post lumpectomy and is scheduled to meet with Dr Chun for radiation Oncology consultation. She is s/p lumpectomy on 10/27/22. She met with Dr Lynch. \par \par I discussed the treatment for stage I breast cancer with the patient including the role of chemotherapy, radiation therapy and endocrine therapy. Oncotype DX or chemotherapy is not indicated due to small tumor size. I recommend Arimidex 1 mg daily for five years. I discussed the risks and benefits of aromatase inhibitor therapy including fatigue, coronary artery disease, hyperlipidemia, vaginal dryness, mood changes, hot flashes, GI disturbances, arthralgias, myalgias, and osteoporosis. She will continue Reclast with endo. Her medical comorbidities are managed well. She will continue to follow with her PCP for general health maintenance including fasting lipid profile every year.  She will continue annual mammograms. I will see her every 3-6 months to monitor treatment side effects. \par \par She is referred to see medical genetics due to personal and family history.\par \par The patient had plenty of time to ask questions and all of her questions were answered to her satisfaction. I gave her my office phone number and encouraged her to call with any questions or additional information.\par \par

## 2022-12-02 NOTE — OB/GYN HISTORY
[History of Hormone Replacement Therapy] : a history of hormone replacement therapy [Currently In Menopause] : currently in menopause [___] : Total Pregnancies: [unfilled] [Experiencing Menopausal Sxs] : not experiencing menopausal symptoms

## 2022-12-02 NOTE — HISTORY OF PRESENT ILLNESS
[FreeTextEntry1] : Ms. Villareal is a 77 year old female who presents in consultation for consideration of radiation therapy.  She is accompanied by her  for today's visit.  \par \par Diagnosis:  pT1a pN0 (sn) LEFT Breast Invasive well differentiated Ductal Carcinoma (4.0 mm), Focal DCIS, nuclear grade 1-2 (5.0 mm), No residual invasive carcinoma seen in resection.  Resection margins negative for carcinoma.  DCIS is 1.25 mm from nearest margin (anterior). Atypical lobular hyperplasia.  Two sentinel lymph nodes negative for metastatic carcinoma.  (0/2)\par  ER + (%) HI + (71-80%) HER2 - \par \par **Personal history of benign breast biopsies in 1991.**\par \par HPI : \par \par 7/28/22 -  Bilateral Screening Mammogram/US:  There is no mammographic or sonographic evidence of malignancy in the right breast.  Mammographic and sonographic findings left 12:00 axis requiring additional evaluation, as detailed in full report.  BIRADS 0\par \par 8/12/22 - Callback Left Mammogram/US:  Left Breast 12:00, 4 cm from nipple, 0.7 cm mass is suspicious.  Recommend US guided core biopsy.  No left axillary lymphadenopathy.  Biopsy of the left breast is recommended.  BIRADS 4 \par \par 8/24/22 - underwent LEFT Breast 12:00, 4 cm FN Biopsy:  Pathology [facility] review -  Invasive well differentiated Ductal Carcinoma (4.0 mm), Rafael score 4/9, DCIS, cribriform with low to intermediate nuclear grade, also focal lobular carcinoma in situ, microcalcifications present.  Lymphovascular permeation by tumor not seen.    ER + (%) HI + (71-80%) HER2 - \par \par 9/7/22 - saw Dr. USHA Millan (surgeon) in consultation ... discussed treatment options and ordered MRI. \par \par 9/20/22 - Bilateral Screening Mammogram/US:  1. Dense breasts.  2. No radiographic evidence of malignancy in either breast separate from the site of the biopsy-proven malignancy in the upper central left breast.  3. Suspicious hypoechoic mass at the 12:00 right breast by sonography for which ultrasound-guided core biopsy is recommended.  BIRADS 6\par \par 9/27/22 - underwent RIGHT Breast 12:00, 3 cm FN Biopsy:  Pathology - Benign breast tissue with stromal fibrosis.  \par \par 10/27/22 - underwent LEFT Lumpectomy with SLN Biopsy with Dr. USHA Millan (surgeon):  Pathology -  \par 1 - Provencal node #1, LEFT Axilla:  One lymph node negative for metastatic carcinoma (0/1)\par 2 - Provencal node #2, LEFT Axilla:  One lymph node negative for metastatic carcinoma (0/1)\par 3 - LEFT Breast Lumpectomy:  No residual Invasive carcinoma identified.  Focal DCIS, nuclear grade 1-2, micropapillary type.  Largest focus of DCIS measuring 5.0 mm in greatest dimension.  No lymphovascular invasion identified.  Resection margins are negative for carcinoma.  DCIS is 1.25 mm from nearest margin (anterior), refer to separate marginal biopsies for final margin status correlation.  Atypical lobular hyperplasia.  Fibrocystic changes and benign epithelial calcifications.  Biopsy site changes.\par 4 - LEFT Breast, superior margin biopsy:  Benign breast tissue with focal atypical lobular hyperplasia.\par 5 - LEFT Breast, medial margin biopsy:  Benign breast tissue.\par 6 - LEFT Breast, inferior margin biopsy:  Focal DCIS present, 1.0 mm from the designated margin. Atypical lobular hyperplasia.\par 7 - LEFT Breast, lateral margin biopsy:  Benign breast tissue with focal atypical lobular hyperplasia.\par 8 - LEFT Breast, deep margin biopsy:  Benign fibroadipose tissue.\par 9 - LEFT Breast, anterior margin biopsy:  Benign fibroadipose tissue.  \par \par 11/9/22 - saw Dr. Millan (surgeon) in follow up ... healing well from surgery.  No Oncoytype DX sent for 4 mm IDC, T1a.  Referrals for medical and radiation oncology made.\par \par 12/2/22 - presents in consultation for discussion of radiation therapy options.  Ms. Villareal is feeling well and has recovered well from her surgery.  Denies any pain.  She is appropriately anxious about next steps in her treatment. \par To see Dr. MARYCARMEN Kerns ([facility]) in consultation today as well.  \par

## 2022-12-06 ENCOUNTER — FORM ENCOUNTER (OUTPATIENT)
Age: 77
End: 2022-12-06

## 2022-12-06 ENCOUNTER — OUTPATIENT (OUTPATIENT)
Dept: OUTPATIENT SERVICES | Facility: HOSPITAL | Age: 77
LOS: 1 days | Discharge: HOME | End: 2022-12-06

## 2022-12-06 DIAGNOSIS — Z98.41 CATARACT EXTRACTION STATUS, RIGHT EYE: Chronic | ICD-10-CM

## 2022-12-06 DIAGNOSIS — Z98.890 OTHER SPECIFIED POSTPROCEDURAL STATES: Chronic | ICD-10-CM

## 2022-12-06 DIAGNOSIS — Z15.89 GENETIC SUSCEPTIBILITY TO OTHER DISEASE: ICD-10-CM

## 2022-12-06 DIAGNOSIS — Z98.42 CATARACT EXTRACTION STATUS, LEFT EYE: Chronic | ICD-10-CM

## 2022-12-06 DIAGNOSIS — Z90.89 ACQUIRED ABSENCE OF OTHER ORGANS: Chronic | ICD-10-CM

## 2022-12-06 DIAGNOSIS — Q76.1 KLIPPEL-FEIL SYNDROME: Chronic | ICD-10-CM

## 2022-12-06 DIAGNOSIS — C50.919 MALIGNANT NEOPLASM OF UNSPECIFIED SITE OF UNSPECIFIED FEMALE BREAST: ICD-10-CM

## 2022-12-06 DIAGNOSIS — Z85.828 PERSONAL HISTORY OF OTHER MALIGNANT NEOPLASM OF SKIN: Chronic | ICD-10-CM

## 2022-12-07 ENCOUNTER — APPOINTMENT (OUTPATIENT)
Dept: INTERNAL MEDICINE | Facility: CLINIC | Age: 77
End: 2022-12-07

## 2022-12-07 ENCOUNTER — APPOINTMENT (OUTPATIENT)
Dept: HEMATOLOGY ONCOLOGY | Facility: CLINIC | Age: 77
End: 2022-12-07

## 2022-12-07 VITALS — HEIGHT: 64 IN | WEIGHT: 131 LBS | HEART RATE: 109 BPM | OXYGEN SATURATION: 98 % | BODY MASS INDEX: 22.36 KG/M2

## 2022-12-07 VITALS — HEART RATE: 80 BPM | DIASTOLIC BLOOD PRESSURE: 68 MMHG | RESPIRATION RATE: 14 BRPM | SYSTOLIC BLOOD PRESSURE: 124 MMHG

## 2022-12-07 DIAGNOSIS — Z01.818 ENCOUNTER FOR OTHER PREPROCEDURAL EXAMINATION: ICD-10-CM

## 2022-12-07 PROCEDURE — G0439: CPT

## 2022-12-07 PROCEDURE — 99214 OFFICE O/P EST MOD 30 MIN: CPT | Mod: 25

## 2022-12-07 PROCEDURE — G0444 DEPRESSION SCREEN ANNUAL: CPT | Mod: 59

## 2022-12-07 NOTE — HEALTH RISK ASSESSMENT
[Very Good] : ~his/her~  mood as very good [Yes] : Yes [4 or more  times a week (4 pts)] : 4 or more  times a week (4 points) [1 or 2 (0 pts)] : 1 or 2 (0 points) [Never (0 pts)] : Never (0 points) [No] : In the past 12 months have you used drugs other than those required for medical reasons? No [No falls in past year] : Patient reported no falls in the past year [0] : 2) Feeling down, depressed, or hopeless: Not at all (0) [PHQ-2 Negative - No further assessment needed] : PHQ-2 Negative - No further assessment needed [HIV test declined] : HIV test declined [Hepatitis C test declined] : Hepatitis C test declined [None] : None [Alone] : lives alone [Retired] : retired [College] : College [] :  [# Of Children ___] : has [unfilled] children [Feels Safe at Home] : Feels safe at home [Fully functional (bathing, dressing, toileting, transferring, walking, feeding)] : Fully functional (bathing, dressing, toileting, transferring, walking, feeding) [Fully functional (using the telephone, shopping, preparing meals, housekeeping, doing laundry, using] : Fully functional and needs no help or supervision to perform IADLs (using the telephone, shopping, preparing meals, housekeeping, doing laundry, using transportation, managing medications and managing finances) [Reports normal functional visual acuity (ie: able to read med bottle)] : Reports normal functional visual acuity [Seat Belt] :  uses seat belt [With Patient/Caregiver] : , with patient/caregiver [Reviewed no changes] : Reviewed, no changes [Designated Healthcare Proxy] : Designated healthcare proxy [Name: ___] : Health Care Proxy's Name: [unfilled]  [I will adhere to the patient's wishes.] : I will adhere to the patient's wishes. [Never] : Never [FreeTextEntry1] : Trying to decide on medication/RT [de-identified] : Amb Surg for lumpectomy 10/22 [de-identified] : Heme Onc- Rad Onc [Audit-CScore] : 4 [de-identified] : watching grandchildren- active [de-identified] : Eats all foods- low fat- a lot of fruits and vegetables [de-identified] : 10/20- tripped over something left on floor by grandchild- no significant injury [[facility]] : 8 [PKH6Tfbox] : 0 [Change in mental status noted] : No change in mental status noted [Language] : denies difficulty with language [Behavior] : denies difficulty with behavior [Learning/Retaining New Information] : denies difficulty learning/retaining new information [Handling Complex Tasks] : denies difficulty handling complex tasks [Reasoning] : denies difficulty with reasoning [Spatial Ability and Orientation] : denies difficulty with spatial ability and orientation [Sexually Active] : not sexually active [Reports changes in hearing] : Reports no changes in hearing [Reports changes in vision] : Reports no changes in vision [Reports changes in dental health] : Reports no changes in dental health [Smoke Detector] : no smoke detector [de-identified] : 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

## 2022-12-07 NOTE — HISTORY OF PRESENT ILLNESS
[de-identified] : Last seen in Oct- preop for lumpectomy- with contact since. \par \par Patient has been generally well. Adherent with medications as noted without side effects. Appetite good and weight reportedly stable. Active with good exercise tolerance. No sx of chest pain, sob, palpitations, orthopnea, PND, HECTOR, edema, lightheadedness..\par \par Had lumpectomy and sentinel node in Oct- results were good- sentinel node was neg- tumor was small and with favorable hormone\par \par Had consultation with Rad Onc- and Med Onc- see notes- Arimidex 1mg for 5 years advised- pt considering taking vs not as low risk

## 2022-12-07 NOTE — PHYSICAL EXAM
[Well Nourished] : well nourished [Well Developed] : well developed [Well-Appearing] : well-appearing [Normal Sclera/Conjunctiva] : normal sclera/conjunctiva [PERRL] : pupils equal round and reactive to light [EOMI] : extraocular movements intact [Normal Outer Ear/Nose] : the outer ears and nose were normal in appearance [Normal Oropharynx] : the oropharynx was normal [No JVD] : no jugular venous distention [No Lymphadenopathy] : no lymphadenopathy [Supple] : supple [Thyroid Normal, No Nodules] : the thyroid was normal and there were no nodules present [No Respiratory Distress] : no respiratory distress  [No Accessory Muscle Use] : no accessory muscle use [Clear to Auscultation] : lungs were clear to auscultation bilaterally [Normal Rate] : normal rate  [Regular Rhythm] : with a regular rhythm [Normal S1, S2] : normal S1 and S2 [No Murmur] : no murmur heard [No Carotid Bruits] : no carotid bruits [No Abdominal Bruit] : a ~M bruit was not heard ~T in the abdomen [Pedal Pulses Present] : the pedal pulses are present [No Edema] : there was no peripheral edema [No Palpable Aorta] : no palpable aorta [No Extremity Clubbing/Cyanosis] : no extremity clubbing/cyanosis [Soft] : abdomen soft [Non Tender] : non-tender [Non-distended] : non-distended [No Masses] : no abdominal mass palpated [No HSM] : no HSM [Normal Bowel Sounds] : normal bowel sounds [Normal Posterior Cervical Nodes] : no posterior cervical lymphadenopathy [Normal Anterior Cervical Nodes] : no anterior cervical lymphadenopathy [No CVA Tenderness] : no CVA  tenderness [No Spinal Tenderness] : no spinal tenderness [No Joint Swelling] : no joint swelling [Grossly Normal Strength/Tone] : grossly normal strength/tone [No Rash] : no rash [Coordination Grossly Intact] : coordination grossly intact [No Focal Deficits] : no focal deficits [Normal Gait] : normal gait [Deep Tendon Reflexes (DTR)] : deep tendon reflexes were 2+ and symmetric [Normal Affect] : the affect was normal [Normal Insight/Judgement] : insight and judgment were intact [de-identified] : LE varicosities [de-identified] : scar around left areolar- site of lumpectomy incision

## 2022-12-07 NOTE — ASSESSMENT
[FreeTextEntry1] : 1. HCM--immun- ok,- flu shot this season- covid + booster\par  last colon 10/16 and neg- f/u 10 yrs\par  mammo July 2022 neg- f/u one hear\par  gyn- July 2021 Dr.Steven Babcock- issue of terminating screen\par  normal ecg in 10/22\par  Hep C screening neg 9/12\par  TSH 11/18 last- borderline high with normal free t3/t4- monitor expectantly- asx\par  Depression screening- negative\par  HIV screening offered and deferred\par \par  2. Hyperlipidemia- diet controlled- last - calculated CV Pooled Risk Cohort greater than 5-7.5. However had cardiac ct in June 2014 with clean coronaries- pt against adding a statin and as no cad and as over aged 65,hard to argue that needs medication\par \par  3. Breast Cancer- left sided detected by abnormal mammo - s/p lumpectomy and sentinel node witih favorable size and hormone profile- did consult Rad Onc- leaning towards no RT- understands role to reduce chance of recurrent disease locally. Advised arimidex by Med -Onc, but still considering. Advised will review data with her at another time- would not start until the New Year- next mammo next summer\par  \par  4.GI- IBS- colitis 2012- presumed infectious- treated- resolved. CT abd- neg. EGD in 9/12- inflammation- EGD and colon 10/16- essentially neg- see reports. Nutritionist support in past\par  \par  5. Osteoporosis- on fosamax from 1/07 and improved DEXA 2009. Off fosamax since 4/10 because of GERD sx- Boniva started 8/10 with the intention to treat for at least 5 years total of bisphosphonates.. Off Boniva in June 2012 with improved GI sx\par  \par Essentially had 5 years of treatment- d/c'd in June 2012 after DEXA results- continued drug holiday and repeated DEXA in June 2014 - basically stable- Repeat DEXA 6/16- worsening T scores in fem neck with T -2.4- unable to resume bisphosphonate for GI issues- Saw Dr Sierra in consultation July 2017 and had DEXA repeated- advised continued drug holiday and repeat testing in one year. Seen again 7/18 with BMD indicating slight decrease in density in fem neck- advised continued drug holiday- Then Actonel restarted July 2019- see notes- f/u 11/19 an dc'd med due to GI side effects- had one dose IV reclast 11/19- and second dose in Aug 2021- next planned 2/23\par  \par  6. remote h/o melanoma 1991- and basal cell- sees derm Dr. Frost--last appt July 2022 goes every 6 mos-\par  \par  7. Hypertension with reactive component- BP ok\par \par \par Recent ECG and labs for surgery- see no need to repeat. Will f/u re further discussion of pros/cons of aromatase inhib\par \par \par . \par

## 2022-12-09 NOTE — ASU PATIENT PROFILE, ADULT - PT NEEDS ASSIST
no Pt comes in complaining of bruising in BLE's s/t itching. Pt states she doesn't have any specific reason why she has been so itchy aside from recent new job grooming dogs. Pt also admits to PMH of eczema. Bruising noted on LE's.

## 2022-12-13 ENCOUNTER — NON-APPOINTMENT (OUTPATIENT)
Age: 77
End: 2022-12-13

## 2022-12-23 ENCOUNTER — NON-APPOINTMENT (OUTPATIENT)
Age: 77
End: 2022-12-23

## 2022-12-23 NOTE — DISCUSSION/SUMMARY
[FreeTextEntry1] : Ms. Villareal was called on 12/23/2022 and informed that the saliva sample provided failed [facility]'s quality metrics. We discussed that she may have a second saliva kit sent to her home, or she may come to one of our cancer centers for a blood draw. Patient chose to have another saliva kit sent to her home as she will not be available to come on site for a blood draw at this time. Liseth was messaged today, and a new kit will be sent to her home.

## 2023-01-01 ENCOUNTER — RX RENEWAL (OUTPATIENT)
Age: 78
End: 2023-01-01

## 2023-01-06 NOTE — DISCUSSION/SUMMARY
[FreeTextEntry1] : REASON FOR CONSULT\par Syeda Villareal is a 77-year-old female who was referred by Dr. Eliza Kerns for cancer genetic counseling and risk assessment due to her personal and family history of cancer.\par \par RELEVANT MEDICAL HISTORY\par Ms. Villareal was diagnosed with L-breast invasive ductal carcinoma with DCIS in  at age 77.  She is s/p lumpectomy on 10/27/22 and is considering the role of radiation in her care.  She is also considering the use of Arimidex.\par \par She has reported a personal history of skin cancer (melanoma, squamous cell, basal cell) and underwent Mohs procedures for all of them.\par \par Ms. Villareal also has a family history of cancer, see below.\par \par OTHER MEDICAL AND SURGICAL HISTORY:\par Medical: “reactive” blood pressure, GERD, HLD, osteoporosis (with infusion), IBS\par Surgical: partial R-oophorectomy at 40 due to dermoid cyst, Arthrodesis Cervical, Cataracts, Chemosurgery (Mohs), Tonsillectomy\par \par PAST OB/GYN HISTORY:\par Obstetrical History: \par Age at Menarche: 15\par Menopausal with LMP at age 49 \par Age at First Live Birth: 41\par Oral Contraceptive Use: No\par Hormone Replacement Therapy: Patch at start of menopause for less than 1 year due to hot flashes. Vaginal estrogen, used for 1-2 years total. Last Used 2 years ago (suppository)\par \par CANCER SCREENING HISTORY:  \par Breast: \par •	Mammo/sono: 2022 bilateral, Results: BIRADS 0, asymmetry/hypoechoic mass in L-breast\par •	Mammo/sono: 2022 L-breast, Results: BIRADS 4 suspicious, biopsy recommended\par •	Most recent sono: ; Results: ; Frequency:\par •	No prior breast MRI (has wire in neck, unable to perform per patient)\par •	Breast Biopsies: 4 total; 1) benign 1991, 2) LCIS, 3) R-breast (2022), Result: benign breast tissue with stromal fibrosis, 4) with diagnosis L-breast (2022) Result: IDC, DCIS ER pos, VA pos, HER2 neg\par GYN:\par •	Most recent GYN visit: 2022; Frequency: yearly\par •	Most recent pap smear: unkn; Results: previously normal per patient\par •	Most recent transvaginal ultrasound: 2022; Results: reported normal\par Colon:\par •	Most recent colonoscopy: 10/18/2016; Results: normal colon, internal hemorrhoids; Frequency: 10 years\par •	Total Polyps: less than 5 benign total\par Skin:  \par •	Most recent skin exam: 2022; Results: No lesions removed; Frequency: every 1 year but more often as needed\par Other:\par •	Most recent endoscopy: 2021; Results: gastritis, 4mm to 8mm polyps in the stomach body (biopsy showed fundic gland polyps), normal mucosa in esophagus, normal duodenal bulb and second portion of the duodenum; Frequency:\par •	 endoscopy showed “few small smooth sessile polyps were found in the gastric body”, biopsy showed hyperplastic polyp\par •	Likely due to omeprazole per pt\par \par SOCIAL HISTORY:\par •	\par •	Tobacco-product use: No\par •	Environmental exposure: Exposure to [employer] machines in schools \par \par FAMILY HISTORY:\par Maternal ancestry was reported as [place]ym[place] and Romanian (Ashkenazi Evangelical), and paternal ancestry was reported as Polish. Consanguinity was denied. A detailed family history of cancer was ascertained, see below and scanned chart for pedigree. \par \par To Ms. Villareal’s knowledge, no one in the family has had germline testing for cancer susceptibility.  \par \par 	RISK ASSESSMENT:\par Ms. Villareal’s personal history of breast cancer and her family history of breast cancer in her mother (dx s) in the setting of Ashkenazi Evangelical maternal ancestry is suggestive of a hereditary cancer predisposition syndrome. We also discussed that approximately 1 in 40 individuals with Ashkenazi Evangelical ancestry carry a mutation in the BRCA1/2 genes. The patient meets National Comprehensive Cancer Network (NCCN) criteria for testing and fulfills the Medicare BRCA1/2 covered Indications for Testing. We recommended genetic testing for a breast cancer guidelines-based panel. We also offered to include genes related to melanoma and thyroid cancer given her additional personal and family history, however, Ms. Villareal declined at this time, opting to proceed with testing genes related to breast cancer only. This test analyzes 11 genes: WHITNEY, BARD1, BRCA1, BRCA2, CDH1, CHEK2, NF1, PALB2, PTEN, STK11, TP53.\par \par The risks, benefits and limitations of genetic testing were discussed with Ms. Villareal. In addition, we discussed the purpose of genetic testing and possible test results (positive, negative, inconclusive) along with associated medical management options and psychosocial implications. Insurance coverage and potential out of pocket costs were also discussed. The Genetic Information Non-discrimination Act (SAUL) was also reviewed, and Ms. Villareal had no concerns at this time.\par \par Ms. Villareal consented to the above-mentioned genetic testing panel. We provided her with a saliva sample kit to complete at home as per her request, opting out of a blood draw at this time.\par \par PLAN:\par \par 1.	Ms. Villareal will complete the saliva kit at home that we provided. \par 2.	We will contact Ms. Villareal to schedule a follow-up appointment once the results are available. Results generally return in 2-3 weeks. \par \par \par \par For any additional questions please call [facility] at [phone]. \par \par \par Laura García, MS\par Genetic Counselor, [facility]\par \par \par CC: Patient\par Dr. Eliza Kerns

## 2023-01-12 ENCOUNTER — APPOINTMENT (OUTPATIENT)
Dept: HEMATOLOGY ONCOLOGY | Facility: CLINIC | Age: 78
End: 2023-01-12

## 2023-01-12 NOTE — DISCUSSION/SUMMARY
[FreeTextEntry1] : Ms. Villareal returned to the [facility] on 1/12/2023 and had her blood drawn today for the cancer genetic testing previously discussed (see Oncology Genetic Counseling note from 12/07/2022). The saliva sample originally submitted failed [employer]’s quality metrics. Ms. Villareal was informed that I will reach out to her once the results are available.\par \par Laura García MS\[facility] Genetic Counselor

## 2023-01-19 ENCOUNTER — NON-APPOINTMENT (OUTPATIENT)
Age: 78
End: 2023-01-19

## 2023-01-19 NOTE — DISCUSSION/SUMMARY
[FreeTextEntry1] : Ms. Villareal was called on 1/19/2023 and informed of the results of her NEGATIVE 11-gene breast cancer panel ordered through [facility]. We discussed the option to pursue reflex testing including a Thyroid Cancer Panel and a Melanoma Panel given her additional personal and family history. The patient consented to proceeding with this additional testing. Ms. Villareal was informed I would call her with those additional results once they are returned.

## 2023-01-27 ENCOUNTER — NON-APPOINTMENT (OUTPATIENT)
Age: 78
End: 2023-01-27

## 2023-01-27 NOTE — DISCUSSION/SUMMARY
[FreeTextEntry1] : REASON FOR CONSULT\par Syeda Villareal is a 77-year-old female who was called on 1/27/2023 for a discussion regarding her variant of uncertain significance (VUS) genetic testing results related to hereditary cancer predisposition. \par \par Ms. Villareal was originally seen at [facility] on 12/7/2022 for hereditary cancer predisposition risk assessment. Ms. Villareal was diagnosed with L-breast invasive ductal carcinoma with DCIS in 2022 at age 77. Ms. Villareal decided to pursue genetic testing using the Breast Cancer Guidelines-Based Panel offered by [facility]\par \par Upon completion of this panel, Ms. Villareal was re-contacted on 01/19/2022 with her negative results and consented to pursue additional genetic testing for genes related to melanoma and thyroid cancer given her additional personal and family history. In total, Ms. 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 Individuals with a pathogenic mutation in DICER1 may have an increased risk for pleuropulmonary blastoma, thyroid gland neoplasia, ovarian tumors and cystic nephroma. It is unknown if the patient has an increased risk for the cancers associated with DICER1 at this time. \par \par In addition, [facility] is offering complimentary family studies as part of their VUS Resolution Program to individuals that are likely to provide additional evidence for future variant reclassifications. We discussed her nephew may consider participating in this program given his history of thyroid cancer. If relatives are interested in testing, we can help coordinate participation. \par \par The detection of this VUS should NOT currently change the patient’s medical management. It is NOT recommended at this time that family members use this result for predictive genetic testing or medical management decisions. With more research, a VUS may be reclassified as either disease-causing or benign. Ms. Villareal was encouraged to contact us every 2-3 years to enquire about any new information for this variant, or sooner if there are any changes in her personal or family history of cancer.  Such updates could possibly change our risk assessment and recommendations.\par \par IMPLICATIONS FOR THE PATIENT AND RELATIVES\par Given Ms. Villareal’s personal and current reported family history of cancer, and her negative genetic test results, the following screening guidelines and risk-reducing recommendations were discussed:\par \par BREAST: Long-term management and surveillance should be based on Ms. Villareal’s on- or post-treatment protocol as recommended by her oncologist \par \par OTHER: In the absence of other indications, Ms. Villareal should practice age-appropriate cancer screening of other organ systems as recommended for the general population.\par \par In addition, we discussed Ms. Villareal’s nephew may consider pursuing cancer risk assessment genetic counseling with the option of genetic testing given his personal history of thyroid cancer in his mid 30s. Also, her siblings may consider pursuing cancer risk assessment genetic counseling with the option of genetic testing given their mother’s history of breast cancer and maternal Ashkenazi Episcopal ancestry. We emphasized the importance of re-contacting us with updates regarding additional cancer genetic test results performed for the patient and/or family members. Such updates could possibly change our risk assessment and recommendations. \par \par PLAN:\par 1.	These results do not change Ms. Villareal’s medical management. See above discussion.\par 2.	Testing of family members based on this result is not indicated. \par 3.	The patient was encouraged to contact us every 2-3 years to check on any changes in interpretation of the VUS, or sooner if there are changes in her personal or family history of cancer.\par 4.	Patient informed consult note(s) will be available through their [facility] patient portal and genetic test results will be released via [facility]’s laboratory portal. \par \par \par For any additional questions please call [facility] at [phone]. \par \par \par Laura García, MS\par Genetic Counselor, [facility]\par \par \par CC: \par Patient\par Dr. Eliza Kerns

## 2023-02-08 ENCOUNTER — APPOINTMENT (OUTPATIENT)
Dept: SURGICAL ONCOLOGY | Facility: CLINIC | Age: 78
End: 2023-02-08
Payer: MEDICARE

## 2023-02-08 VITALS
RESPIRATION RATE: 98 BRPM | HEART RATE: 89 BPM | HEIGHT: 64 IN | SYSTOLIC BLOOD PRESSURE: 151 MMHG | BODY MASS INDEX: 22.2 KG/M2 | WEIGHT: 130 LBS | DIASTOLIC BLOOD PRESSURE: 73 MMHG

## 2023-02-08 PROCEDURE — 99213 OFFICE O/P EST LOW 20 MIN: CPT

## 2023-02-08 NOTE — CONSULT LETTER
[Dear  ___] : Dear  [unfilled], [Courtesy Letter:] : I had the pleasure of seeing your patient, [unfilled], in my office today. [Please see my note below.] : Please see my note below. [Consult Closing:] : Thank you very much for allowing me to participate in the care of this patient.  If you have any questions, please do not hesitate to contact me. [Sincerely,] : Sincerely, [FreeTextEntry3] : Fatmata Millan MD\par Breast Surgeon\par Division of Surgical Oncology\par Department of Surgery\par [address] [place] [address]\[place] [address] \par Tel: [phone]\par Fax: [phone]\par Email: [email or web address]

## 2023-02-08 NOTE — HISTORY OF PRESENT ILLNESS
[FreeTextEntry1] : The patient is a 77 year F referred by Dr. Blank Ware for consultation regarding IDC s/p L lumpectomy and L SLNB on 10/27/2022\par \par She is not accompanied by her  Vignesh.\par \par Prior History:\par The patient reports prior history of bilateral benign breast biopsies in 1991.  She reports routine mammography since then, with no suspicious findings until this year.  Of note, she did use Premarin vaginal cream starting July 2021 for bladder reasons, switched to Imvexxy, then stopped completely in 3/2022.\par \par Most recent imaging:\par 7/28/2022 B/L SM ([facility]) revealed heterogeneously dense breasts \par - B/L benign appearing calcs\par - L 12:00 mid/anterior depth asymmetry -> f/u additional imaging\par \par 7/28/2022 B/L US\par - R 4:00 4 mm cyst\par - R 10:00 5 mm benign intramammary LN\par - L 12:00 N1 5 x 4 x 6 mm hypoechoic mass, likely corresponds to above questioned asymmetry -> f/u targeted US\par - BR0\par \par 8/12/2022 L DM ([facility])\par - L 12:00 middle depth: persistent irregular mass. US correlate seen below.\par \par 8/12/2022 L US\par - L 12:00 N4 (previously 12:00 N1): 0.5 x 0.7 x 0.5 cm irregular hypoechoic mass correlates w/ DM-> L USG-CNB\par \par 8/24/2022 L USG-CNB ([facility])\par - L 12:00 N4 (cork): IDC, well differentiated w/ microcalcs. DCIS low-intermediate grade, cribriform, solid, micropapillary, nuclear grade 1-2, necrosis present, focal single necrosis, microcalcs; LCIS, classic type.\par -ER %, MA 71-80%, HER2 1+\par \par She denies any breast symptoms- no masses, skin changes, nipple discharge. \par \par Patient has a history of subclinical hypothyroidism, GERD, osteoporosis, HTN, hyperlipidemia\par Her medications include chlorthalidone, Pepcid, omeprazole, Gemtesa.\par Past surgical history include cataract surgery, ovarian cystectomy, tonsillectomy, arthrodesis, breast bx.\par Patient has a family of breast cancer in mother, age 90s. Denies other cancer history.\par \par 9/20/2022 B/L contrast enhanced mammo\par - L upper central bx marker at site of previously bx-proven focus of malignancy.\par - R upper posterior 0.4 x 0.5 circumscribed nodule, likely correspond with US showing normal-appearing 10:00 LN\par \par 9/20/2022 R US\par - R 10:00 N10 0.6 x 0.4 x 0.3 cm normal-appearing LN demonstrating flow to hilum\par - R 12:00 N3 0.6 x 0.5 x 0.3 cm hypoechoic mildly shadowing mass w/  angular margins-> USG-CNB \par \par 9/27/2022 R USG-CNB ([facility])\par - R 12:00 N3 (heart):  benign tissue w/ stromal fibrosis, concordant \par \par 10/27/2022 L lumpectomy and L SLNB\par - L SLNB (0/2) negative for metastatic carcinoma\par - L breast no residual IDC; focal DCIS, nuclear grade1-2, micropapillary, 5 mm. LVI neg. DCIS 1.25 mm from nearest anterior margin. ALH. Bx site changes\par - L superior and lateral margin focal ALH\par - L inferior margin focal DCIS, 1 mm from designated margin; ALH\par - L anterior,  deep, medial margins negative\par - ER %, MA 71-80%, HER2 neg\par - AJCC pT1aN0, Stage 1A\par \par 11/9/2022 (postop):\par The patient reports that she had significant pain in her left axilla that lasted almost two weeks, but is now resolved. The breast was not very painful in comparison. She took extra-strength Tylenol briefly, but didn't feel that it was helpful. No fevers/chills.\par \par 1/12/2023: [facility] (21 gene panel): DICER1, VUS\par \par Interval History: Patient met with rad onc and discussed option for omission of RT given age and low risk disease. The patient has opted to omit RT and proceed with adjuvant endocrine therapy. She sought a second opinion with Dr. Jack Persaud with [facility]. Had ctDNA checked-negative. Started anastrazole after all and noting some side effects. Joint aches and constipation, sleep disturbances. Is unsure about continuing with anastrazole.

## 2023-02-08 NOTE — PHYSICAL EXAM
[Normocephalic] : normocephalic [Atraumatic] : atraumatic [EOMI] : extra ocular movement intact [PERRL] : pupils equal, round and reactive to light [Sclera nonicteric] : sclera nonicteric [Supple] : supple [No Supraclavicular Adenopathy] : no supraclavicular adenopathy [No Cervical Adenopathy] : no cervical adenopathy [No Thyromegaly] : no thyromegaly [Examined in the supine and seated position] : examined in the supine and seated position [Symmetrical] : symmetrical [Bra Size: ___] : Bra Size: [unfilled] [Grade 1] : Ptosis Grade 1 [No dominant masses] : no dominant masses in right breast  [No dominant masses] : no dominant masses left breast [No Nipple Retraction] : no left nipple retraction [No Nipple Discharge] : no left nipple discharge [Breast Mass Right Breast ___cm] : no masses [Breast Mass Left Breast ___cm] : no masses [Breast Nipple Inversion] : nipples not inverted [Breast Nipple Retraction] : nipples not retracted [Breast Nipple Flattening] : nipples not flattened [Breast Nipple Fissures] : nipples not fissured [No Axillary Lymphadenopathy] : no left axillary lymphadenopathy [No Edema] : no edema [No Rashes] : no rashes [No Ulceration] : no ulceration [de-identified] : non-labored respirations  [de-identified] : 12:00 incision well-healed, no erythema no masses [de-identified] : incision well-healed

## 2023-02-14 ENCOUNTER — NON-APPOINTMENT (OUTPATIENT)
Age: 78
End: 2023-02-14

## 2023-02-14 ENCOUNTER — APPOINTMENT (OUTPATIENT)
Dept: OPHTHALMOLOGY | Facility: CLINIC | Age: 78
End: 2023-02-14
Payer: MEDICARE

## 2023-02-14 PROCEDURE — 92014 COMPRE OPH EXAM EST PT 1/>: CPT

## 2023-02-14 PROCEDURE — 92134 CPTRZ OPH DX IMG PST SGM RTA: CPT

## 2023-02-15 ENCOUNTER — APPOINTMENT (OUTPATIENT)
Dept: ENDOCRINOLOGY | Facility: CLINIC | Age: 78
End: 2023-02-15
Payer: MEDICARE

## 2023-02-15 ENCOUNTER — APPOINTMENT (OUTPATIENT)
Dept: GASTROENTEROLOGY | Facility: CLINIC | Age: 78
End: 2023-02-15
Payer: MEDICARE

## 2023-02-15 VITALS
WEIGHT: 128 LBS | SYSTOLIC BLOOD PRESSURE: 110 MMHG | HEIGHT: 64 IN | BODY MASS INDEX: 21.85 KG/M2 | DIASTOLIC BLOOD PRESSURE: 62 MMHG | OXYGEN SATURATION: 97 % | HEART RATE: 70 BPM

## 2023-02-15 VITALS
OXYGEN SATURATION: 98 % | TEMPERATURE: 97.3 F | BODY MASS INDEX: 21.85 KG/M2 | DIASTOLIC BLOOD PRESSURE: 60 MMHG | HEIGHT: 64 IN | HEART RATE: 90 BPM | WEIGHT: 128 LBS | SYSTOLIC BLOOD PRESSURE: 125 MMHG

## 2023-02-15 PROCEDURE — 99213 OFFICE O/P EST LOW 20 MIN: CPT

## 2023-02-15 PROCEDURE — 99214 OFFICE O/P EST MOD 30 MIN: CPT

## 2023-02-15 NOTE — ASSESSMENT
[FreeTextEntry1] : This is a pleasant 77-year-old female with chronic GERD on regimen of famotidine 20 mg twice a day and omeprazole up 2-3 times a week.  No alarm symptoms.  Upper endoscopy from June 2021 without evidence of esophagitis.  She has fundic gland gastric polyps.  Colonoscopy from 2016 unremarkable.  No family history of colon cancer.  She will require screening colonoscopy in 2026.  For the constipation, I gave her options of taking stool softeners, fiber supplement such as Metamucil, Citrucel, FiberCon or MiraLAX daily.  She has used MiraLAX in the past with good response.  She is drinking plenty of water.  She can also give a trial of taking magnesium supplements.  She is to call me if she has any questions or concerns otherwise I will see her for follow-up visit in 1 year.

## 2023-02-15 NOTE — PHYSICAL EXAM
[Alert] : alert [Normal Voice/Communication] : normal voice/communication [Healthy Appearing] : healthy appearing [No Acute Distress] : no acute distress [Sclera] : the sclera and conjunctiva were normal [Hearing Threshold Finger Rub Not [place]] : hearing was normal [Normal Lips/Gums] : the lips and gums were normal [Oropharynx] : the oropharynx was normal [Normal Appearance] : the appearance of the neck was normal [No Neck Mass] : no neck mass was observed [No Respiratory Distress] : no respiratory distress [No Acc Muscle Use] : no accessory muscle use [Respiration, Rhythm And Depth] : normal respiratory rhythm and effort [Auscultation Breath Sounds / Voice Sounds] : lungs were clear to auscultation bilaterally [Heart Rate And Rhythm] : heart rate was normal and rhythm regular [Normal S1, S2] : normal S1 and S2 [Murmurs] : no murmurs [Bowel Sounds] : normal bowel sounds [Abdomen Tenderness] : non-tender [No Masses] : no abdominal mass palpated [Abdomen Soft] : soft [] : no hepatosplenomegaly [Oriented To Time, Place, And Person] : oriented to person, place, and time

## 2023-02-15 NOTE — HISTORY OF PRESENT ILLNESS
[FreeTextEntry1] : Syeda presents for follow-up visit.  Her heartburn for the most part is well controlled on omeprazole 2-3 times a week along with famotidine 20 mg twice daily.  She tried cutting back the omeprazole to 2 times a week and developed more mucus and saliva sensation in her throat.  She denies dysphagia or odynophagia.  She reports that she was doing well with bowel movements until she was started on anastrozole for history of breast cancer.  Since on anastrozole she is feeling more constipated.  She relates that she still has bowel movements daily but it seems to be harder.  No rectal bleeding or melena.

## 2023-02-17 RX ORDER — DENOSUMAB 60 MG/ML
60 INJECTION SUBCUTANEOUS
Refills: 0 | Status: ACTIVE | COMMUNITY

## 2023-02-17 NOTE — PAST MEDICAL HISTORY
[Menopause Age____] : age at menopause was [unfilled] [History of Hormone Replacement Treatment] : has a history of hormone replacement treatment [de-identified] : HRT x 5 years

## 2023-02-17 NOTE — HISTORY OF PRESENT ILLNESS
[Alendronate (Fosomax)] : Alendronate [Risedronate (Actonel)] : Risedronate [Zoledronic Acid (Zometa)] : Zoledronic Acid [FreeTextEntry1] : Patient returns for a follow up visit for .osteoporosis. Pt was that she has a new dx of breast cancer October 27, 2022. She was treated with a lumpectomy and required no radiation therapy. Pt  is now taking an aromatase inhibitor anastrazole 1 mg. She states that she is having some side effects from the medication such joint pain at night. \par \par According to recent labs there is some suspicion of subclinical hypothyroidism. \par \par Pt has been dx-ed with osteoporosis for many years. She took Fosamax for about 5 years, on drug holiday since 2013. A repeat BMD test June 2016 showed decreasing values the lowest -2.4 and femoral neck. BMD 2017 showed mild decrease in bone density in spine and hip, c/w osteopenia. No osteoporosis related fx, or any recent fx despite minor falls/slips in 2/2018 and 5/2018 (saw podiatrist). 7/2018 fell, no fx, just back pain. Pt has an active lifestyle. Pt restarted medication with Actonel 07/2019. Pt c/o UGI sx. \par Changed to Reclast first dose 2/2020 2 nd dose 8/2021. Tolerated well. No thigh pain, no interval fx, no APR. Last DDS within past 6 months. No ONJ. BMD 8/2020 increased spine, no significant change hip, although decreased vs 2017.\par

## 2023-02-17 NOTE — ASSESSMENT
[Bisphosphonate Therapy] : Risks and benefits of bisphosphonate therapy were  discussed with the patient including gastroesophageal irritation, osteonecrosis of the jaw, and atypical femur fractures, and acute phase reaction [FreeTextEntry3] : IV Reclast [FreeTextEntry1] : 77 year-old female postmenopausal osteoporosis for many years. \par \par Pt has been on drug holiday since 2013. BMD 2016 appeared to show decreasing hip value. Repeat test 7/2017 shows osteopenia which was fairly stable. The spine and hip decreased versus the prior outside study but this remains fairly mild. Pt was concerned about restarting oral medications of active GERD.\par \par BMD 2018 indicated stable osteopenia in all sites with slight decrease in bone density in the fem neck. No osteoporosis related fx, Minor falls/slips in 2/2018 and 5/2018 (saw podiatrist). 7/2018 fell, no fx, just back pain. BMD 7/2019 indicates stability in total hip, fem neck, and proximal radius, all c/w osteopenia. Slight decrease in spine, results c/w osteoporosis.\par  Pt began Actonel 07/2019 but experienced UGI sx. Changed to Reclast first dose 2/2020 2nd dose 8/21. Tolerated well. No thigh pain, no interval fx, no APR. No ONJ. \par \par \par I discussed that anastrazole can worsen osteoporosis. Pt should not be on aromatase inhibitor without being treated for osteoporosis. Options of  Prolia for treatment  vs  IV Reclast. I discussed the risks and benefits for each medication. Pt elects to start Prolia. \par Walter data reviewed. Walter MARROQUIN, et al ; [facility] Breast and Colorectal Cancer Study Group. Adjuvant denosumab in breast cancer (ABCSG-18): a multicentre, randomised, double-blind, placebo-controlled trial. Lancet. 2015 Aug 1;386(4272):433-74. \par P CARA Burgess, JLLUVIA Payne, M Walter, KATE Cat, M Xiomara Patel, CC Luba, T bogdan Oliveira, R Rufus, G ANDI Davalos, C Kj, B Carmen, S Jl, J Aster, N Al-Michaela, X Simona, E Anupam Verma, A George R Andrez, RE Carlos Alberto. Management of Aromatase Inhibitor-Associated Bone Loss (AIBL) in postmenopausal women with hormone sensitive breast cancer: Joint position statement of the IOF, CABS, ECTS, IEG, ESCEO, IMS, and SIOG. Journal of Bone Oncology, Vol. 7, June 2017, Pages 1-12 \par Will investigate insurance. \par \par h/o Subclinical hypothyroidism clinically euthyroid on physical examination.  Repeat thyroid function tests\par F/u 3 wks for Prolia

## 2023-02-21 ENCOUNTER — OUTPATIENT (OUTPATIENT)
Dept: OUTPATIENT SERVICES | Facility: HOSPITAL | Age: 78
LOS: 1 days | Discharge: HOME | End: 2023-02-21

## 2023-02-21 DIAGNOSIS — Z98.890 OTHER SPECIFIED POSTPROCEDURAL STATES: Chronic | ICD-10-CM

## 2023-02-21 DIAGNOSIS — Z90.89 ACQUIRED ABSENCE OF OTHER ORGANS: Chronic | ICD-10-CM

## 2023-02-21 DIAGNOSIS — Z85.828 PERSONAL HISTORY OF OTHER MALIGNANT NEOPLASM OF SKIN: Chronic | ICD-10-CM

## 2023-02-21 DIAGNOSIS — Z98.42 CATARACT EXTRACTION STATUS, LEFT EYE: Chronic | ICD-10-CM

## 2023-02-21 DIAGNOSIS — C50.919 MALIGNANT NEOPLASM OF UNSPECIFIED SITE OF UNSPECIFIED FEMALE BREAST: ICD-10-CM

## 2023-02-21 DIAGNOSIS — Z98.41 CATARACT EXTRACTION STATUS, RIGHT EYE: Chronic | ICD-10-CM

## 2023-02-21 DIAGNOSIS — Q76.1 KLIPPEL-FEIL SYNDROME: Chronic | ICD-10-CM

## 2023-03-01 ENCOUNTER — APPOINTMENT (OUTPATIENT)
Dept: HEMATOLOGY ONCOLOGY | Facility: CLINIC | Age: 78
End: 2023-03-01

## 2023-03-08 ENCOUNTER — LABORATORY RESULT (OUTPATIENT)
Age: 78
End: 2023-03-08

## 2023-03-08 ENCOUNTER — APPOINTMENT (OUTPATIENT)
Dept: ENDOCRINOLOGY | Facility: CLINIC | Age: 78
End: 2023-03-08
Payer: MEDICARE

## 2023-03-08 PROCEDURE — 96372 THER/PROPH/DIAG INJ SC/IM: CPT

## 2023-03-08 RX ORDER — DENOSUMAB 60 MG/ML
60 INJECTION SUBCUTANEOUS
Qty: 1 | Refills: 0 | Status: COMPLETED | OUTPATIENT
Start: 2023-03-07

## 2023-03-13 ENCOUNTER — APPOINTMENT (OUTPATIENT)
Dept: HEMATOLOGY ONCOLOGY | Facility: CLINIC | Age: 78
End: 2023-03-13
Payer: MEDICARE

## 2023-03-13 VITALS
DIASTOLIC BLOOD PRESSURE: 79 MMHG | RESPIRATION RATE: 16 BRPM | SYSTOLIC BLOOD PRESSURE: 151 MMHG | HEART RATE: 70 BPM | WEIGHT: 130.07 LBS | OXYGEN SATURATION: 99 % | BODY MASS INDEX: 22.33 KG/M2 | TEMPERATURE: 96.9 F

## 2023-03-13 LAB
ALBUMIN SERPL ELPH-MCNC: 4.8 G/DL
ALP BLD-CCNC: 46 U/L
ALT SERPL-CCNC: 21 U/L
ANION GAP SERPL CALC-SCNC: 12 MMOL/L
AST SERPL-CCNC: 22 U/L
BILIRUB SERPL-MCNC: 0.3 MG/DL
BUN SERPL-MCNC: 15 MG/DL
CALCIUM SERPL-MCNC: 10.4 MG/DL
CHLORIDE SERPL-SCNC: 94 MMOL/L
CO2 SERPL-SCNC: 30 MMOL/L
CREAT SERPL-MCNC: 0.71 MG/DL
EGFR: 89 ML/MIN/1.73M2
GLUCOSE SERPL-MCNC: 116 MG/DL
POTASSIUM SERPL-SCNC: 4 MMOL/L
PROT SERPL-MCNC: 7.5 G/DL
SODIUM SERPL-SCNC: 136 MMOL/L
T3RU NFR SERPL: 0.9 TBI
T4 SERPL-MCNC: 7.2 UG/DL
TSH SERPL-ACNC: 4.51 UIU/ML

## 2023-03-13 PROCEDURE — 99215 OFFICE O/P EST HI 40 MIN: CPT

## 2023-03-18 NOTE — REASON FOR VISIT
[Follow-Up Visit] : a follow-up [Other: _____] : [unfilled] [FreeTextEntry2] : Invasive ductal carcinoma of breast of the left breast

## 2023-03-18 NOTE — ASSESSMENT
[FreeTextEntry1] : In summary, Ms. CALI GHOSH is a 77 year old postmenopausal female with stage IA (T1a, N0, M0) ER positive, HI positive, HER-2/eve negative well differentiated invasive ductal carcinoma of the left breast. She is s/p lumpectomy on 10/27/22. No RT. Started AI 1/2023. BRCA negative\par \par 3/13/2023.  Initial visit in December 2022.  She was recommended to take anastrozole for early stage breast cancer.  Today she reports that she is f/b Dr Jack Thompson ( med onc and integrative mediicne). HE rec to check estrogen ( Estradiol 8) which was very low. She was told that she had very little tumor and she doesn’t have to take AI. She is rec to take natural supplements. CtDNA negative. She was reassured\par SHE is concerned about bone issues related to anastrozole and is on the fence about AI \par She reports she is very proactive about her health and doesn’t want to take pills. \par In any case, she has been on anastrozole for 3 months. She has occasional mild joint pain. She doenst have pain during the day, feels achiness at night\par mammogram - with Dr Millan\par DEXA- Osteopenia 8/2022. She has osteoporosis in the past, s/p reclast. F/b endo.She is on prolia\par We discussed risk of recurrence w/o endocrine therapy and RT, however small. Given her good performance status, overall good health and no toxicity from AI after 3 months, d I encouraged her to continue AI\par SHE IS WILLING TO take it for another 3 months to see how she does and take it from there\par \par \par She is referred to see medical genetics due to personal and family history.\par \par The patient had plenty of time to ask questions and all of her questions were answered to her satisfaction. I gave her my office phone number and encouraged her to call with any questions or additional information.\par \par

## 2023-03-18 NOTE — CONSULT LETTER
[Dear  ___] : Dear  [unfilled], [Consult Letter:] : I had the pleasure of evaluating your patient, [unfilled]. [Please see my note below.] : Please see my note below. [Consult Closing:] : Thank you very much for allowing me to participate in the care of this patient.  If you have any questions, please do not hesitate to contact me. [Sincerely,] : Sincerely, [FreeTextEntry2] : Dr. Avilez Yana [FreeTextEntry3] : Eliza Kerns MD\par Attending Physician, Division of Medical Oncology and Hematology\par Medical Director, Center for Cancer, Pregnancy and Reproduction\par Medical Director, Breast Wellness and Cancer Prevention Program \par [facility] [place]\par [facility] [place]\par , [facility] [employer] [facility]

## 2023-03-18 NOTE — HISTORY OF PRESENT ILLNESS
[T: ___] : T[unfilled] [N: ___] : N[unfilled] [M: ___] : M[unfilled] [AJCC Stage: ____] : AJCC Stage: [unfilled] [de-identified] : Ms. Syeda Villareal is a 77 year old female here for an evaluation of breast cancer. Her oncologic history is as follows:\par \par She underwent routine breast imaging on 7/28/22(BIRADS 0) which showed heterogeneously dense breasts, B/L benign appearing calcs and a left 12:00 mid/anterior depth asymmetry for which additional imaging was rec. Same day US revealed a right  4:00 4 mm cyst, right 10:00 5 mm benign intramammary LN and  L 12:00 N1 5 x 4 x 6 mm hypoechoic mass, likely corresponds to above questioned asymmetry for which targeted US is rec. On 8/12/22 (BIRADS 4) she underwent left breast imaging which showed left breast 12:00 4cm 0.7cm suspicious mass on mammo and  L 12:00 N4  0.5 x 0.7 x 0.5 cm irregular hypoechoic mass correlates on sono for which  US guided core biopsy is rec. No left axillary lymphadenopathy.\par \par  She underwent left breast 12 o'clock 4cm FN core biopsy on 8/24/2022 which showed infiltrating duct carcinoma well differentiated w/ microcalcs  Rafael score 4/9. measuring 0.4 cm, ER+ %, PA+ 71-80%, HER2 negative.  Low-intermediate grade DCIS, cribriform, solid, micropapillary type, focal single necrosis and microcalcifications present, LCIS, classic type also noted.\par \par She underwent a breast enhanced mammo on 09/20/2022 (BI-RADS 6) which showed No radiographic evidence of malignancy in either breast separate from the site of the biopsy-proven malignancy in the upper central left breast There is a suspicious hypoechoic mass at the 12:00 right breast by sonography for which ultrasound-guided core biopsy is recommended.\par \par  On 9/27/2022 she underwent Right breast ultrasound guided core biopsy at 12 o'clock 3cmfn x 3 cores which revealed  Benign breast tissue with stromal fibrosis. These results are CONCORDANT\par \par She underwent left breast lumpectomy and SLNB on 10/27/22 which revealed  ER %, PA 71-80%, HER2 neg Invasive ductal carcinoma, Rafael grad 1, measuring 4 mm, margins were negative, no lymphovascular invasion seen.No residual Invasive ductal carcinoma, focal DCIS, nuclear grade1-2, micropapillary, measuring 5 mm. DCIS 1.25 mm from nearest anterior margin. ALH. Bx site changes\par \par She used HRT x 5 yrs, h/o vag pessary for short period of time\par  [de-identified] : In summary, Ms. CALI GHOSH is a 77 year old postmenopausal female with stage IA (T1a, N0, M0) ER positive, SD positive, HER-2/eve negative well differentiated invasive ductal carcinoma of the left breast. She is s/p lumpectomy on 10/27/22. No RT. Started AI 1/2023. BRCA negative\par \par 3/13/2023.  Initial visit in December 2022.  She was recommended to take anastrozole for early stage breast cancer.  Today she reports that she is f/b Dr Jack Thompson ( med onc and integrative mediicne). HE rec to check estrogen ( Estradiol 8) which was very low. She was told that she had very little tumor and she doesn’t have to take AI. She is rec to take natural supplements. CtDNA negative. She was reassured\par SHE is concerned about bone issues related to anastrozole and is on the fence about AI \par She reports she is very proactive about her health and doesn’t want to take pills. \par In any case, she has been on anastrozole for 3 months. She has occasional mild joint pain. She doenst have pain during the day, feels achiness at night\par mammogram - with Dr Millan\par DEXA- Osteopenia 8/2022. She has osteoporosis in the past, s/p reclast. F/b endo.She is on prolia\par We discussed risk of recurrence w/o endocrine therapy and RT, however small. Given her good performance status, overall good health and no toxicity from AI after 3 months, d I encouraged her to continue AI\par SHE IS WILLING TO take it for another 3 months to see how she does and take it from there\par

## 2023-03-18 NOTE — PHYSICAL EXAM
[Fully active, able to carry on all pre-disease performance without restriction] : Status 0 - Fully active, able to carry on all pre-disease performance without restriction [Normal] : affect appropriate [de-identified] : healing lumpectomy and axillary scar

## 2023-04-07 ENCOUNTER — NON-APPOINTMENT (OUTPATIENT)
Age: 78
End: 2023-04-07

## 2023-04-10 ENCOUNTER — NON-APPOINTMENT (OUTPATIENT)
Age: 78
End: 2023-04-10

## 2023-04-10 RX ORDER — IPRATROPIUM BROMIDE 21 UG/1
0.03 SPRAY NASAL 3 TIMES DAILY
Qty: 3 | Refills: 3 | Status: ACTIVE | COMMUNITY
Start: 2023-04-10 | End: 1900-01-01

## 2023-04-14 NOTE — REVIEW OF SYSTEMS
[Patient Intake Form Reviewed] : Patient intake form was reviewed [phone] [Negative] : Heme/Lymph [de-identified] : healed surgical scars on left breast/axilla

## 2023-05-24 ENCOUNTER — OUTPATIENT (OUTPATIENT)
Dept: OUTPATIENT SERVICES | Facility: HOSPITAL | Age: 78
LOS: 1 days | Discharge: HOME | End: 2023-05-24

## 2023-05-24 DIAGNOSIS — Z90.89 ACQUIRED ABSENCE OF OTHER ORGANS: Chronic | ICD-10-CM

## 2023-05-24 DIAGNOSIS — Z98.42 CATARACT EXTRACTION STATUS, LEFT EYE: Chronic | ICD-10-CM

## 2023-05-24 DIAGNOSIS — Z98.890 OTHER SPECIFIED POSTPROCEDURAL STATES: Chronic | ICD-10-CM

## 2023-05-24 DIAGNOSIS — Q76.1 KLIPPEL-FEIL SYNDROME: Chronic | ICD-10-CM

## 2023-05-24 DIAGNOSIS — Z98.41 CATARACT EXTRACTION STATUS, RIGHT EYE: Chronic | ICD-10-CM

## 2023-05-24 DIAGNOSIS — C50.919 MALIGNANT NEOPLASM OF UNSPECIFIED SITE OF UNSPECIFIED FEMALE BREAST: ICD-10-CM

## 2023-05-24 DIAGNOSIS — Z85.828 PERSONAL HISTORY OF OTHER MALIGNANT NEOPLASM OF SKIN: Chronic | ICD-10-CM

## 2023-05-26 ENCOUNTER — APPOINTMENT (OUTPATIENT)
Dept: INTERNAL MEDICINE | Facility: CLINIC | Age: 78
End: 2023-05-26
Payer: MEDICARE

## 2023-05-26 VITALS — OXYGEN SATURATION: 98 % | HEART RATE: 65 BPM | WEIGHT: 130 LBS | HEIGHT: 64 IN | BODY MASS INDEX: 22.2 KG/M2

## 2023-05-26 VITALS — DIASTOLIC BLOOD PRESSURE: 70 MMHG | SYSTOLIC BLOOD PRESSURE: 134 MMHG

## 2023-05-26 DIAGNOSIS — Z87.19 PERSONAL HISTORY OF OTHER DISEASES OF THE DIGESTIVE SYSTEM: ICD-10-CM

## 2023-05-26 DIAGNOSIS — Z87.898 PERSONAL HISTORY OF OTHER SPECIFIED CONDITIONS: ICD-10-CM

## 2023-05-26 PROCEDURE — 99214 OFFICE O/P EST MOD 30 MIN: CPT

## 2023-05-26 RX ORDER — OMEPRAZOLE 20 MG/1
20 CAPSULE, DELAYED RELEASE ORAL
Qty: 90 | Refills: 3 | Status: ACTIVE | COMMUNITY
Start: 2017-01-25 | End: 1900-01-01

## 2023-05-26 RX ORDER — ZOLEDRONIC ACID 5 MG/100ML
5 INJECTION INTRAVENOUS
Qty: 1 | Refills: 0 | Status: DISCONTINUED | COMMUNITY
Start: 2019-11-19 | End: 2023-05-26

## 2023-05-26 NOTE — PHYSICAL EXAM
[Well Nourished] : well nourished [Well Developed] : well developed [Well-Appearing] : well-appearing [Normal Sclera/Conjunctiva] : normal sclera/conjunctiva [PERRL] : pupils equal round and reactive to light [EOMI] : extraocular movements intact [Normal Outer Ear/Nose] : the outer ears and nose were normal in appearance [Normal Oropharynx] : the oropharynx was normal [No JVD] : no jugular venous distention [No Lymphadenopathy] : no lymphadenopathy [Supple] : supple [Thyroid Normal, No Nodules] : the thyroid was normal and there were no nodules present [No Respiratory Distress] : no respiratory distress  [No Accessory Muscle Use] : no accessory muscle use [Clear to Auscultation] : lungs were clear to auscultation bilaterally [Normal Rate] : normal rate  [Regular Rhythm] : with a regular rhythm [Normal S1, S2] : normal S1 and S2 [No Murmur] : no murmur heard [No Carotid Bruits] : no carotid bruits [No Abdominal Bruit] : a ~M bruit was not heard ~T in the abdomen [Pedal Pulses Present] : the pedal pulses are present [No Edema] : there was no peripheral edema [No Palpable Aorta] : no palpable aorta [No Extremity Clubbing/Cyanosis] : no extremity clubbing/cyanosis [Soft] : abdomen soft [Non Tender] : non-tender [Non-distended] : non-distended [No Masses] : no abdominal mass palpated [No HSM] : no HSM [Normal Bowel Sounds] : normal bowel sounds [No CVA Tenderness] : no CVA  tenderness [No Spinal Tenderness] : no spinal tenderness [No Joint Swelling] : no joint swelling [Grossly Normal Strength/Tone] : grossly normal strength/tone [No Rash] : no rash [Coordination Grossly Intact] : coordination grossly intact [No Focal Deficits] : no focal deficits [Normal Gait] : normal gait [Deep Tendon Reflexes (DTR)] : deep tendon reflexes were 2+ and symmetric [Normal Affect] : the affect was normal [Normal Insight/Judgement] : insight and judgment were intact [de-identified] : LE varicosities [de-identified] : scar around left areolar- site of lumpectomy incision

## 2023-05-26 NOTE — HISTORY OF PRESENT ILLNESS
[FreeTextEntry1] : 77+ yo for scheduled f/u [de-identified] : Last seen in Dec with contact since- see notes\par \par Patient has been generally well without any significant intercurrent issues or problems. Adherent with medications as noted without side effects. Appetite good and weight reportedly stable. Active with good exercise tolerance. No sx of chest pain, sob, palpitations, orthopnea, PND, HECTOR, edema, lightheadedness..\par \par DId start antiestrogens as adjuvant to breast cancer in January- doing generally well on med

## 2023-05-26 NOTE — HEALTH RISK ASSESSMENT
[Yes] : Yes [4 or more  times a week (4 pts)] : 4 or more  times a week (4 points) [1 or 2 (0 pts)] : 1 or 2 (0 points) [Never (0 pts)] : Never (0 points) [No] : In the past 12 months have you used drugs other than those required for medical reasons? No [No falls in past year] : Patient reported no falls in the past year [0] : 2) Feeling down, depressed, or hopeless: Not at all (0) [PHQ-2 Negative - No further assessment needed] : PHQ-2 Negative - No further assessment needed [Never] : Never [de-identified] : [facility] 4/23- covid test- [de-identified] : Heme Onc- ENdo-Bone and GI [Audit-CScore] : 4 [de-identified] : watching grandchildren- active [de-identified] : Eats all foods- low fat- a lot of fruits and vegetables [de-identified] : 10/20- tripped over something left on floor by grandchild- no significant injury [[facility]] : 7 [[id number]] : 0

## 2023-06-07 ENCOUNTER — APPOINTMENT (OUTPATIENT)
Dept: HEMATOLOGY ONCOLOGY | Facility: CLINIC | Age: 78
End: 2023-06-07

## 2023-07-08 ENCOUNTER — RX RENEWAL (OUTPATIENT)
Age: 78
End: 2023-07-08

## 2023-07-08 RX ORDER — FAMOTIDINE 20 MG/1
20 TABLET, FILM COATED ORAL
Qty: 180 | Refills: 3 | Status: ACTIVE | COMMUNITY
Start: 2020-02-05 | End: 1900-01-01

## 2023-07-10 ENCOUNTER — RX RENEWAL (OUTPATIENT)
Age: 78
End: 2023-07-10

## 2023-07-28 ENCOUNTER — RESULT REVIEW (OUTPATIENT)
Age: 78
End: 2023-07-28

## 2023-07-28 ENCOUNTER — OUTPATIENT (OUTPATIENT)
Dept: OUTPATIENT SERVICES | Facility: HOSPITAL | Age: 78
LOS: 1 days | End: 2023-07-28
Payer: MEDICARE

## 2023-07-28 ENCOUNTER — APPOINTMENT (OUTPATIENT)
Dept: ULTRASOUND IMAGING | Facility: IMAGING CENTER | Age: 78
End: 2023-07-28
Payer: MEDICARE

## 2023-07-28 ENCOUNTER — APPOINTMENT (OUTPATIENT)
Dept: MAMMOGRAPHY | Facility: IMAGING CENTER | Age: 78
End: 2023-07-28
Payer: MEDICARE

## 2023-07-28 DIAGNOSIS — Z00.00 ENCOUNTER FOR GENERAL ADULT MEDICAL EXAMINATION WITHOUT ABNORMAL FINDINGS: ICD-10-CM

## 2023-07-28 DIAGNOSIS — Z90.89 ACQUIRED ABSENCE OF OTHER ORGANS: Chronic | ICD-10-CM

## 2023-07-28 DIAGNOSIS — Z98.41 CATARACT EXTRACTION STATUS, RIGHT EYE: Chronic | ICD-10-CM

## 2023-07-28 DIAGNOSIS — Z98.890 OTHER SPECIFIED POSTPROCEDURAL STATES: Chronic | ICD-10-CM

## 2023-07-28 DIAGNOSIS — Q76.1 KLIPPEL-FEIL SYNDROME: Chronic | ICD-10-CM

## 2023-07-28 DIAGNOSIS — C50.919 MALIGNANT NEOPLASM OF UNSPECIFIED SITE OF UNSPECIFIED FEMALE BREAST: ICD-10-CM

## 2023-07-28 DIAGNOSIS — Z85.828 PERSONAL HISTORY OF OTHER MALIGNANT NEOPLASM OF SKIN: Chronic | ICD-10-CM

## 2023-07-28 DIAGNOSIS — Z98.42 CATARACT EXTRACTION STATUS, LEFT EYE: Chronic | ICD-10-CM

## 2023-07-28 PROCEDURE — G0279: CPT | Mod: 26

## 2023-07-28 PROCEDURE — 76641 ULTRASOUND BREAST COMPLETE: CPT

## 2023-07-28 PROCEDURE — 77066 DX MAMMO INCL CAD BI: CPT | Mod: 26

## 2023-07-28 PROCEDURE — 76641 ULTRASOUND BREAST COMPLETE: CPT | Mod: 26,50,GA

## 2023-07-28 PROCEDURE — 77066 DX MAMMO INCL CAD BI: CPT

## 2023-07-28 PROCEDURE — G0279: CPT

## 2023-08-09 ENCOUNTER — APPOINTMENT (OUTPATIENT)
Dept: SURGICAL ONCOLOGY | Facility: CLINIC | Age: 78
End: 2023-08-09
Payer: MEDICARE

## 2023-08-09 VITALS
SYSTOLIC BLOOD PRESSURE: 132 MMHG | HEART RATE: 92 BPM | BODY MASS INDEX: 21.93 KG/M2 | RESPIRATION RATE: 16 BRPM | WEIGHT: 130 LBS | OXYGEN SATURATION: 97 % | DIASTOLIC BLOOD PRESSURE: 74 MMHG | HEIGHT: 64.5 IN

## 2023-08-09 PROCEDURE — 99213 OFFICE O/P EST LOW 20 MIN: CPT

## 2023-08-09 RX ORDER — CALCIUM CITRATE/VITAMIN D3 315MG-6.25
TABLET ORAL
Refills: 0 | Status: ACTIVE | COMMUNITY

## 2023-08-09 NOTE — ASSESSMENT
[FreeTextEntry1] : The patient is a 77 year F referred by Dr. Blank Ware for consultation regarding screening detected IDC, 7 mm on US, ER/OH positive, HER2 negative s/p L lumpectomy and L SLNB on 10/27/2022  10/27/2022 L lumpectomy and L SLNB - L SLNB (0/2) negative for metastatic carcinoma - L breast no residual IDC; focal DCIS, nuclear grade1-2, micropapillary, 5 mm. LVI neg. DCIS 1.25 mm from nearest anterior margin. ALH. Bx site changes - L superior and lateral margin focal ALH - L inferior margin focal DCIS, 1 mm from designated margin; ALH - L anterior,  deep, medial margins negative - ER %, OH 71-80%, HER2 neg - AJCC pT1aN0, Stage 1A   11/9/2022: Exam shows a well-healing incision without evidence for infection, hematoma, or seroma.  1/12/2023: [facility] GT (21 gene panel): DICER1, VUS  7/28/23 B/L DM  - L postsurgical changes - R biopsy marker in upper central  - R upper outer quadrant stable intramammary lymph node - BR 2   7/28/23 B/L US  - R 12:00 N3 corresponding to the site of prior biopsy yielding stromal fibrosis is a clip within a subcentimeter hypoechoic mass, unchanged compared with 9/27/2022. - R 10:00 N10 is a benign-appearing intramammary lymph node - L 12:00 N4 is a postsurgical scar - BR 2   exam today no masses or lymphadenopathy  Plan: - F/u medical oncology, continue anastrazole - next imaging 7/2024 B/L SM and US - RTO 6 months

## 2023-08-09 NOTE — CONSULT LETTER
[Dear  ___] : Dear  [unfilled], [Courtesy Letter:] : I had the pleasure of seeing your patient, [unfilled], in my office today. [Please see my note below.] : Please see my note below. [Sincerely,] : Sincerely, [FreeTextEntry3] : Fatmata Millan MD Breast Surgeon Division of Surgical Oncology Department of Surgery [address] [place] [address]  Tel: [phone] Fax: [phone] Email: [email or web address]  [DrValente  ___] : Dr. SILVERIO

## 2023-08-09 NOTE — PHYSICAL EXAM
[Normocephalic] : normocephalic [Atraumatic] : atraumatic [EOMI] : extra ocular movement intact [PERRL] : pupils equal, round and reactive to light [Sclera nonicteric] : sclera nonicteric [Supple] : supple [No Supraclavicular Adenopathy] : no supraclavicular adenopathy [No Cervical Adenopathy] : no cervical adenopathy [No Thyromegaly] : no thyromegaly [Examined in the supine and seated position] : examined in the supine and seated position [Symmetrical] : symmetrical [Bra Size: ___] : Bra Size: [unfilled] [Grade 1] : Ptosis Grade 1 [No dominant masses] : no dominant masses in right breast  [No dominant masses] : no dominant masses left breast [No Nipple Retraction] : no left nipple retraction [No Nipple Discharge] : no left nipple discharge [Breast Mass Right Breast ___cm] : no masses [Breast Mass Left Breast ___cm] : no masses [Breast Nipple Inversion] : nipples not inverted [Breast Nipple Retraction] : nipples not retracted [Breast Nipple Flattening] : nipples not flattened [Breast Nipple Fissures] : nipples not fissured [No Axillary Lymphadenopathy] : no left axillary lymphadenopathy [No Edema] : no edema [No Rashes] : no rashes [No Ulceration] : no ulceration [de-identified] : non-labored respirations  [de-identified] : 12:00 incision well-healed, no erythema no masses [de-identified] : incision well-healed

## 2023-08-09 NOTE — HISTORY OF PRESENT ILLNESS
[FreeTextEntry1] : The patient is a 78 year F referred by Dr. Blank Ware for consultation regarding IDC s/p L lumpectomy and L SLNB on 10/27/2022  She is not accompanied by her  Vignesh.  Prior History: The patient reports prior history of bilateral benign breast biopsies in .  She reports routine mammography since then, with no suspicious findings until this year.  Of note, she did use Premarin vaginal cream starting 2021 for bladder reasons, switched to Imvexxy, then stopped completely in 3/2022.  Most recent imagin2022 B/L SM ([facility]) revealed heterogeneously dense breasts  - B/L benign appearing calcs - L 12:00 mid/anterior depth asymmetry -> f/u additional imaging  2022 B/L US - R 4:00 4 mm cyst - R 10:00 5 mm benign intramammary LN - L 12:00 N1 5 x 4 x 6 mm hypoechoic mass, likely corresponds to above questioned asymmetry -> f/u targeted US - BR0  2022 L DM ([facility]) - L 12:00 middle depth: persistent irregular mass. US correlate seen below.  2022 L US - L 12:00 N4 (previously 12:00 N1): 0.5 x 0.7 x 0.5 cm irregular hypoechoic mass correlates w/ DM-> L USG-CNB  2022 L USG-CNB ([facility]) - L 12:00 N4 (cork): IDC, well differentiated w/ microcalcs. DCIS low-intermediate grade, cribriform, solid, micropapillary, nuclear grade 1-2, necrosis present, focal single necrosis, microcalcs; LCIS, classic type. -ER %, MA 71-80%, HER2 1+  She denies any breast symptoms- no masses, skin changes, nipple discharge.   Patient has a history of subclinical hypothyroidism, GERD, osteoporosis, HTN, hyperlipidemia Her medications include chlorthalidone, Pepcid, omeprazole, Gemtesa. Past surgical history include cataract surgery, ovarian cystectomy, tonsillectomy, arthrodesis, breast bx. Patient has a family of breast cancer in mother, age 90s. Denies other cancer history.  2022 B/L contrast enhanced mammo - L upper central bx marker at site of previously bx-proven focus of malignancy. - R upper posterior 0.4 x 0.5 circumscribed nodule, likely correspond with US showing normal-appearing 10:00 LN  2022 R US - R 10:00 N10 0.6 x 0.4 x 0.3 cm normal-appearing LN demonstrating flow to hilum - R 12:00 N3 0.6 x 0.5 x 0.3 cm hypoechoic mildly shadowing mass w/  angular margins-> USG-CNB   2022 R USG-CNB ([facility]) - R 12:00 N3 (heart):  benign tissue w/ stromal fibrosis, concordant   10/27/2022 L lumpectomy and L SLNB - L SLNB (0) negative for metastatic carcinoma - L breast no residual IDC; focal DCIS, nuclear grade1-2, micropapillary, 5 mm. LVI neg. DCIS 1.25 mm from nearest anterior margin. ALH. Bx site changes - L superior and lateral margin focal ALH - L inferior margin focal DCIS, 1 mm from designated margin; ALH - L anterior,  deep, medial margins negative - ER %, MA 71-80%, HER2 neg - AJCC pT1aN0, Stage 1A  2022 (postop): The patient reports that she had significant pain in her left axilla that lasted almost two weeks, but is now resolved. The breast was not very painful in comparison. She took extra-strength Tylenol briefly, but didn't feel that it was helpful. No fevers/chills.  2023: [facility] GT (21 gene panel): DICER1, VUS  2023: Patient met with rad onc and discussed option for omission of RT given age and low risk disease. The patient has opted to omit RT and proceed with adjuvant endocrine therapy. She sought a second opinion with Dr. Jack Persaud with [facility]. Had ctDNA checked-negative. Started anastrazole after all and noting some side effects. Joint aches and constipation, sleep disturbances. Is unsure about continuing with anastrazole.  23 B/L DM  - L postsurgical changes - R biopsy marker in upper central  - R upper outer quadrant stable intramammary lymph node - BR 2   23 B/L US  - R 12:00 N3 corresponding to the site of prior biopsy yielding stromal fibrosis is a clip within a subcentimeter hypoechoic mass, unchanged compared with 2022 - R 10:00 N10 is a benign-appearing intramammary lymph node - L 12:00 N4 is a postsurgical scar - BR 2   Interval history: Still taking anastrazole--has been noticing hair loss. Other sides effects have since resolved. Denies any breast complaints--no masses, no skin changes, no nipple discharge.

## 2023-08-29 ENCOUNTER — OUTPATIENT (OUTPATIENT)
Dept: OUTPATIENT SERVICES | Facility: HOSPITAL | Age: 78
LOS: 1 days | Discharge: HOME | End: 2023-08-29

## 2023-08-29 DIAGNOSIS — Q76.1 KLIPPEL-FEIL SYNDROME: Chronic | ICD-10-CM

## 2023-08-29 DIAGNOSIS — Z98.890 OTHER SPECIFIED POSTPROCEDURAL STATES: Chronic | ICD-10-CM

## 2023-08-29 DIAGNOSIS — Z98.42 CATARACT EXTRACTION STATUS, LEFT EYE: Chronic | ICD-10-CM

## 2023-08-29 DIAGNOSIS — Z90.89 ACQUIRED ABSENCE OF OTHER ORGANS: Chronic | ICD-10-CM

## 2023-08-29 DIAGNOSIS — Z98.41 CATARACT EXTRACTION STATUS, RIGHT EYE: Chronic | ICD-10-CM

## 2023-08-29 DIAGNOSIS — C50.919 MALIGNANT NEOPLASM OF UNSPECIFIED SITE OF UNSPECIFIED FEMALE BREAST: ICD-10-CM

## 2023-08-29 DIAGNOSIS — Z85.828 PERSONAL HISTORY OF OTHER MALIGNANT NEOPLASM OF SKIN: Chronic | ICD-10-CM

## 2023-08-30 ENCOUNTER — APPOINTMENT (OUTPATIENT)
Dept: HEMATOLOGY ONCOLOGY | Facility: CLINIC | Age: 78
End: 2023-08-30
Payer: MEDICARE

## 2023-08-30 VITALS
HEART RATE: 81 BPM | BODY MASS INDEX: 22.36 KG/M2 | TEMPERATURE: 96.6 F | SYSTOLIC BLOOD PRESSURE: 135 MMHG | RESPIRATION RATE: 16 BRPM | DIASTOLIC BLOOD PRESSURE: 67 MMHG | OXYGEN SATURATION: 99 % | WEIGHT: 132.28 LBS

## 2023-08-30 PROCEDURE — 99214 OFFICE O/P EST MOD 30 MIN: CPT

## 2023-08-30 NOTE — HISTORY OF PRESENT ILLNESS
[T: ___] : T[unfilled] [N: ___] : N[unfilled] [M: ___] : M[unfilled] [AJCC Stage: ____] : AJCC Stage: [unfilled] [de-identified] : Ms. Syeda Villareal is a 77 year old female here for an evaluation of breast cancer. Her oncologic history is as follows:  She underwent routine breast imaging on 7/28/22(BIRADS 0) which showed heterogeneously dense breasts, B/L benign appearing calcs and a left 12:00 mid/anterior depth asymmetry for which additional imaging was rec. Same day US revealed a right  4:00 4 mm cyst, right 10:00 5 mm benign intramammary LN and  L 12:00 N1 5 x 4 x 6 mm hypoechoic mass, likely corresponds to above questioned asymmetry for which targeted US is rec. On 8/12/22 (BIRADS 4) she underwent left breast imaging which showed left breast 12:00 4cm 0.7cm suspicious mass on mammo and  L 12:00 N4  0.5 x 0.7 x 0.5 cm irregular hypoechoic mass correlates on sono for which  US guided core biopsy is rec. No left axillary lymphadenopathy.   She underwent left breast 12 o'clock 4cm FN core biopsy on 8/24/2022 which showed infiltrating duct carcinoma well differentiated w/ microcalcs  Rafael score 4/9. measuring 0.4 cm, ER+ %, SC+ 71-80%, HER2 negative.  Low-intermediate grade DCIS, cribriform, solid, micropapillary type, focal single necrosis and microcalcifications present, LCIS, classic type also noted.  She underwent a breast enhanced mammo on 09/20/2022 (BI-RADS 6) which showed No radiographic evidence of malignancy in either breast separate from the site of the biopsy-proven malignancy in the upper central left breast There is a suspicious hypoechoic mass at the 12:00 right breast by sonography for which ultrasound-guided core biopsy is recommended.   On 9/27/2022 she underwent Right breast ultrasound guided core biopsy at 12 o'clock 3cmfn x 3 cores which revealed  Benign breast tissue with stromal fibrosis. These results are CONCORDANT  She underwent left breast lumpectomy and SLNB on 10/27/22 which revealed  ER %, SC 71-80%, HER2 neg Invasive ductal carcinoma, Rafael grad 1, measuring 4 mm, margins were negative, no lymphovascular invasion seen.No residual Invasive ductal carcinoma, focal DCIS, nuclear grade1-2, micropapillary, measuring 5 mm. DCIS 1.25 mm from nearest anterior margin. ALH. Bx site changes  She used HRT x 5 yrs, h/o vag pessary for short period of time   3/13/2023.  Initial visit in December 2022.  She was recommended to take anastrozole for early stage breast cancer.  Today she reports that she is f/b Dr Jack Thompson ( med onc and integrative mediicne). HE rec to check estrogen ( Estradiol 8) which was very low. She was told that she had very little tumor and she doesn't have to take AI. She is rec to take natural supplements. CtDNA negative. She was reassured SHE is concerned about bone issues related to anastrozole and is on the fence about AI  She reports she is very proactive about her health and doesn't want to take pills.  In any case, she has been on anastrozole for 3 months. She has occasional mild joint pain. She doenst have pain during the day, feels achiness at night mammogram - with Dr Millan DEXA- Osteopenia 8/2022. She has osteoporosis in the past, s/p reclast. F/b endo.She is on prolia We discussed risk of recurrence w/o endocrine therapy and RT, however small. Given her good performance status, overall good health and no toxicity from AI after 3 months, d I encouraged her to continue AI SHE IS WILLING TO take it for another 3 months to see how she does and take it from there  [de-identified] : In summary, Ms. CALI GHOSH is a 77 year old postmenopausal female with stage IA (T1a, N0, M0) ER positive, IN positive, HER-2/eve negative well differentiated invasive ductal carcinoma of the left breast. She is s/p lumpectomy on 10/27/22. No RT. Started AI 1/2023. BRCA negative   8/2023 She has been on AI since Jan 2023. Reports mild hot flashes, hair thinning. She does yoga daily and noticed some discomfort lately. She has had few nights with significant pain. Side effects are bothering her. Rec 2-3 weeks break and start exemestane. If she doesnt tolerate, will stop AI altogether.  mammogram - with Dr Millan 7/2023 DEXA- Osteopenia 8/2022. She has osteoporosis in the past, s/p reclast. F/b endo.She is on prolia

## 2023-08-30 NOTE — PHYSICAL EXAM
[Fully active, able to carry on all pre-disease performance without restriction] : Status 0 - Fully active, able to carry on all pre-disease performance without restriction [Normal] : affect appropriate [de-identified] : healing lumpectomy and axillary scar

## 2023-08-30 NOTE — CONSULT LETTER
[Dear  ___] : Dear  [unfilled], [Consult Letter:] : I had the pleasure of evaluating your patient, [unfilled]. [Please see my note below.] : Please see my note below. [Consult Closing:] : Thank you very much for allowing me to participate in the care of this patient.  If you have any questions, please do not hesitate to contact me. [Sincerely,] : Sincerely, [FreeTextEntry2] : Dr. Avilez Yana [FreeTextEntry3] : Eliza Kerns MD\par  Attending Physician, Division of Medical Oncology and Hematology\par  Medical Director, Center for Cancer, Pregnancy and Reproduction\par  Medical Director, Breast Wellness and Cancer Prevention Program \par  [facility] [place]\par  [facility] [place]\par  , [facility] [employer] [facility]

## 2023-08-30 NOTE — ASSESSMENT
[FreeTextEntry1] : In summary, Ms. CALI GHOSH is a 77 year old postmenopausal female with stage IA (T1a, N0, M0) ER positive, CO positive, HER-2/eve negative well differentiated invasive ductal carcinoma of the left breast. She is s/p lumpectomy on 10/27/22. No RT. Started AI 1/2023. BRCA negative  8/2023 She has been on AI since Jan 2023. Reports mild hot flashes, hair thinning. She does yoga daily and noticed some discomfort lately. She has had few nights with significant pain. Side effects are bothering her. Rec 2-3 weeks break and start exemestane. If she doesnt tolerate, will stop AI altogether.  mammogram - with Dr Millan 7/2023 DEXA- Osteopenia 8/2022. She has osteoporosis in the past, s/p reclast. F/b endo.She is on prolia rto 3m

## 2023-09-12 ENCOUNTER — APPOINTMENT (OUTPATIENT)
Dept: ENDOCRINOLOGY | Facility: CLINIC | Age: 78
End: 2023-09-12
Payer: MEDICARE

## 2023-09-12 VITALS
BODY MASS INDEX: 22.09 KG/M2 | DIASTOLIC BLOOD PRESSURE: 72 MMHG | SYSTOLIC BLOOD PRESSURE: 132 MMHG | HEART RATE: 83 BPM | WEIGHT: 131 LBS | HEIGHT: 64.5 IN | OXYGEN SATURATION: 97 %

## 2023-09-12 PROCEDURE — 96372 THER/PROPH/DIAG INJ SC/IM: CPT

## 2023-09-12 PROCEDURE — 99214 OFFICE O/P EST MOD 30 MIN: CPT | Mod: 25

## 2023-09-15 LAB
ALBUMIN SERPL ELPH-MCNC: 5 G/DL
ALP BLD-CCNC: 35 U/L
ALT SERPL-CCNC: 21 U/L
ANION GAP SERPL CALC-SCNC: 13 MMOL/L
AST SERPL-CCNC: 22 U/L
BILIRUB SERPL-MCNC: 0.3 MG/DL
BUN SERPL-MCNC: 14 MG/DL
CALCIUM SERPL-MCNC: 10.2 MG/DL
CHLORIDE SERPL-SCNC: 98 MMOL/L
CO2 SERPL-SCNC: 29 MMOL/L
CREAT SERPL-MCNC: 0.72 MG/DL
EGFR: 87 ML/MIN/1.73M2
GLUCOSE SERPL-MCNC: 100 MG/DL
POTASSIUM SERPL-SCNC: 4.6 MMOL/L
PROT SERPL-MCNC: 7.5 G/DL
SODIUM SERPL-SCNC: 141 MMOL/L
T4 FREE SERPL-MCNC: 1.4 NG/DL
TSH SERPL-ACNC: 4.58 UIU/ML

## 2023-09-19 LAB — COLLAGEN CTX SERPL-MCNC: 39 PG/ML

## 2023-11-10 RX ORDER — DICYCLOMINE HYDROCHLORIDE 10 MG/1
10 CAPSULE ORAL EVERY 6 HOURS
Qty: 120 | Refills: 0 | Status: ACTIVE | COMMUNITY
Start: 2023-11-10 | End: 1900-01-01

## 2023-11-14 ENCOUNTER — OUTPATIENT (OUTPATIENT)
Dept: OUTPATIENT SERVICES | Facility: HOSPITAL | Age: 78
LOS: 1 days | Discharge: HOME | End: 2023-11-14

## 2023-11-14 DIAGNOSIS — Z98.41 CATARACT EXTRACTION STATUS, RIGHT EYE: Chronic | ICD-10-CM

## 2023-11-14 DIAGNOSIS — Z90.89 ACQUIRED ABSENCE OF OTHER ORGANS: Chronic | ICD-10-CM

## 2023-11-14 DIAGNOSIS — Z98.890 OTHER SPECIFIED POSTPROCEDURAL STATES: Chronic | ICD-10-CM

## 2023-11-14 DIAGNOSIS — Q76.1 KLIPPEL-FEIL SYNDROME: Chronic | ICD-10-CM

## 2023-11-14 DIAGNOSIS — C50.919 MALIGNANT NEOPLASM OF UNSPECIFIED SITE OF UNSPECIFIED FEMALE BREAST: ICD-10-CM

## 2023-11-14 DIAGNOSIS — Z85.828 PERSONAL HISTORY OF OTHER MALIGNANT NEOPLASM OF SKIN: Chronic | ICD-10-CM

## 2023-11-14 DIAGNOSIS — Z98.42 CATARACT EXTRACTION STATUS, LEFT EYE: Chronic | ICD-10-CM

## 2023-11-29 ENCOUNTER — APPOINTMENT (OUTPATIENT)
Dept: HEMATOLOGY ONCOLOGY | Facility: CLINIC | Age: 78
End: 2023-11-29
Payer: MEDICARE

## 2023-11-29 VITALS
OXYGEN SATURATION: 97 % | RESPIRATION RATE: 16 BRPM | TEMPERATURE: 97.2 F | BODY MASS INDEX: 22.88 KG/M2 | SYSTOLIC BLOOD PRESSURE: 148 MMHG | WEIGHT: 135.36 LBS | HEART RATE: 77 BPM | DIASTOLIC BLOOD PRESSURE: 72 MMHG

## 2023-11-29 PROCEDURE — 99214 OFFICE O/P EST MOD 30 MIN: CPT

## 2023-12-08 ENCOUNTER — APPOINTMENT (OUTPATIENT)
Dept: INTERNAL MEDICINE | Facility: CLINIC | Age: 78
End: 2023-12-08
Payer: MEDICARE

## 2023-12-08 ENCOUNTER — OUTPATIENT (OUTPATIENT)
Dept: OUTPATIENT SERVICES | Facility: HOSPITAL | Age: 78
LOS: 1 days | End: 2023-12-08
Payer: MEDICARE

## 2023-12-08 VITALS
HEIGHT: 64.5 IN | SYSTOLIC BLOOD PRESSURE: 120 MMHG | WEIGHT: 134 LBS | OXYGEN SATURATION: 97 % | HEART RATE: 83 BPM | BODY MASS INDEX: 22.6 KG/M2 | DIASTOLIC BLOOD PRESSURE: 60 MMHG

## 2023-12-08 DIAGNOSIS — Z90.89 ACQUIRED ABSENCE OF OTHER ORGANS: Chronic | ICD-10-CM

## 2023-12-08 DIAGNOSIS — E78.5 HYPERLIPIDEMIA, UNSPECIFIED: ICD-10-CM

## 2023-12-08 DIAGNOSIS — K21.9 GASTRO-ESOPHAGEAL REFLUX DISEASE WITHOUT ESOPHAGITIS: ICD-10-CM

## 2023-12-08 DIAGNOSIS — Z00.00 ENCOUNTER FOR GENERAL ADULT MEDICAL EXAMINATION W/OUT ABNORMAL FINDINGS: ICD-10-CM

## 2023-12-08 DIAGNOSIS — Z98.890 OTHER SPECIFIED POSTPROCEDURAL STATES: Chronic | ICD-10-CM

## 2023-12-08 DIAGNOSIS — Z98.41 CATARACT EXTRACTION STATUS, RIGHT EYE: Chronic | ICD-10-CM

## 2023-12-08 DIAGNOSIS — Z98.42 CATARACT EXTRACTION STATUS, LEFT EYE: Chronic | ICD-10-CM

## 2023-12-08 DIAGNOSIS — K59.09 OTHER CONSTIPATION: ICD-10-CM

## 2023-12-08 DIAGNOSIS — K58.2 MIXED IRRITABLE BOWEL SYNDROME: ICD-10-CM

## 2023-12-08 DIAGNOSIS — Z85.828 PERSONAL HISTORY OF OTHER MALIGNANT NEOPLASM OF SKIN: Chronic | ICD-10-CM

## 2023-12-08 DIAGNOSIS — R73.01 IMPAIRED FASTING GLUCOSE: ICD-10-CM

## 2023-12-08 DIAGNOSIS — M81.0 AGE-RELATED OSTEOPOROSIS WITHOUT CURRENT PATHOLOGICAL FRACTURE: ICD-10-CM

## 2023-12-08 DIAGNOSIS — Z00.00 ENCOUNTER FOR GENERAL ADULT MEDICAL EXAMINATION WITHOUT ABNORMAL FINDINGS: ICD-10-CM

## 2023-12-08 DIAGNOSIS — E03.8 OTHER SPECIFIED HYPOTHYROIDISM: ICD-10-CM

## 2023-12-08 DIAGNOSIS — Z79.811 LONG TERM (CURRENT) USE OF AROMATASE INHIBITORS: ICD-10-CM

## 2023-12-08 DIAGNOSIS — Q76.1 KLIPPEL-FEIL SYNDROME: Chronic | ICD-10-CM

## 2023-12-08 DIAGNOSIS — C50.919 MALIGNANT NEOPLASM OF UNSPECIFIED SITE OF UNSPECIFIED FEMALE BREAST: ICD-10-CM

## 2023-12-08 PROCEDURE — G0439: CPT

## 2023-12-08 RX ORDER — ANASTROZOLE TABLETS 1 MG/1
1 TABLET ORAL DAILY
Qty: 1 | Refills: 1 | Status: DISCONTINUED | COMMUNITY
Start: 2022-12-02 | End: 2023-12-08

## 2023-12-08 RX ORDER — EXEMESTANE 25 MG/1
25 TABLET, FILM COATED ORAL
Qty: 90 | Refills: 1 | Status: DISCONTINUED | COMMUNITY
Start: 2023-08-30 | End: 2023-12-08

## 2023-12-26 NOTE — ASSESSMENT
Verbal report given to Jorden FRASER at [facility] unit. All questions answered. Ready for patient. [FreeTextEntry1] : \par 1. HCM--immun- ok,- flu shot this season- covid + booster- due for Tdap- prn\par  last colon 10/16 and neg- f/u 10 yrs\par  mammo July 2022 neg- f/u one year- ordered\par  gyn- July 2021 Dr.Steven Babcock- issue of terminating screen\par  normal ecg in 10/22\par  Hep C screening neg 9/12\par  TSH 2/23 last- borderline high with normal free t3/t4- monitor expectantly- asx\par  Depression screening- negative\par  HIV screening offered and deferred\par \par  2. Hyperlipidemia- diet controlled- last - calculated CV Pooled Risk Cohort greater than 5-7.5. However had cardiac ct in June 2014 with clean coronaries- pt against adding a statin and as no cad and as over aged 65,hard to argue that needs medication\par \par  3. Breast Cancer- left sided detected by abnormal mammo - s/p lumpectomy and sentinel node witih favorable size and hormone profile- did consult Rad Onc- leaning towards no RT- understands role to reduce chance of recurrent disease locally. Advised arimidex by Med -Onc, but still considering. Advised will review data with her at another time- would not start until the New Year- next mammo summer- order placed\par  \par  4.GI- IBS- colitis 2012- presumed infectious- treated- resolved. CT abd- neg. EGD in 9/12- inflammation- EGD and colon 10/16- essentially neg- see reports. Nutritionist support in past\par  \par  5. Osteoporosis- on fosamax from 1/07 and improved DEXA 2009. Off fosamax since 4/10 because of GERD sx- Boniva started 8/10 with the intention to treat for at least 5 years total of bisphosphonates.. Off Boniva in June 2012 with improved GI sx\par  \par Essentially had 5 years of treatment- d/c'd in June 2012 after DEXA results- continued drug holiday and repeated DEXA in June 2014 - basically stable- Repeat DEXA 6/16- worsening T scores in fem neck with T -2.4- unable to resume bisphosphonate for GI issues- Saw Dr Sierra in consultation July 2017 and had DEXA repeated- advised continued drug holiday and repeat testing in one year. Seen again 7/18 with BMD indicating slight decrease in density in fem neck- advised continued drug holiday- Then Actonel restarted July 2019- see notes- f/u 11/19 an dc'd med due to GI side effects- had one dose IV reclast 11/19- and second dose in Aug 2021- then Prolia first injection 2/23\par  \par  6. remote h/o melanoma 1991- and basal cell- sees derm Dr. Frost--last appt Jan 2023 goes every 6 mos-\par  \par  7. Hypertension with reactive component- BP ok\par \par f/u 6 mos for CPE- recent labs done by endo reviewed- sublclinical hypothyroid as before\par \par \par \par . \par  \par \par  \par  - - -

## 2023-12-28 ENCOUNTER — NON-APPOINTMENT (OUTPATIENT)
Age: 78
End: 2023-12-28

## 2024-01-04 DIAGNOSIS — I10 ESSENTIAL (PRIMARY) HYPERTENSION: ICD-10-CM

## 2024-01-10 ENCOUNTER — NON-APPOINTMENT (OUTPATIENT)
Age: 79
End: 2024-01-10

## 2024-01-25 ENCOUNTER — APPOINTMENT (OUTPATIENT)
Dept: OPHTHALMOLOGY | Facility: CLINIC | Age: 79
End: 2024-01-25
Payer: MEDICARE

## 2024-01-25 ENCOUNTER — NON-APPOINTMENT (OUTPATIENT)
Age: 79
End: 2024-01-25

## 2024-01-25 PROCEDURE — 92014 COMPRE OPH EXAM EST PT 1/>: CPT

## 2024-01-25 PROCEDURE — 92134 CPTRZ OPH DX IMG PST SGM RTA: CPT

## 2024-01-26 ENCOUNTER — APPOINTMENT (OUTPATIENT)
Dept: INTERNAL MEDICINE | Facility: CLINIC | Age: 79
End: 2024-01-26
Payer: MEDICARE

## 2024-01-26 ENCOUNTER — OUTPATIENT (OUTPATIENT)
Dept: OUTPATIENT SERVICES | Facility: HOSPITAL | Age: 79
LOS: 1 days | End: 2024-01-26
Payer: MEDICARE

## 2024-01-26 VITALS
HEIGHT: 64.5 IN | BODY MASS INDEX: 22.6 KG/M2 | HEART RATE: 85 BPM | SYSTOLIC BLOOD PRESSURE: 150 MMHG | WEIGHT: 134 LBS | DIASTOLIC BLOOD PRESSURE: 80 MMHG | OXYGEN SATURATION: 98 %

## 2024-01-26 DIAGNOSIS — Z90.89 ACQUIRED ABSENCE OF OTHER ORGANS: Chronic | ICD-10-CM

## 2024-01-26 DIAGNOSIS — Q76.1 KLIPPEL-FEIL SYNDROME: Chronic | ICD-10-CM

## 2024-01-26 DIAGNOSIS — Z85.828 PERSONAL HISTORY OF OTHER MALIGNANT NEOPLASM OF SKIN: Chronic | ICD-10-CM

## 2024-01-26 DIAGNOSIS — M54.9 DORSALGIA, UNSPECIFIED: ICD-10-CM

## 2024-01-26 DIAGNOSIS — Z98.42 CATARACT EXTRACTION STATUS, LEFT EYE: Chronic | ICD-10-CM

## 2024-01-26 DIAGNOSIS — I10 ESSENTIAL (PRIMARY) HYPERTENSION: ICD-10-CM

## 2024-01-26 DIAGNOSIS — Z98.41 CATARACT EXTRACTION STATUS, RIGHT EYE: Chronic | ICD-10-CM

## 2024-01-26 DIAGNOSIS — Z98.890 OTHER SPECIFIED POSTPROCEDURAL STATES: Chronic | ICD-10-CM

## 2024-01-26 PROCEDURE — G2211 COMPLEX E/M VISIT ADD ON: CPT

## 2024-01-26 PROCEDURE — G0463: CPT

## 2024-01-26 PROCEDURE — 99213 OFFICE O/P EST LOW 20 MIN: CPT

## 2024-01-26 NOTE — HEALTH RISK ASSESSMENT
[Yes] : Yes [4 or more  times a week (4 pts)] : 4 or more  times a week (4 points) [1 or 2 (0 pts)] : 1 or 2 (0 points) [No] : In the past 12 months have you used drugs other than those required for medical reasons? No [Never (0 pts)] : Never (0 points) [No falls in past year] : Patient reported no falls in the past year [0] : 2) Feeling down, depressed, or hopeless: Not at all (0) [PHQ-2 Negative - No further assessment needed] : PHQ-2 Negative - No further assessment needed [de-identified] : none [de-identified] : Heme Onc- ENdo-Bone and GI; surg [de-identified] : watching grandchildren- active [Audit-CScore] : 4 [de-identified] : Eats all foods- low fat- a lot of fruits and vegetables [de-identified] : 10/20- tripped over something left on floor by grandchild- no significant injury [[facility] [MRF2Bmqow] : 0 [Never] : Never

## 2024-01-26 NOTE — ASSESSMENT
[FreeTextEntry1] : 1. Acute Left sided back pain- radiating to back of leg - not below level of knee  LIkely lumbo-sacral strain- cannot r/o disk herniation/bulg  Pt reports "85%" better today, after resting overnight and use of lidoderm patch (daughter in law provided)  WIll prescribe lidoderm- to continue to use- proper administration reviewed  To take it easy the next few weeks, and no exercise other than walking, as tolerated  Offered PT- will let me know if wishes to proceed  Reviewed "alarm sx"- to let me know or go to ED  Pt expressed understanding of plan

## 2024-01-26 NOTE — PHYSICAL EXAM
[Well Developed] : well developed [Well Nourished] : well nourished [No Acute Distress] : no acute distress [Well-Appearing] : well-appearing [Normal Sclera/Conjunctiva] : normal sclera/conjunctiva [PERRL] : pupils equal round and reactive to light [EOMI] : extraocular movements intact [Normal Outer Ear/Nose] : the outer ears and nose were normal in appearance [Normal Oropharynx] : the oropharynx was normal [No JVD] : no jugular venous distention [No Lymphadenopathy] : no lymphadenopathy [Supple] : supple [Thyroid Normal, No Nodules] : the thyroid was normal and there were no nodules present [No Respiratory Distress] : no respiratory distress  [No Accessory Muscle Use] : no accessory muscle use [Clear to Auscultation] : lungs were clear to auscultation bilaterally [Normal Rate] : normal rate  [Regular Rhythm] : with a regular rhythm [Normal S1, S2] : normal S1 and S2 [No Murmur] : no murmur heard [No Carotid Bruits] : no carotid bruits [No Varicosities] : no varicosities [No Abdominal Bruit] : a ~M bruit was not heard ~T in the abdomen [Pedal Pulses Present] : the pedal pulses are present [No Edema] : there was no peripheral edema [No Palpable Aorta] : no palpable aorta [No Extremity Clubbing/Cyanosis] : no extremity clubbing/cyanosis [Soft] : abdomen soft [Non Tender] : non-tender [Non-distended] : non-distended [No Masses] : no abdominal mass palpated [No HSM] : no HSM [Normal Bowel Sounds] : normal bowel sounds [Normal Posterior Cervical Nodes] : no posterior cervical lymphadenopathy [Normal Anterior Cervical Nodes] : no anterior cervical lymphadenopathy [No CVA Tenderness] : no CVA  tenderness [No Spinal Tenderness] : no spinal tenderness [No Joint Swelling] : no joint swelling [Grossly Normal Strength/Tone] : grossly normal strength/tone [Coordination Grossly Intact] : coordination grossly intact [No Rash] : no rash [No Focal Deficits] : no focal deficits [Normal Gait] : normal gait [Deep Tendon Reflexes (DTR)] : deep tendon reflexes were 2+ and symmetric [Normal Affect] : the affect was normal [Normal Insight/Judgement] : insight and judgment were intact [de-identified] : normal gait including normal tandem gait and heel to toe walking

## 2024-01-26 NOTE — HISTORY OF PRESENT ILLNESS
[___ Days ago] : [unfilled] days ago [Episodic] : episodic [Severe] : severe [Rest] : rest [OTC Remedies] : OTC remedies [Activity] : with activity [In the Morning] : in the morning [Improving] : improving [de-identified] : Acute Left back Pain radiating down left thigh yesterday [FreeTextEntry1] : For the several months have twinges on left side [FreeTextEntry2] : None- no bladder or bowel sx, perineal aneshtesia, numbmness or weakness of LE, etc [FreeTextEntry4] : when getting out of bed [FreeTextEntry8] : Sx feel better today. 80% better  Last week was up and down stairs and liffting- putting away [employer] decorations.

## 2024-02-02 ENCOUNTER — APPOINTMENT (OUTPATIENT)
Dept: ORTHOPEDIC SURGERY | Facility: CLINIC | Age: 79
End: 2024-02-02
Payer: MEDICARE

## 2024-02-02 VITALS — HEIGHT: 65 IN | WEIGHT: 134 LBS | BODY MASS INDEX: 22.33 KG/M2

## 2024-02-02 PROCEDURE — 73110 X-RAY EXAM OF WRIST: CPT | Mod: RT

## 2024-02-02 PROCEDURE — 20550 NJX 1 TENDON SHEATH/LIGAMENT: CPT | Mod: RT

## 2024-02-02 PROCEDURE — 99213 OFFICE O/P EST LOW 20 MIN: CPT | Mod: 25

## 2024-02-02 PROCEDURE — J3490M: CUSTOM | Mod: NC

## 2024-02-02 NOTE — HISTORY OF PRESENT ILLNESS
[de-identified] : R wrist pain for about a week Radial sided [6] : 6 [0] : 0 [Dull/Aching] : dull/aching [Constant] : constant [Ice] : ice [] : no [FreeTextEntry1] : R wrist [FreeTextEntry3] : a week ago [FreeTextEntry5] : no injury pain with movement [FreeTextEntry9] : brace [de-identified] : activity

## 2024-02-02 NOTE — ASSESSMENT
[FreeTextEntry1] : R First dorsal compartment tendon sheath injection was performed because of pain inflammation and stiffness Anesthesia: ethyl chloride sprayed topically Celestone 6mg: An injection of Celestone 1cc Lidocaine: An injection of Lidocaine 1% 1cc Marcaine: An injection of Marcaine 0.5% 1cc  Patient has tried OTC's including aspirin, Ibuprofen, Aleve etc or prescription NSAIDS, and/or exercises at home and/ or physical therapy without satisfactory response. After verbal consent using sterile preparation and technique. The risks, benefits, and alternatives to cortisone injection were explained in full to the patient. Risks outlined include but are not limited to infection, sepsis, bleeding, scarring, skin discoloration, temporary increase in pain, syncopal episode, failure to resolve symptoms, allergic reaction, symptom recurrence, and elevation of blood sugar in diabetics. Patient understood the risks. All questions were answered. After discussion of options, patient requested an injection. Oral informed consent was obtained and sterile prep was done of the injection site. Sterile technique was utilized for the procedure including the preparation of the solutions used for the injection. Patient tolerated the procedure well. Advised to ice the injection site this evening. Prep with betadine locally to site. Sterile technique used

## 2024-02-02 NOTE — PHYSICAL EXAM
[de-identified] : R wrist Mild swelling Tender first dorsal comp Decreased thumb and wrist ROM +Finklesteins  Xrays [facility] and STT OA severe

## 2024-02-05 DIAGNOSIS — M54.9 DORSALGIA, UNSPECIFIED: ICD-10-CM

## 2024-02-07 ENCOUNTER — APPOINTMENT (OUTPATIENT)
Dept: GASTROENTEROLOGY | Facility: CLINIC | Age: 79
End: 2024-02-07
Payer: MEDICARE

## 2024-02-07 VITALS
WEIGHT: 132 LBS | SYSTOLIC BLOOD PRESSURE: 129 MMHG | HEART RATE: 92 BPM | HEIGHT: 65 IN | DIASTOLIC BLOOD PRESSURE: 79 MMHG | OXYGEN SATURATION: 99 % | BODY MASS INDEX: 21.99 KG/M2

## 2024-02-07 PROCEDURE — 99214 OFFICE O/P EST MOD 30 MIN: CPT

## 2024-02-07 PROCEDURE — G2211 COMPLEX E/M VISIT ADD ON: CPT

## 2024-02-07 NOTE — ASSESSMENT
[FreeTextEntry1] : This is a 78-year-old female with history of chronic GERD complaining of throat clearing on omeprazole 3 times a week and famotidine 20 mg twice a day.  She most likely has laryngopharyngeal reflux.  She has an appointment to see ENT this month.  I asked for a copy of consultation report faxed to me.  Colonoscopy from 2016 unremarkable.  She will need screening colonoscopy in 2026.  I will see her on a yearly basis unless there is questions or concerns.

## 2024-02-07 NOTE — HISTORY OF PRESENT ILLNESS
[FreeTextEntry1] : Syeda presents for follow-up visit.  She relates of continued GERD and that she is having throat clearing.  No dysphagia or odynophagia.  She is currently on famotidine 20 mg twice a day and takes omeprazole approximately 3 times a week.  She states her IBS has improved.  She is feeling well.  She takes dicyclomine as needed.

## 2024-02-14 ENCOUNTER — APPOINTMENT (OUTPATIENT)
Dept: SURGICAL ONCOLOGY | Facility: CLINIC | Age: 79
End: 2024-02-14
Payer: MEDICARE

## 2024-02-14 VITALS
DIASTOLIC BLOOD PRESSURE: 81 MMHG | BODY MASS INDEX: 21.99 KG/M2 | HEART RATE: 91 BPM | WEIGHT: 132 LBS | SYSTOLIC BLOOD PRESSURE: 150 MMHG | RESPIRATION RATE: 17 BRPM | HEIGHT: 65 IN | OXYGEN SATURATION: 99 %

## 2024-02-14 PROCEDURE — 99214 OFFICE O/P EST MOD 30 MIN: CPT

## 2024-02-14 NOTE — PHYSICAL EXAM
[Normocephalic] : normocephalic [Atraumatic] : atraumatic [EOMI] : extra ocular movement intact [PERRL] : pupils equal, round and reactive to light [Sclera nonicteric] : sclera nonicteric [Supple] : supple [No Supraclavicular Adenopathy] : no supraclavicular adenopathy [No Cervical Adenopathy] : no cervical adenopathy [No Thyromegaly] : no thyromegaly [Examined in the supine and seated position] : examined in the supine and seated position [Symmetrical] : symmetrical [Bra Size: ___] : Bra Size: [unfilled] [Grade 1] : Ptosis Grade 1 [No dominant masses] : no dominant masses in right breast  [No dominant masses] : no dominant masses left breast [No Nipple Retraction] : no left nipple retraction [No Nipple Discharge] : no left nipple discharge [Breast Mass Right Breast ___cm] : no masses [Breast Mass Left Breast ___cm] : no masses [Breast Nipple Inversion] : nipples not inverted [Breast Nipple Retraction] : nipples not retracted [Breast Nipple Flattening] : nipples not flattened [Breast Nipple Fissures] : nipples not fissured [No Axillary Lymphadenopathy] : no left axillary lymphadenopathy [No Edema] : no edema [No Rashes] : no rashes [No Ulceration] : no ulceration [de-identified] : non-labored respirations  [de-identified] : 12:00 incision well-healed, no erythema no masses [de-identified] : incision well-healed

## 2024-02-14 NOTE — CONSULT LETTER
[Dear  ___] : Dear  [unfilled], [Courtesy Letter:] : I had the pleasure of seeing your patient, [unfilled], in my office today. [Please see my note below.] : Please see my note below. [Sincerely,] : Sincerely, [DrValente  ___] : Dr. SILVERIO [FreeTextEntry3] : Fatmata Millan MD Breast Surgeon Division of Surgical Oncology Department of Surgery [address] [place] [address]  Tel: [phone] Fax: [phone] Email: [email or web address]

## 2024-02-14 NOTE — ASSESSMENT
[FreeTextEntry1] : The patient is a 78 year F referred by Dr. 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 LVI neg. DCIS 1.25 mm from nearest anterior margin. ALH. Bx site changes - L superior and lateral margin focal ALH - L inferior margin focal DCIS, 1 mm from designated margin; ALH - L anterior,  deep, medial margins negative - ER %, SD 71-80%, HER2 neg - AJCC pT1aN0, Stage 1A   11/9/2022: Exam shows a well-healing incision without evidence for infection, hematoma, or seroma.  1/12/2023: [facility] GT (21 gene panel): DICER1, VUS  7/28/23 B/L DM  - L postsurgical changes - R biopsy marker in upper central  - R upper outer quadrant stable intramammary lymph node - BR 2   7/28/23 B/L US  - R 12:00 N3 corresponding to the site of prior biopsy yielding stromal fibrosis is a clip within a subcentimeter hypoechoic mass, unchanged compared with 9/27/2022. - R 10:00 N10 is a benign-appearing intramammary lymph node - L 12:00 N4 is a postsurgical scar - BR 2   Exam today no masses or lymphadenopathy  Plan: - F/u medical oncology - next imaging 7/2024 B/L SM and US - RTO 6 months

## 2024-02-14 NOTE — HISTORY OF PRESENT ILLNESS
[FreeTextEntry1] : The patient is a 78 year F referred by Dr. Blank Ware for consultation regarding IDC s/p L lumpectomy and L SLNB on 10/27/2022  She is not accompanied by her  Vignesh.  Prior History: The patient reports prior history of bilateral benign breast biopsies in .  She reports routine mammography since then, with no suspicious findings until this year.  Of note, she did use Premarin vaginal cream starting 2021 for bladder reasons, switched to Imvexxy, then stopped completely in 3/2022.  Most recent imagin2022 B/L SM ([facility]) revealed heterogeneously dense breasts  - B/L benign appearing calcs - L 12:00 mid/anterior depth asymmetry -> f/u additional imaging  2022 B/L US - R 4:00 4 mm cyst - R 10:00 5 mm benign intramammary LN - L 12:00 N1 5 x 4 x 6 mm hypoechoic mass, likely corresponds to above questioned asymmetry -> f/u targeted US - BR0  2022 L DM ([facility]) - L 12:00 middle depth: persistent irregular mass. US correlate seen below.  2022 L US - L 12:00 N4 (previously 12:00 N1): 0.5 x 0.7 x 0.5 cm irregular hypoechoic mass correlates w/ DM-> L USG-CNB  2022 L USG-CNB ([facility]) - L 12:00 N4 (cork): IDC, well differentiated w/ microcalcs. DCIS low-intermediate grade, cribriform, solid, micropapillary, nuclear grade 1-2, necrosis present, focal single necrosis, microcalcs; LCIS, classic type. -ER %, NH 71-80%, HER2 1+  She denies any breast symptoms- no masses, skin changes, nipple discharge.   Patient has a history of subclinical hypothyroidism, GERD, osteoporosis, HTN, hyperlipidemia Her medications include chlorthalidone, Pepcid, omeprazole, Gemtesa. Past surgical history include cataract surgery, ovarian cystectomy, tonsillectomy, arthrodesis, breast bx. Patient has a family of breast cancer in mother, age 90s. Denies other cancer history.  2022 B/L contrast enhanced mammo - L upper central bx marker at site of previously bx-proven focus of malignancy. - R upper posterior 0.4 x 0.5 circumscribed nodule, likely correspond with US showing normal-appearing 10:00 LN  2022 R US - R 10:00 N10 0.6 x 0.4 x 0.3 cm normal-appearing LN demonstrating flow to hilum - R 12:00 N3 0.6 x 0.5 x 0.3 cm hypoechoic mildly shadowing mass w/  angular margins-> USG-CNB   2022 R USG-CNB ([facility]) - R 12:00 N3 (heart):  benign tissue w/ stromal fibrosis, concordant   10/27/2022 L lumpectomy and L SLNB - L SLNB (0) negative for metastatic carcinoma - L breast no residual IDC; focal DCIS, nuclear grade1-2, micropapillary, 5 mm. LVI neg. DCIS 1.25 mm from nearest anterior margin. ALH. Bx site changes - L superior and lateral margin focal ALH - L inferior margin focal DCIS, 1 mm from designated margin; ALH - L anterior,  deep, medial margins negative - ER %, NH 71-80%, HER2 neg - AJCC pT1aN0, Stage 1A  2022 (postop): The patient reports that she had significant pain in her left axilla that lasted almost two weeks, but is now resolved. The breast was not very painful in comparison. She took extra-strength Tylenol briefly, but didn't feel that it was helpful. No fevers/chills.  2023: [facility] GT (21 gene panel): DICER1, VUS  2023: Patient met with rad onc and discussed option for omission of RT given age and low risk disease. The patient has opted to omit RT and proceed with adjuvant endocrine therapy. She sought a second opinion with Dr. Jack Persaud with [facility]. Had ctDNA checked-negative. Started anastrazole after all and noting some side effects. Joint aches and constipation, sleep disturbances. Is unsure about continuing with anastrazole.  23 B/L DM  - L postsurgical changes - R biopsy marker in upper central  - R upper outer quadrant stable intramammary lymph node - BR 2   23 B/L US  - R 12:00 N3 corresponding to the site of prior biopsy yielding stromal fibrosis is a clip within a subcentimeter hypoechoic mass, unchanged compared with 2022 - R 10:00 N10 is a benign-appearing intramammary lymph node - L 12:00 N4 is a postsurgical scar - BR 2   2023: Still taking anastrazole--has been noticing hair loss. Other sides effects have since resolved. Denies any breast complaints--no masses, no skin changes, no nipple discharge.  Interval history: Had switched to exemestane for approx 5 months but had insomnia, joint aches, hair loss. No breast complaints today- no skin changes, no nipple discharge.

## 2024-03-18 ENCOUNTER — APPOINTMENT (OUTPATIENT)
Dept: ENDOCRINOLOGY | Facility: CLINIC | Age: 79
End: 2024-03-18
Payer: MEDICARE

## 2024-03-18 VITALS — BODY MASS INDEX: 22.53 KG/M2 | WEIGHT: 132 LBS | HEIGHT: 64.3 IN

## 2024-03-18 VITALS — HEART RATE: 74 BPM | OXYGEN SATURATION: 98 % | SYSTOLIC BLOOD PRESSURE: 140 MMHG | DIASTOLIC BLOOD PRESSURE: 70 MMHG

## 2024-03-18 PROCEDURE — G2211 COMPLEX E/M VISIT ADD ON: CPT

## 2024-03-18 PROCEDURE — 99214 OFFICE O/P EST MOD 30 MIN: CPT

## 2024-03-18 PROCEDURE — ZZZZZ: CPT

## 2024-03-18 PROCEDURE — 77081 DXA BONE DENSITY APPENDICULR: CPT | Mod: GA

## 2024-03-18 RX ADMIN — ZOLEDRONIC ACID 5 MG/100ML: 5 INJECTION, SOLUTION INTRAVENOUS at 00:00

## 2024-03-18 NOTE — PHYSICAL EXAM
[Alert] : alert [Well Nourished] : well nourished [No Acute Distress] : no acute distress [Well Developed] : well developed [Normal Sclera/Conjunctiva] : normal sclera/conjunctiva [EOMI] : extra ocular movement intact [No Proptosis] : no proptosis [Normal Lips/Gums] : the lips and gums were normal [Thyroid Not Enlarged] : the thyroid was not enlarged [No Thyroid Nodules] : no palpable thyroid nodules [No Respiratory Distress] : no respiratory distress [No Accessory Muscle Use] : no accessory muscle use [Clear to Auscultation] : lungs were clear to auscultation bilaterally [Normal S1, S2] : normal S1 and S2 [Normal Rate] : heart rate was normal [Regular Rhythm] : with a regular rhythm [No Edema] : no peripheral edema [Normal Bowel Sounds] : normal bowel sounds [Not Tender] : non-tender [Not Distended] : not distended [Soft] : abdomen soft [Normal Anterior Cervical Nodes] : no anterior cervical lymphadenopathy [No Spinal Tenderness] : no spinal tenderness [Spine Straight] : spine straight [No Stigmata of Cushings Syndrome] : no stigmata of Cushings Syndrome [Normal Gait] : normal gait [Normal Reflexes] : deep tendon reflexes were 2+ and symmetric [No Tremors] : no tremors [Oriented x3] : oriented to person, place, and time [Normal Affect] : the affect was normal [Normal Mood] : the mood was normal

## 2024-03-18 NOTE — REVIEW OF SYSTEMS
[Joint Pain] : joint pain [Insomnia] : insomnia [As Noted in HPI] : as noted in HPI [Hot Flashes] : hot flashes [Negative] : Heme/Lymph

## 2024-03-19 LAB
ALBUMIN SERPL ELPH-MCNC: 5 G/DL
ALP BLD-CCNC: 37 U/L
ALT SERPL-CCNC: 27 U/L
ANION GAP SERPL CALC-SCNC: 12 MMOL/L
AST SERPL-CCNC: 29 U/L
BILIRUB SERPL-MCNC: 0.4 MG/DL
BUN SERPL-MCNC: 12 MG/DL
CALCIUM SERPL-MCNC: 10.3 MG/DL
CHLORIDE SERPL-SCNC: 99 MMOL/L
CO2 SERPL-SCNC: 30 MMOL/L
CREAT SERPL-MCNC: 0.69 MG/DL
EGFR: 90 ML/MIN/1.73M2
GLUCOSE SERPL-MCNC: 89 MG/DL
POTASSIUM SERPL-SCNC: 4.1 MMOL/L
PROT SERPL-MCNC: 7.4 G/DL
SODIUM SERPL-SCNC: 140 MMOL/L

## 2024-03-19 NOTE — PROCEDURE
[FreeTextEntry1] :  BMD 03/18/2024 compared to 2022 Total Hip: -1.7, osteopenia, ns Spine: -2.0, osteopenia, ns Femoral Neck: -2.4, osteopenia, ns Forearm: -1.5, osteopenia, ns  Bone mineral density: 08/07/2022 indication: prior study showed rapid bone loss  spine -2.0 osteopenia no significant change  total hip -1.6 osteopenia, no significant change  femoral neck -2.4 osteopenia, no significant change  proximal radius -1.8 osteopenia, no significant change   Bone mineral density: 08/04/2021  Indication: vs. 2020 assess response to medication Spine: -2.1 osteopenia, no significant change, +6% vs. 2019 Total hip: -1.6 osteopenia, +4.3% Femoral neck: -2.3 osteopenia, +4.4% Proximal radius: -1.7 osteopenia, -3.7%  Bone mineral density 8/28/20 indication: vs 2019 prior study showed rapid bone loss assess response to medication  spine - 2.3 osteopenia +3.2% total hip -1.9 osteopenia no significant change femoral neck -2.5 osteoporosis no significant change although decreased versus 2017 proximal radius -1.3 osteopenia no significant change  Bone mineral density: 07/22/2019  Indication: vs 2018, prior test showed decreasing BMD Spine: -2.5, osteoporosis -5.9% Total hip: -1.7, osteopenia, no significant change  Femoral neck: -2.4, osteopenia, no significant change  Proximal radius: -1.6, osteopenia, no significant change   Bone mineral density: 07/18/2018  Indication: vs 2017 assess response to medication  Spine: -2.1 osteopenia, no significant change  Total hip: -1.7 osteopenia, no significant change  Femoral neck: -2.3 osteopenia (-4.3%) Proximal radius: -1.6 osteopenia, no significant change   Bone mineral density July 18, 2017 Indication prior test showed rapid bone loss Spine -2.0, osteopenia, prior outside test -1.5 Total hip - 1.6, osteopenia, No prior report Femoral neck -2.1, osteopenia, prior test -2.4 Proximal radius -1.5, osteopenia no prior

## 2024-03-19 NOTE — ASSESSMENT
[Bisphosphonate Therapy] : Risks and benefits of bisphosphonate therapy were  discussed with the patient including gastroesophageal irritation, osteonecrosis of the jaw, and atypical femur fractures, and acute phase reaction [FreeTextEntry3] : IV Reclast [FreeTextEntry1] : 78 year-old female postmenopausal osteoporosis for many years.   1. Osteoporosis: BMD 2016 appeared to show decreasing hip value. Repeat test 7/2017 showed osteopenia which was fairly stable. The spine and hip decreased versus the prior outside study but was fairly mild. Pt was concerned about restarting oral medications due to active GERD. BMD 2018 indicated stable osteopenia in all sites with slight decrease in bone density in the fem neck. No osteoporosis related fx, Minor falls/slips in 2/2018 and 5/2018 (saw podiatrist). 7/2018 fell, no fx, just back pain. BMD 7/2019 indicated stability in total hip, fem neck, and proximal radius, all c/w osteopenia. Slight decrease in spine, results c/w osteoporosis. Pt began Actonel 07/2019 but experienced UGI sx. Changed to Reclast first dose 2/2020 2nd dose 8/21. Tolerated well. I discussed that Anastrazole can worsen osteoporosis. Pt should not be on aromatase inhibitor without being treated for osteoporosis. Patient stopped taking Anastrazole1 mg due to adverse side effects. Options of Prolia for treatment vs IV Reclast. I discussed the risks and benefits for each medication. Pt elected to start Prolia. First dose in 2/2023. Tolerated well, no ONJ or AFF. C/w Prolia, buy and bill.  BMD 03/2024 shows stable osteopenia in all sites.  I discussed Reclast as an option for drug holiday based on BMD 03/2024. Patient elects to start Reclast and signed informed consent 03/18/2024. Referred to rheumatology  Current research re: continuing Prolia vs change to alternatives such as Reclast reviewed.  Louis AS, Tejinder T, Carmen SHINE. Treatment With Zoledronate Subsequent to Denosumab in Osteoporosis: A 2-Year Randomized Study. J Bone  Res. 2021 Jul;36(7):7464-7558  I have requested a basic metabolic work-up in preparation for treatment with Reclast.   2. h/o Subclinical hypothyroidism clinically euthyroid on physical examination.  I will monitor response to therapy with a baseline biochemical marker of bone turnover, CTx.  Repeat thyroid function tests and CMP.  F/u 1 year for BMD in response to Reclast.

## 2024-03-19 NOTE — HISTORY OF PRESENT ILLNESS
[Alendronate (Fosomax)] : Alendronate [Risedronate (Actonel)] : Risedronate [Zoledronic Acid (Zometa)] : Zoledronic Acid [Denosumab (Prolia)] : Denosumab [FreeTextEntry1] : Patient reports no change in medication or new history of interval surgery, hospitalizations.  Pt has been dx-ed with osteoporosis for many years. She took Fosamax for about 5 years, on drug holiday since 2013. A repeat BMD test June 2016 showed decreasing values the lowest -2.4 and femoral neck. BMD 2017 showed mild decrease in bone density in spine and hip, c/w osteopenia. No osteoporosis related fx, or any recent fx despite minor falls/slips in 2/2018 and 5/2018 (saw podiatrist). 7/2018 fell, no fx, just back pain. Pt has an active lifestyle. Pt restarted medication with Actonel 07/2019. Pt c/o UGI sx.  Changed to Reclast first dose 2/2020 2 nd dose 8/2021. Tolerated well. No thigh pain, no interval fx, no APR. Last DDS within past 6 months. No ONJ. BMD 8/2020 increased spine, no significant change hip, although decreased vs 2017.    Pt has been on Prolia since 2/2023. Tolerated well with no injection site reaction. Denies jaw pain. Last DDS within the past 2 months. No ONJ. Not planning any major dental work.  Denies thigh pain. No interval falls or fracture. BMD 03/2024 shows stable osteopenia in all sites.  PMHx Pt had dx of breast cancer October 27, 2022. She was treated with a lumpectomy and required no radiation therapy. Pt took aromatase inhibitor Anastrazole1 mg but stopped due to adverse side effects (insomnia, joint pain, hair loss, hot flashes).

## 2024-03-19 NOTE — END OF VISIT
[FreeTextEntry3] :  I, Nakita Kerns, authored this note working as a medical scribe for Dr. Sierra.  03/18/2024 .  This note was authored by the medical scribe for me. I have reviewed, edited, and revised the note as needed. I am in agreement with the exam findings, imaging findings, and treatment plan.  Russell Sierra MD

## 2024-04-01 ENCOUNTER — APPOINTMENT (OUTPATIENT)
Dept: ORTHOPEDIC SURGERY | Facility: CLINIC | Age: 79
End: 2024-04-01
Payer: MEDICARE

## 2024-04-01 VITALS — HEIGHT: 64 IN | BODY MASS INDEX: 22.53 KG/M2 | WEIGHT: 132 LBS

## 2024-04-01 PROCEDURE — 20550 NJX 1 TENDON SHEATH/LIGAMENT: CPT | Mod: RT

## 2024-04-01 PROCEDURE — J3490M: CUSTOM | Mod: NC

## 2024-04-01 PROCEDURE — 99213 OFFICE O/P EST LOW 20 MIN: CPT | Mod: 25

## 2024-04-01 NOTE — PHYSICAL EXAM
[de-identified] : R wrist Mild swelling Tender first dorsal comp Decreased thumb and wrist ROM +Finklesteins  Xrays [facility] and STT OA severe

## 2024-04-01 NOTE — HISTORY OF PRESENT ILLNESS
[6] : 6 [2] : 2 [Dull/Aching] : dull/aching [de-identified] : R bogdan Rebolledo Injection helped for 6 weeks  [FreeTextEntry1] : R wrist [de-identified] : inj  brace

## 2024-05-09 ENCOUNTER — NON-APPOINTMENT (OUTPATIENT)
Age: 79
End: 2024-05-09

## 2024-05-16 ENCOUNTER — RX RENEWAL (OUTPATIENT)
Age: 79
End: 2024-05-16

## 2024-05-21 ENCOUNTER — APPOINTMENT (OUTPATIENT)
Dept: RHEUMATOLOGY | Facility: CLINIC | Age: 79
End: 2024-05-21
Payer: MEDICARE

## 2024-05-21 VITALS
SYSTOLIC BLOOD PRESSURE: 130 MMHG | OXYGEN SATURATION: 96 % | RESPIRATION RATE: 16 BRPM | HEART RATE: 80 BPM | DIASTOLIC BLOOD PRESSURE: 73 MMHG

## 2024-05-21 VITALS
OXYGEN SATURATION: 98 % | TEMPERATURE: 98 F | DIASTOLIC BLOOD PRESSURE: 76 MMHG | RESPIRATION RATE: 16 BRPM | HEART RATE: 75 BPM | SYSTOLIC BLOOD PRESSURE: 145 MMHG

## 2024-05-21 PROCEDURE — 96374 THER/PROPH/DIAG INJ IV PUSH: CPT

## 2024-05-21 RX ORDER — ZOLEDRONIC ACID 5 MG/100ML
5 INJECTION INTRAVENOUS
Qty: 0 | Refills: 0 | Status: COMPLETED | OUTPATIENT
Start: 2024-03-18

## 2024-05-21 NOTE — HISTORY OF PRESENT ILLNESS
[Denies] : Denies [No] : No [N/A] : N/A [Yes] : Yes [Right upper extremity] : Right upper extremity [24g] : 24g [Start Time: ___] : Medication Start Time: [unfilled] [End Time: ___] : Medication End Time: [unfilled] [Medication Name: ___] : Medication Name: [unfilled] [Total Amount Administered: ___] : Total Amount Administered: [unfilled] [Patient  instructed to seek medical attention with signs and symptoms of adverse effects] : Patient  instructed to seek medical attention with signs and symptoms of adverse effects [Patient left unit in no acute distress] : Patient left unit in no acute distress [Medications administered as ordered and tolerated well.] : Medications administered as ordered and tolerated well. [IV discontinued. Intact. No signs or symptoms of IV complications noted. Time: ___] : IV discontinued. Intact. No signs or symptoms of IV complications noted. Time: [unfilled] [de-identified] : Patient arrived to infusion suite for Zoledronic Acid infusion today. Discharge papers reviewed with patient at the chairside, patient verbalized understanding. VSS. Patient tolerated infusion well, ambulated off the suite ambulatory in NAD.

## 2024-05-31 ENCOUNTER — APPOINTMENT (OUTPATIENT)
Dept: ORTHOPEDIC SURGERY | Facility: CLINIC | Age: 79
End: 2024-05-31
Payer: MEDICARE

## 2024-05-31 VITALS — WEIGHT: 132 LBS | BODY MASS INDEX: 22.53 KG/M2 | HEIGHT: 64 IN

## 2024-05-31 PROCEDURE — 20550 NJX 1 TENDON SHEATH/LIGAMENT: CPT | Mod: RT

## 2024-05-31 PROCEDURE — J3490M: CUSTOM | Mod: NC

## 2024-05-31 PROCEDURE — 99213 OFFICE O/P EST LOW 20 MIN: CPT | Mod: 25

## 2024-05-31 NOTE — PHYSICAL EXAM
[de-identified] : R wrist Mild swelling Tender first dorsal comp Decreased thumb and wrist ROM +Jeffrey

## 2024-05-31 NOTE — ASSESSMENT
[FreeTextEntry1] : R first dorsal compartment tendon sheath injection was performed because of inflammation  Anesthesia: ethyl chloride sprayed topically Celestone 6mg: An injection of Celestone 1cc Lidocaine: An injection of Lidocaine 1% 1cc Marcaine: An injection of Marcaine 0.5% 1cc   Patient has tried OTC's and HEP without satisfactory response. After verbal consent using sterile preparation and technique. The risks, benefits, and alternatives to cortisone injection were explained in full to the patient. Risks outlined include but are not limited to infection, sepsis, bleeding, scarring, skin discoloration, temporary increase in pain, syncopal episode, failure to resolve symptoms, allergic reaction, symptom recurrence, and elevation of blood sugar in diabetics. Patient understood the risks. All questions were answered. After discussion of options, patient requested an injection. Oral informed consent was obtained and sterile prep was done of the injection site. Sterile technique was utilized for the procedure including the preparation of the solutions used for the injection. Patient tolerated the procedure well. Advised to ice the injection site this evening. Prep with betadine locally to site. Sterile technique used.  I rec therapy for stiffness to improve ROM Thumb spica brace for rest

## 2024-05-31 NOTE — HISTORY OF PRESENT ILLNESS
[de-identified] : ROSALVA Valle Injections in Feb and April Help for about 4-6 weeks   [8] : 8 [3] : 3 [Dull/Aching] : dull/aching [FreeTextEntry1] : R thumb/wrist [de-identified] : inj

## 2024-06-07 ENCOUNTER — APPOINTMENT (OUTPATIENT)
Dept: INTERNAL MEDICINE | Facility: CLINIC | Age: 79
End: 2024-06-07
Payer: MEDICARE

## 2024-06-07 ENCOUNTER — OUTPATIENT (OUTPATIENT)
Dept: OUTPATIENT SERVICES | Facility: HOSPITAL | Age: 79
LOS: 1 days | End: 2024-06-07
Payer: MEDICARE

## 2024-06-07 VITALS
BODY MASS INDEX: 22.53 KG/M2 | WEIGHT: 132 LBS | HEART RATE: 84 BPM | DIASTOLIC BLOOD PRESSURE: 70 MMHG | OXYGEN SATURATION: 97 % | SYSTOLIC BLOOD PRESSURE: 138 MMHG | HEIGHT: 64 IN

## 2024-06-07 DIAGNOSIS — K21.9 GASTRO-ESOPHAGEAL REFLUX DISEASE W/OUT ESOPHAGITIS: ICD-10-CM

## 2024-06-07 DIAGNOSIS — Z98.42 CATARACT EXTRACTION STATUS, LEFT EYE: Chronic | ICD-10-CM

## 2024-06-07 DIAGNOSIS — Z98.890 OTHER SPECIFIED POSTPROCEDURAL STATES: Chronic | ICD-10-CM

## 2024-06-07 DIAGNOSIS — Z90.89 ACQUIRED ABSENCE OF OTHER ORGANS: Chronic | ICD-10-CM

## 2024-06-07 DIAGNOSIS — I10 ESSENTIAL (PRIMARY) HYPERTENSION: ICD-10-CM

## 2024-06-07 DIAGNOSIS — M81.0 AGE-RELATED OSTEOPOROSIS W/OUT CURRENT PATHOLOGICAL FRACTURE: ICD-10-CM

## 2024-06-07 DIAGNOSIS — R73.01 IMPAIRED FASTING GLUCOSE: ICD-10-CM

## 2024-06-07 DIAGNOSIS — K58.2 MIXED IRRITABLE BOWEL SYNDROME: ICD-10-CM

## 2024-06-07 DIAGNOSIS — C50.919 MALIGNANT NEOPLASM OF UNSPECIFIED SITE OF UNSPECIFIED FEMALE BREAST: ICD-10-CM

## 2024-06-07 DIAGNOSIS — Q76.1 KLIPPEL-FEIL SYNDROME: Chronic | ICD-10-CM

## 2024-06-07 DIAGNOSIS — M65.4 RADIAL STYLOID TENOSYNOVITIS [DE QUERVAIN]: ICD-10-CM

## 2024-06-07 DIAGNOSIS — E78.5 HYPERLIPIDEMIA, UNSPECIFIED: ICD-10-CM

## 2024-06-07 DIAGNOSIS — E03.8 OTHER SPECIFIED HYPOTHYROIDISM: ICD-10-CM

## 2024-06-07 DIAGNOSIS — K59.09 OTHER CONSTIPATION: ICD-10-CM

## 2024-06-07 DIAGNOSIS — Z98.41 CATARACT EXTRACTION STATUS, RIGHT EYE: Chronic | ICD-10-CM

## 2024-06-07 PROCEDURE — G2211 COMPLEX E/M VISIT ADD ON: CPT

## 2024-06-07 PROCEDURE — 99214 OFFICE O/P EST MOD 30 MIN: CPT

## 2024-06-07 PROCEDURE — G0463: CPT

## 2024-06-07 RX ORDER — LIDOCAINE 5% 700 MG/1
5 PATCH TOPICAL
Qty: 5 | Refills: 0 | Status: DISCONTINUED | COMMUNITY
Start: 2024-01-26 | End: 2024-06-07

## 2024-06-10 DIAGNOSIS — K21.9 GASTRO-ESOPHAGEAL REFLUX DISEASE WITHOUT ESOPHAGITIS: ICD-10-CM

## 2024-06-10 DIAGNOSIS — E03.8 OTHER SPECIFIED HYPOTHYROIDISM: ICD-10-CM

## 2024-06-10 DIAGNOSIS — M81.0 AGE-RELATED OSTEOPOROSIS WITHOUT CURRENT PATHOLOGICAL FRACTURE: ICD-10-CM

## 2024-06-10 DIAGNOSIS — M65.4 RADIAL STYLOID TENOSYNOVITIS [DE QUERVAIN]: ICD-10-CM

## 2024-06-10 DIAGNOSIS — K59.09 OTHER CONSTIPATION: ICD-10-CM

## 2024-06-10 DIAGNOSIS — C50.919 MALIGNANT NEOPLASM OF UNSPECIFIED SITE OF UNSPECIFIED FEMALE BREAST: ICD-10-CM

## 2024-06-10 DIAGNOSIS — R73.01 IMPAIRED FASTING GLUCOSE: ICD-10-CM

## 2024-06-10 DIAGNOSIS — K58.2 MIXED IRRITABLE BOWEL SYNDROME: ICD-10-CM

## 2024-06-10 DIAGNOSIS — E78.5 HYPERLIPIDEMIA, UNSPECIFIED: ICD-10-CM

## 2024-06-10 LAB
ALBUMIN SERPL ELPH-MCNC: 4.9 G/DL
ANION GAP SERPL CALC-SCNC: 14 MMOL/L
BUN SERPL-MCNC: 13 MG/DL
CALCIUM SERPL-MCNC: 9.9 MG/DL
CHLORIDE SERPL-SCNC: 97 MMOL/L
CO2 SERPL-SCNC: 26 MMOL/L
CREAT SERPL-MCNC: 0.71 MG/DL
EGFR: 88 ML/MIN/1.73M2
ESTIMATED AVERAGE GLUCOSE: 120 MG/DL
GLUCOSE SERPL-MCNC: 116 MG/DL
HBA1C MFR BLD HPLC: 5.8 %
HCT VFR BLD CALC: 41.4 %
HGB BLD-MCNC: 13.5 G/DL
MCHC RBC-ENTMCNC: 31.4 PG
MCHC RBC-ENTMCNC: 32.6 GM/DL
MCV RBC AUTO: 96.3 FL
PHOSPHATE SERPL-MCNC: 3.4 MG/DL
PLATELET # BLD AUTO: 243 K/UL
POTASSIUM SERPL-SCNC: 4.5 MMOL/L
RBC # BLD: 4.3 M/UL
RBC # FLD: 13.1 %
SODIUM SERPL-SCNC: 138 MMOL/L
T3FREE SERPL-MCNC: 2.58 PG/ML
T4 FREE SERPL-MCNC: 1.5 NG/DL
TSH SERPL-ACNC: 4.58 UIU/ML
WBC # FLD AUTO: 7.44 K/UL

## 2024-06-10 NOTE — HEALTH RISK ASSESSMENT
[Yes] : Yes [4 or more  times a week (4 pts)] : 4 or more  times a week (4 points) [1 or 2 (0 pts)] : 1 or 2 (0 points) [No] : In the past 12 months have you used drugs other than those required for medical reasons? No [No falls in past year] : Patient reported no falls in the past year [0] : 2) Feeling down, depressed, or hopeless: Not at all (0) [PHQ-2 Negative - No further assessment needed] : PHQ-2 Negative - No further assessment needed [Never] : Never [Intercurrent Urgi Care visits] : went to urgent care [Never (0 pts)] : Never (0 points) [de-identified] : Urgent- URI [Audit-CScore] : 4 [de-identified] : Breast Surg; Ortho; GI; ENT- notes reviewed [de-identified] : watching grandchildren- active [de-identified] : Eats all foods- low fat- a lot of fruits and vegetables [de-identified] : 10/20- tripped over something left on floor by grandchild- no significant injury [facility]] : 7 [[id number]] : 0

## 2024-06-10 NOTE — PHYSICAL EXAM
[Well Nourished] : well nourished [Well Developed] : well developed [Well-Appearing] : well-appearing [Normal Sclera/Conjunctiva] : normal sclera/conjunctiva [PERRL] : pupils equal round and reactive to light [EOMI] : extraocular movements intact [Normal Outer Ear/Nose] : the outer ears and nose were normal in appearance [Normal Oropharynx] : the oropharynx was normal [No JVD] : no jugular venous distention [No Lymphadenopathy] : no lymphadenopathy [Supple] : supple [Thyroid Normal, No Nodules] : the thyroid was normal and there were no nodules present [No Respiratory Distress] : no respiratory distress  [No Accessory Muscle Use] : no accessory muscle use [Clear to Auscultation] : lungs were clear to auscultation bilaterally [Normal Rate] : normal rate  [Regular Rhythm] : with a regular rhythm [Normal S1, S2] : normal S1 and S2 [No Murmur] : no murmur heard [No Carotid Bruits] : no carotid bruits [No Abdominal Bruit] : a ~M bruit was not heard ~T in the abdomen [Pedal Pulses Present] : the pedal pulses are present [No Edema] : there was no peripheral edema [No Palpable Aorta] : no palpable aorta [No Extremity Clubbing/Cyanosis] : no extremity clubbing/cyanosis [Soft] : abdomen soft [Non Tender] : non-tender [Non-distended] : non-distended [No Masses] : no abdominal mass palpated [No HSM] : no HSM [Normal Bowel Sounds] : normal bowel sounds [No CVA Tenderness] : no CVA  tenderness [No Spinal Tenderness] : no spinal tenderness [No Joint Swelling] : no joint swelling [Grossly Normal Strength/Tone] : grossly normal strength/tone [No Rash] : no rash [Coordination Grossly Intact] : coordination grossly intact [No Focal Deficits] : no focal deficits [Normal Gait] : normal gait [Deep Tendon Reflexes (DTR)] : deep tendon reflexes were 2+ and symmetric [Normal Affect] : the affect was normal [Normal Insight/Judgement] : insight and judgment were intact [de-identified] : scar around left areolar- site of lumpectomy incision [de-identified] : LE varicosities

## 2024-06-10 NOTE — HISTORY OF PRESENT ILLNESS
[FreeTextEntry1] : 79 yo for scheduled f/u for HBP, IBS, history of breats cancermusculoskeletal issues and routine care [de-identified] : Last seen 6 mos.  Adherent with medications as noted without side effects. Appetite good and weight reportedly stable. Active with good exercise tolerance. No sx of chest pain, sob, palpitations, orthopnea, PND, HECTOR, edema, lightheadedness..  Had diarrheal illness in March- from [place]- states since then does not feel "back to normal"- more fatigue Bowel habits are notmal- no bllood or melena Has stiffness in neck and hands/wrist- seeing ortho- tenosynovitis- 3 injections- temporary relief

## 2024-06-10 NOTE — ASSESSMENT
[FreeTextEntry1] : 1. HCM--immun- ok,  last colon 10/16 and neg- f/u 10 yrs  mammo July 2023- order for 2024   normal ecg in 10/22  Hep C screening neg 9/12  TSH 2/23 last- borderline high with normal free t3/t4- monitor expectantly- asx  Depression screening- negative  HIV screening offered and deferred   2. Hyperlipidemia- diet controlled- last - calculated CV Pooled Risk Cohort greater than 5-7.5. However had cardiac ct in June 2014 with clean coronaries- pt against adding a statin and as no cad and as over aged 65,hard to argue that needs medication   3. Breast Cancer- left sided detected by abnormal mammo - s/p lumpectomy and sentinel node witih favorable size and hormone profile- did consult Rad Onc- leaning towards no RT- understands role to reduce chance of recurrent disease locally. Advised arimidex by Med -Onc, - took med for less than year and stopped- see notes   4.GI- IBS- colitis 2012- presumed infectious- treated- resolved. CT abd- neg. EGD in 9/12- inflammation- EGD and colon 10/16- essentially neg- see reports. Nutritionist support in past   5. Osteoporosis- on fosamax from 1/07 and improved DEXA 2009. Off fosamax since 4/10 because of GERD sx- Boniva started 8/10 with the intention to treat for at least 5 years total of bisphosphonates.. Off Boniva in June 2012 with improved GI sx  Essentially had 5 years of treatment- d/c'd in June 2012 after DEXA results- continued drug holiday and repeated DEXA in June 2014 - basically stable- Repeat DEXA 6/16- worsening T scores in fem neck with T -2.4- unable to resume bisphosphonate for GI issues- Saw Dr Sierra in consultation July 2017 and had DEXA repeated- advised continued drug holiday and repeat testing in one year. Seen again 7/18 with BMD indicating slight decrease in density in fem neck- advised continued drug holiday- Then Actonel restarted July 2019- see notes- f/u 11/19 an dc'd med due to GI side effects- had one dose IV reclast 11/19- and second dose in Aug 2021- then Prolia first injection 2/23 - and a second- had Reclast 5/24   6. remote h/o melanoma 1991- and basal cell- sees derm Dr. Frost--last appt Jan 2023 goes every 6 mos-   7. Hypertension with reactive component- BP ok  8. Fatigue- reports since diarrheal illness in March- recheck labs- ? overt hypothyroid, anemia etc  f/u 6 mos - for CPE= advised repeat labs for TFT's and blood count-   6/10- Called pt and LM- labs all normal for pt- subclinical hypothyroid with TSH 4+- GLu 116 w/A1c 5.8- no change from previous CBC normal

## 2024-06-22 NOTE — PROCEDURE
Patients family arrives to bedside.    [FreeTextEntry1] : Bone mineral density: 08/07/2022\par indication: prior study showed rapid bone loss \par spine -2.0 osteopenia no significant change \par total hip -1.6 osteopenia, no significant change \par femoral neck -2.4 osteopenia, no significant change \par proximal radius -1.8 osteopenia, no significant change \par \par Bone mineral density: 08/04/2021 \par Indication: vs. 2020 assess response to medication\par Spine: -2.1 osteopenia, no significant change, +6% vs. 2019\par Total hip: -1.6 osteopenia, +4.3%\par Femoral neck: -2.3 osteopenia, +4.4%\par Proximal radius: -1.7 osteopenia, -3.7%\par \par Bone mineral density 8/28/20\par indication: vs 2019 prior study showed rapid bone loss assess response to medication \par spine - 2.3 osteopenia +3.2%\par total hip -1.9 osteopenia no significant change\par femoral neck -2.5 osteoporosis no significant change although decreased versus 2017\par proximal radius -1.3 osteopenia no significant change\par \par Bone mineral density: 07/22/2019 \par Indication: vs 2018, prior test showed decreasing BMD\par Spine: -2.5, osteoporosis -5.9%\par Total hip: -1.7, osteopenia, no significant change \par Femoral neck: -2.4, osteopenia, no significant change \par Proximal radius: -1.6, osteopenia, no significant change \par \par Bone mineral density: 07/18/2018 \par Indication: vs 2017 assess response to medication \par Spine: -2.1 osteopenia, no significant change \par Total hip: -1.7 osteopenia, no significant change \par Femoral neck: -2.3 osteopenia (-4.3%)\par Proximal radius: -1.6 osteopenia, no significant change \par \par Bone mineral density July 18, 2017\par Indication prior test showed rapid bone loss\par Spine -2.0, osteopenia, prior outside test -1.5\par Total hip - 1.6, osteopenia, No prior report\par Femoral neck -2.1, osteopenia, prior test -2.4\par Proximal radius -1.5, osteopenia no prior

## 2024-07-16 ENCOUNTER — RX RENEWAL (OUTPATIENT)
Age: 79
End: 2024-07-16

## 2024-07-30 ENCOUNTER — RESULT REVIEW (OUTPATIENT)
Age: 79
End: 2024-07-30

## 2024-07-30 ENCOUNTER — APPOINTMENT (OUTPATIENT)
Dept: ULTRASOUND IMAGING | Facility: IMAGING CENTER | Age: 79
End: 2024-07-30
Payer: MEDICARE

## 2024-07-30 ENCOUNTER — OUTPATIENT (OUTPATIENT)
Dept: OUTPATIENT SERVICES | Facility: HOSPITAL | Age: 79
LOS: 1 days | End: 2024-07-30
Payer: MEDICARE

## 2024-07-30 ENCOUNTER — APPOINTMENT (OUTPATIENT)
Dept: MAMMOGRAPHY | Facility: IMAGING CENTER | Age: 79
End: 2024-07-30
Payer: MEDICARE

## 2024-07-30 DIAGNOSIS — Z98.42 CATARACT EXTRACTION STATUS, LEFT EYE: Chronic | ICD-10-CM

## 2024-07-30 DIAGNOSIS — Q76.1 KLIPPEL-FEIL SYNDROME: Chronic | ICD-10-CM

## 2024-07-30 DIAGNOSIS — M81.0 AGE-RELATED OSTEOPOROSIS WITHOUT CURRENT PATHOLOGICAL FRACTURE: ICD-10-CM

## 2024-07-30 DIAGNOSIS — Z98.890 OTHER SPECIFIED POSTPROCEDURAL STATES: Chronic | ICD-10-CM

## 2024-07-30 DIAGNOSIS — Z98.41 CATARACT EXTRACTION STATUS, RIGHT EYE: Chronic | ICD-10-CM

## 2024-07-30 PROCEDURE — G0279: CPT | Mod: 26

## 2024-07-30 PROCEDURE — 77066 DX MAMMO INCL CAD BI: CPT | Mod: 26

## 2024-07-30 PROCEDURE — 77066 DX MAMMO INCL CAD BI: CPT

## 2024-07-30 PROCEDURE — 76641 ULTRASOUND BREAST COMPLETE: CPT | Mod: 26,50,3G,GA

## 2024-07-30 PROCEDURE — 76641 ULTRASOUND BREAST COMPLETE: CPT

## 2024-07-30 PROCEDURE — G0279: CPT

## 2024-08-20 ENCOUNTER — NON-APPOINTMENT (OUTPATIENT)
Age: 79
End: 2024-08-20

## 2024-08-21 ENCOUNTER — APPOINTMENT (OUTPATIENT)
Dept: SURGICAL ONCOLOGY | Facility: CLINIC | Age: 79
End: 2024-08-21
Payer: MEDICARE

## 2024-08-21 VITALS
DIASTOLIC BLOOD PRESSURE: 70 MMHG | WEIGHT: 133 LBS | HEART RATE: 73 BPM | SYSTOLIC BLOOD PRESSURE: 131 MMHG | HEIGHT: 64.5 IN | RESPIRATION RATE: 17 BRPM | OXYGEN SATURATION: 98 % | BODY MASS INDEX: 22.43 KG/M2

## 2024-08-21 PROCEDURE — 99214 OFFICE O/P EST MOD 30 MIN: CPT

## 2024-08-21 NOTE — PHYSICAL EXAM
[Normocephalic] : normocephalic [Atraumatic] : atraumatic [EOMI] : extra ocular movement intact [PERRL] : pupils equal, round and reactive to light [Sclera nonicteric] : sclera nonicteric [Supple] : supple [No Supraclavicular Adenopathy] : no supraclavicular adenopathy [No Cervical Adenopathy] : no cervical adenopathy [No Thyromegaly] : no thyromegaly [Examined in the supine and seated position] : examined in the supine and seated position [Symmetrical] : symmetrical [Bra Size: ___] : Bra Size: [unfilled] [Grade 1] : Ptosis Grade 1 [No dominant masses] : no dominant masses in right breast  [No dominant masses] : no dominant masses left breast [No Nipple Retraction] : no left nipple retraction [No Nipple Discharge] : no left nipple discharge [Breast Mass Right Breast ___cm] : no masses [Breast Mass Left Breast ___cm] : no masses [Breast Nipple Inversion] : nipples not inverted [Breast Nipple Retraction] : nipples not retracted [Breast Nipple Flattening] : nipples not flattened [Breast Nipple Fissures] : nipples not fissured [No Axillary Lymphadenopathy] : no left axillary lymphadenopathy [No Edema] : no edema [No Rashes] : no rashes [No Ulceration] : no ulceration [de-identified] : non-labored respirations  [de-identified] : UOQ fibronodular breasts [de-identified] : 12:00 incision well-healed, no erythema no masses [de-identified] : incision well-healed

## 2024-08-21 NOTE — HISTORY OF PRESENT ILLNESS
[FreeTextEntry1] : The patient is a 79 year F referred by Dr. Blank Ware for consultation regarding IDC s/p L lumpectomy and L SLNB on 10/27/2022, here for follow up.  She is not accompanied by her  Vignesh.  Prior History: The patient reports prior history of bilateral benign breast biopsies in .  She reports routine mammography since then, with no suspicious findings until this year.  Of note, she did use Premarin vaginal cream starting 2021 for bladder reasons, switched to Imvexxy, then stopped completely in 3/2022.  Most recent imagin2022 B/L SM ([facility]) revealed heterogeneously dense breasts  - B/L benign appearing calcs - L 12:00 mid/anterior depth asymmetry -> f/u additional imaging  2022 B/L US - R 4:00 4 mm cyst - R 10:00 5 mm benign intramammary LN - L 12:00 N1 5 x 4 x 6 mm hypoechoic mass, likely corresponds to above questioned asymmetry -> f/u targeted US - BR0  2022 L DM ([facility]) - L 12:00 middle depth: persistent irregular mass. US correlate seen below.  2022 L US - L 12:00 N4 (previously 12:00 N1): 0.5 x 0.7 x 0.5 cm irregular hypoechoic mass correlates w/ DM-> L USG-CNB  2022 L USG-CNB ([facility]) - L 12:00 N4 (cork): IDC, well differentiated w/ microcalcs. DCIS low-intermediate grade, cribriform, solid, micropapillary, nuclear grade 1-2, necrosis present, focal single necrosis, microcalcs; LCIS, classic type. -ER %, KS 71-80%, HER2 1+  She denies any breast symptoms- no masses, skin changes, nipple discharge.   Patient has a history of subclinical hypothyroidism, GERD, osteoporosis, HTN, hyperlipidemia Her medications include chlorthalidone, Pepcid, omeprazole, Gemtesa. Past surgical history include cataract surgery, ovarian cystectomy, tonsillectomy, arthrodesis, breast bx. Patient has a family of breast cancer in mother, age 90s. Denies other cancer history.  2022 B/L contrast enhanced mammo - L upper central bx marker at site of previously bx-proven focus of malignancy. - R upper posterior 0.4 x 0.5 circumscribed nodule, likely correspond with US showing normal-appearing 10:00 LN  2022 R US - R 10:00 N10 0.6 x 0.4 x 0.3 cm normal-appearing LN demonstrating flow to hilum - R 12:00 N3 0.6 x 0.5 x 0.3 cm hypoechoic mildly shadowing mass w/  angular margins-> USG-CNB   2022 R USG-CNB (NW) - R 12:00 N3 (heart):  benign tissue w/ stromal fibrosis, concordant   10/27/2022 L lumpectomy and L SLNB - L SLNB (0/2) negative for metastatic carcinoma - L breast no residual IDC; focal DCIS, nuclear grade1-2, micropapillary, 5 mm. LVI neg. DCIS 1.25 mm from nearest anterior margin. ALH. Bx site changes - L superior and lateral margin focal ALH - L inferior margin focal DCIS, 1 mm from designated margin; ALH - L anterior,  deep, medial margins negative - ER %, KS 71-80%, HER2 neg - AJCC pT1aN0, Stage 1A  2022 (postop): The patient reports that she had significant pain in her left axilla that lasted almost two weeks, but is now resolved. The breast was not very painful in comparison. She took extra-strength Tylenol briefly, but didn't feel that it was helpful. No fevers/chills.  2023: [facility] GT (21 gene panel): DICER1, VUS  2023: Patient met with rad onc and discussed option for omission of RT given age and low risk disease. The patient has opted to omit RT and proceed with adjuvant endocrine therapy. She sought a second opinion with Dr. Jack Persaud with [facility]. Had ctDNA checked-negative. Started anastrazole after all and noting some side effects. Joint aches and constipation, sleep disturbances. Is unsure about continuing with anastrazole.  23 B/L DM  - L postsurgical changes - R biopsy marker in upper central  - R upper outer quadrant stable intramammary lymph node - BR 2   23 B/L US  - R 12:00 N3 corresponding to the site of prior biopsy yielding stromal fibrosis is a clip within a subcentimeter hypoechoic mass, unchanged compared with 2022 - R 10:00 N10 is a benign-appearing intramammary lymph node - L 12:00 N4 is a postsurgical scar - BR 2   2023: Still taking anastrazole--has been noticing hair loss. Other sides effects have since resolved. Denies any breast complaints--no masses, no skin changes, no nipple discharge.  2024: Had switched to exemestane for approx 5 months but had insomnia, joint aches, hair loss. No breast complaints today- no skin changes, no nipple discharge.  2024 B/L DM (NW) revealed heterogeneously dense breasts -L upper central postop changes -R upper central bx clip -R outer posterior IMLN, stable, w/ sono correlate -BR2  2024 B/L US (NW)  -R 12:00 N3 5 mm prior bx site w/ clip -R 10:00 N10 IMLN -L 12:00 N4 scar site -Br2  Interval history: Is no longer on any endocrine therapy, but still feels symptoms of tiredness, achiness in her legs, hips. Symptoms wake up the patient. She also has a flareup of her tendonitis.  Patient denies any breast complaints today. Denies any breast masses, skin changes, or nipple discharge.

## 2024-08-21 NOTE — PHYSICAL EXAM
[Normocephalic] : normocephalic [Atraumatic] : atraumatic [EOMI] : extra ocular movement intact [PERRL] : pupils equal, round and reactive to light [Sclera nonicteric] : sclera nonicteric [Supple] : supple [No Supraclavicular Adenopathy] : no supraclavicular adenopathy [No Cervical Adenopathy] : no cervical adenopathy [No Thyromegaly] : no thyromegaly [Examined in the supine and seated position] : examined in the supine and seated position [Symmetrical] : symmetrical [Bra Size: ___] : Bra Size: [unfilled] [Grade 1] : Ptosis Grade 1 [No dominant masses] : no dominant masses in right breast  [No dominant masses] : no dominant masses left breast [No Nipple Retraction] : no left nipple retraction [No Nipple Discharge] : no left nipple discharge [Breast Mass Right Breast ___cm] : no masses [Breast Mass Left Breast ___cm] : no masses [Breast Nipple Inversion] : nipples not inverted [Breast Nipple Retraction] : nipples not retracted [Breast Nipple Flattening] : nipples not flattened [Breast Nipple Fissures] : nipples not fissured [No Axillary Lymphadenopathy] : no left axillary lymphadenopathy [No Edema] : no edema [No Rashes] : no rashes [No Ulceration] : no ulceration [de-identified] : non-labored respirations  [de-identified] : UOQ fibronodular breasts [de-identified] : 12:00 incision well-healed, no erythema no masses [de-identified] : incision well-healed

## 2024-08-21 NOTE — CONSULT LETTER
[Dear  ___] : Dear  [unfilled], [Courtesy Letter:] : I had the pleasure of seeing your patient, [unfilled], in my office today. [Please see my note below.] : Please see my note below. [Sincerely,] : Sincerely, [DrValente  ___] : Dr. SILVERIO [FreeTextEntry3] : Fatmata Millan MD Breast Surgeon Division of Surgical Oncology Department of Surgery [address] [place] [address]  Tel: [phone] Fax: [phone] Email: [email or web address]

## 2024-08-21 NOTE — REVIEW OF SYSTEMS
[Negative] : Heme/Lymph [As Noted in HPI] : as noted in HPI [Joint Stiffness] : joint stiffness [Limb Pain] : limb pain [FreeTextEntry4] : voice change--worked up by ENT

## 2024-08-21 NOTE — HISTORY OF PRESENT ILLNESS
[FreeTextEntry1] : The patient is a 79 year F referred by Dr. Blank Ware for consultation regarding IDC s/p L lumpectomy and L SLNB on 10/27/2022, here for follow up.  She is not accompanied by her  Vignesh.  Prior History: The patient reports prior history of bilateral benign breast biopsies in .  She reports routine mammography since then, with no suspicious findings until this year.  Of note, she did use Premarin vaginal cream starting 2021 for bladder reasons, switched to Imvexxy, then stopped completely in 3/2022.  Most recent imagin2022 B/L SM ([facility]) revealed heterogeneously dense breasts  - B/L benign appearing calcs - L 12:00 mid/anterior depth asymmetry -> f/u additional imaging  2022 B/L US - R 4:00 4 mm cyst - R 10:00 5 mm benign intramammary LN - L 12:00 N1 5 x 4 x 6 mm hypoechoic mass, likely corresponds to above questioned asymmetry -> f/u targeted US - BR0  2022 L DM ([facility]) - L 12:00 middle depth: persistent irregular mass. US correlate seen below.  2022 L US - L 12:00 N4 (previously 12:00 N1): 0.5 x 0.7 x 0.5 cm irregular hypoechoic mass correlates w/ DM-> L USG-CNB  2022 L USG-CNB ([facility]) - L 12:00 N4 (cork): IDC, well differentiated w/ microcalcs. DCIS low-intermediate grade, cribriform, solid, micropapillary, nuclear grade 1-2, necrosis present, focal single necrosis, microcalcs; LCIS, classic type. -ER %, [place] 71-80%, HER2 1+  She denies any breast symptoms- no masses, skin changes, nipple discharge.   Patient has a history of subclinical hypothyroidism, GERD, osteoporosis, HTN, hyperlipidemia Her medications include chlorthalidone, Pepcid, omeprazole, Gemtesa. Past surgical history include cataract surgery, ovarian cystectomy, tonsillectomy, arthrodesis, breast bx. Patient has a family of breast cancer in mother, age 90s. Denies other cancer history.  2022 B/L contrast enhanced mammo - L upper central bx marker at site of previously bx-proven focus of malignancy. - R upper posterior 0.4 x 0.5 circumscribed nodule, likely correspond with US showing normal-appearing 10:00 LN  2022 R US - R 10:00 N10 0.6 x 0.4 x 0.3 cm normal-appearing LN demonstrating flow to hilum - R 12:00 N3 0.6 x 0.5 x 0.3 cm hypoechoic mildly shadowing mass w/  angular margins-> USG-CNB   2022 R USG-CNB (NW) - R 12:00 N3 (heart):  benign tissue w/ stromal fibrosis, concordant   10/27/2022 L lumpectomy and L SLNB - L SLNB (0/2) negative for metastatic carcinoma - L breast no residual IDC; focal DCIS, nuclear grade1-2, micropapillary, 5 mm. LVI neg. DCIS 1.25 mm from nearest anterior margin. ALH. Bx site changes - L superior and lateral margin focal ALH - L inferior margin focal DCIS, 1 mm from designated margin; ALH - L anterior,  deep, medial margins negative - ER %, CO 71-80%, HER2 neg - AJCC pT1aN0, Stage 1A  2022 (postop): The patient reports that she had significant pain in her left axilla that lasted almost two weeks, but is now resolved. The breast was not very painful in comparison. She took extra-strength Tylenol briefly, but didn't feel that it was helpful. No fevers/chills.  2023: [facility] GT (21 gene panel): DICER1, VUS  2023: Patient met with rad onc and discussed option for omission of RT given age and low risk disease. The patient has opted to omit RT and proceed with adjuvant endocrine therapy. She sought a second opinion with Dr. Jack Persaud with [facility]. Had ctDNA checked-negative. Started anastrazole after all and noting some side effects. Joint aches and constipation, sleep disturbances. Is unsure about continuing with anastrazole.  23 B/L DM  - L postsurgical changes - R biopsy marker in upper central  - R upper outer quadrant stable intramammary lymph node - BR 2   23 B/L US  - R 12:00 N3 corresponding to the site of prior biopsy yielding stromal fibrosis is a clip within a subcentimeter hypoechoic mass, unchanged compared with 2022 - R 10:00 N10 is a benign-appearing intramammary lymph node - L 12:00 N4 is a postsurgical scar - BR 2   2023: Still taking anastrazole--has been noticing hair loss. Other sides effects have since resolved. Denies any breast complaints--no masses, no skin changes, no nipple discharge.  2024: Had switched to exemestane for approx 5 months but had insomnia, joint aches, hair loss. No breast complaints today- no skin changes, no nipple discharge.  2024 B/L DM (NW) revealed heterogeneously dense breasts -L upper central postop changes -R upper central bx clip -R outer posterior IMLN, stable, w/ sono correlate -BR2  2024 B/L US (NW)  -R 12:00 N3 5 mm prior bx site w/ clip -R 10:00 N10 IMLN -L 12:00 N4 scar site -Br2  Interval history: Is no longer on any endocrine therapy, but still feels symptoms of tiredness, achiness in her legs, hips. Symptoms wake up the patient. She also has a flareup of her tendonitis.  Patient denies any breast complaints today. Denies any breast masses, skin changes, or nipple discharge.

## 2024-08-21 NOTE — CONSULT LETTER
[Dear  ___] : Dear  [unfilled], [Courtesy Letter:] : I had the pleasure of seeing your patient, [unfilled], in my office today. [Please see my note below.] : Please see my note below. [Sincerely,] : Sincerely, [DrValente  ___] : Dr. SILVERIO [FreeTextEntry3] : Fatmata Millan MD Breast Surgeon Division of Surgical Oncology Department of Surgery [address] [place] [address]  Tel: [phone] Fax: [phone] Email: [email or web address]

## 2024-08-21 NOTE — ASSESSMENT
[FreeTextEntry1] : The patient is a 79 year F referred by Dr. 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 LVI neg. DCIS 1.25 mm from nearest anterior margin. ALH. Bx site changes - L superior and lateral margin focal ALH - L inferior margin focal DCIS, 1 mm from designated margin; ALH - L anterior,  deep, medial margins negative - ER %, RI 71-80%, HER2 neg - AJCC pT1aN0, Stage 1A   11/9/2022: Exam shows a well-healing incision without evidence for infection, hematoma, or seroma.  1/12/2023: [facility] GT (21 gene panel): DICER1, VUS  Recent imaging reviewed, BR2  Exam today no masses or lymphadenopathy  Plan: - F/u medical oncology - next imaging 7/2025 B/L SM and US - RTO 6 months

## 2024-08-21 NOTE — ASSESSMENT
[FreeTextEntry1] : The patient is a 79 year F referred by Dr. 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 LVI neg. DCIS 1.25 mm from nearest anterior margin. ALH. Bx site changes - L superior and lateral margin focal ALH - L inferior margin focal DCIS, 1 mm from designated margin; ALH - L anterior,  deep, medial margins negative - ER %, MI 71-80%, HER2 neg - AJCC pT1aN0, Stage 1A   11/9/2022: Exam shows a well-healing incision without evidence for infection, hematoma, or seroma.  1/12/2023: [facility] GT (21 gene panel): DICER1, VUS  Recent imaging reviewed, BR2  Exam today no masses or lymphadenopathy  Plan: - F/u medical oncology - next imaging 7/2025 B/L SM and US - RTO 6 months

## 2024-08-30 ENCOUNTER — OUTPATIENT (OUTPATIENT)
Dept: OUTPATIENT SERVICES | Facility: HOSPITAL | Age: 79
LOS: 1 days | End: 2024-08-30
Payer: MEDICARE

## 2024-08-30 ENCOUNTER — APPOINTMENT (OUTPATIENT)
Dept: INTERNAL MEDICINE | Facility: CLINIC | Age: 79
End: 2024-08-30
Payer: MEDICARE

## 2024-08-30 VITALS — SYSTOLIC BLOOD PRESSURE: 136 MMHG | DIASTOLIC BLOOD PRESSURE: 72 MMHG

## 2024-08-30 VITALS — OXYGEN SATURATION: 98 % | HEART RATE: 71 BPM | HEIGHT: 64.5 IN | WEIGHT: 134 LBS | BODY MASS INDEX: 22.6 KG/M2

## 2024-08-30 DIAGNOSIS — Z85.3 PERSONAL HISTORY OF MALIGNANT NEOPLASM OF BREAST: ICD-10-CM

## 2024-08-30 DIAGNOSIS — I10 ESSENTIAL (PRIMARY) HYPERTENSION: ICD-10-CM

## 2024-08-30 DIAGNOSIS — Z98.890 OTHER SPECIFIED POSTPROCEDURAL STATES: Chronic | ICD-10-CM

## 2024-08-30 DIAGNOSIS — M65.4 RADIAL STYLOID TENOSYNOVITIS [DE QUERVAIN]: ICD-10-CM

## 2024-08-30 DIAGNOSIS — Z98.42 CATARACT EXTRACTION STATUS, LEFT EYE: Chronic | ICD-10-CM

## 2024-08-30 DIAGNOSIS — K58.2 MIXED IRRITABLE BOWEL SYNDROME: ICD-10-CM

## 2024-08-30 DIAGNOSIS — Z90.89 ACQUIRED ABSENCE OF OTHER ORGANS: Chronic | ICD-10-CM

## 2024-08-30 DIAGNOSIS — M54.9 DORSALGIA, UNSPECIFIED: ICD-10-CM

## 2024-08-30 DIAGNOSIS — Q76.1 KLIPPEL-FEIL SYNDROME: Chronic | ICD-10-CM

## 2024-08-30 DIAGNOSIS — Z85.828 PERSONAL HISTORY OF OTHER MALIGNANT NEOPLASM OF SKIN: Chronic | ICD-10-CM

## 2024-08-30 DIAGNOSIS — Z98.41 CATARACT EXTRACTION STATUS, RIGHT EYE: Chronic | ICD-10-CM

## 2024-08-30 DIAGNOSIS — M25.60 STIFFNESS OF UNSPECIFIED JOINT, NOT ELSEWHERE CLASSIFIED: ICD-10-CM

## 2024-08-30 PROCEDURE — G0463: CPT

## 2024-08-30 PROCEDURE — 99214 OFFICE O/P EST MOD 30 MIN: CPT

## 2024-08-30 NOTE — REVIEW OF SYSTEMS
Abdomen soft, non-tender, no guarding. [Heartburn] : heartburn [Incontinence] : incontinence [Negative] : Heme/Lymph

## 2024-09-03 DIAGNOSIS — M25.60 STIFFNESS OF UNSPECIFIED JOINT, NOT ELSEWHERE CLASSIFIED: ICD-10-CM

## 2024-09-03 DIAGNOSIS — M65.4 RADIAL STYLOID TENOSYNOVITIS [DE QUERVAIN]: ICD-10-CM

## 2024-09-03 LAB
ALBUMIN SERPL ELPH-MCNC: 4.7 G/DL
ALP BLD-CCNC: 42 U/L
ALT SERPL-CCNC: 19 U/L
ANION GAP SERPL CALC-SCNC: 14 MMOL/L
AST SERPL-CCNC: 23 U/L
BILIRUB SERPL-MCNC: 0.3 MG/DL
BUN SERPL-MCNC: 15 MG/DL
CALCIUM SERPL-MCNC: 9.9 MG/DL
CHLORIDE SERPL-SCNC: 97 MMOL/L
CO2 SERPL-SCNC: 29 MMOL/L
CREAT SERPL-MCNC: 0.73 MG/DL
CRP SERPL-MCNC: <3 MG/L
EGFR: 85 ML/MIN/1.73M2
ERYTHROCYTE [SEDIMENTATION RATE] IN BLOOD BY WESTERGREN METHOD: 3 MM/HR
GLUCOSE SERPL-MCNC: 121 MG/DL
HCT VFR BLD CALC: 41.3 %
HGB BLD-MCNC: 13.7 G/DL
MCHC RBC-ENTMCNC: 31.5 PG
MCHC RBC-ENTMCNC: 33.2 GM/DL
MCV RBC AUTO: 94.9 FL
PLATELET # BLD AUTO: 250 K/UL
POTASSIUM SERPL-SCNC: 4 MMOL/L
PROT SERPL-MCNC: 7.4 G/DL
RBC # BLD: 4.35 M/UL
RBC # FLD: 12.7 %
RHEUMATOID FACT SER QL: <10 IU/ML
SODIUM SERPL-SCNC: 140 MMOL/L
TSH SERPL-ACNC: 3.23 UIU/ML
WBC # FLD AUTO: 10.26 K/UL

## 2024-09-03 NOTE — HEALTH RISK ASSESSMENT
[Intercurrent Urgi Care visits] : went to urgent care [Yes] : Yes [1 or 2 (0 pts)] : 1 or 2 (0 points) [Never (0 pts)] : Never (0 points) [No] : In the past 12 months have you used drugs other than those required for medical reasons? No [No falls in past year] : Patient reported no falls in the past year [0] : 2) Feeling down, depressed, or hopeless: Not at all (0) [PHQ-2 Negative - No further assessment needed] : PHQ-2 Negative - No further assessment needed [Never] : Never [de-identified] : Urgent- URI [de-identified] : Breast Surg; Ortho; GI; ENT- notes reviewed [Audit-CScore] : 4 [de-identified] : watching grandchildren- active [de-identified] : Eats all foods- low fat- a lot of fruits and vegetables [de-identified] : 10/20- tripped over something left on floor by grandchild- no significant injury [facility]] : [facility] [[id number]] : 0

## 2024-09-03 NOTE — HISTORY OF PRESENT ILLNESS
[FreeTextEntry1] : 78 yo generally helahty female for scheduled appt due to complaints of joint pain and faiigue [de-identified] : Last seen bey me in June. Patient has been generally well  Adherent with medications as noted without side effects. Appetite good and weight reportedly stable. Active with good exercise tolerance. No sx of chest pain, sob, palpitations, orthopnea, PND, HECTOR, edema, lightheadedness..  States that since diarrheal illness in March- has not "felt like myself"-Aches and pains and less energy- more fatigue  Aches and pains involve: thumbs, - cortisone shots- transiently helped- also pains in muscles- upper arms; LBP with sciatica  Hsa morning stiffness- all over- gets better as day goes on Hs not taken any OTC meds- tried some topical meds- minimal relief. Had beenon antiestrogen from Jan 2023- June 2023- stopped  NO hadache, scalp terderness, visual issues or famliy history of rheumatic diseases Also states she was dx'd with fibromylagia when in her 20's

## 2024-09-03 NOTE — PHYSICAL EXAM
[Well Nourished] : well nourished [Well Developed] : well developed [Well-Appearing] : well-appearing [Normal Sclera/Conjunctiva] : normal sclera/conjunctiva [PERRL] : pupils equal round and reactive to light [EOMI] : extraocular movements intact [Normal Outer Ear/Nose] : the outer ears and nose were normal in appearance [Normal Oropharynx] : the oropharynx was normal [No JVD] : no jugular venous distention [No Lymphadenopathy] : no lymphadenopathy [Supple] : supple [Thyroid Normal, No Nodules] : the thyroid was normal and there were no nodules present [No Respiratory Distress] : no respiratory distress  [No Accessory Muscle Use] : no accessory muscle use [Clear to Auscultation] : lungs were clear to auscultation bilaterally [Normal Rate] : normal rate  [Regular Rhythm] : with a regular rhythm [Normal S1, S2] : normal S1 and S2 [No Murmur] : no murmur heard [No Carotid Bruits] : no carotid bruits [No Abdominal Bruit] : a ~M bruit was not heard ~T in the abdomen [Pedal Pulses Present] : the pedal pulses are present [No Edema] : there was no peripheral edema [No Palpable Aorta] : no palpable aorta [No Extremity Clubbing/Cyanosis] : no extremity clubbing/cyanosis [Soft] : abdomen soft [Non Tender] : non-tender [Non-distended] : non-distended [No Masses] : no abdominal mass palpated [No HSM] : no HSM [Normal Bowel Sounds] : normal bowel sounds [No CVA Tenderness] : no CVA  tenderness [No Spinal Tenderness] : no spinal tenderness [No Joint Swelling] : no joint swelling [Grossly Normal Strength/Tone] : grossly normal strength/tone [No Rash] : no rash [Coordination Grossly Intact] : coordination grossly intact [No Focal Deficits] : no focal deficits [Normal Gait] : normal gait [Deep Tendon Reflexes (DTR)] : deep tendon reflexes were 2+ and symmetric [Normal Affect] : the affect was normal [Normal Insight/Judgement] : insight and judgment were intact [No Acute Distress] : no acute distress [Normal Voice/Communication] : normal voice/communication [No Skin Lesions] : no skin lesions [de-identified] : NO scalp tenderness or temporal artery tenderness [de-identified] : LE varicosities [de-identified] : scar around left areolar- site of lumpectomy incision [de-identified] : OA changes of PIP and DIP joints NO joint redness , swelling or tenderness

## 2024-09-03 NOTE — HEALTH RISK ASSESSMENT
[Intercurrent Urgi Care visits] : went to urgent care [Yes] : Yes [1 or 2 (0 pts)] : 1 or 2 (0 points) [Never (0 pts)] : Never (0 points) [No] : In the past 12 months have you used drugs other than those required for medical reasons? No [No falls in past year] : Patient reported no falls in the past year [0] : 2) Feeling down, depressed, or hopeless: Not at all (0) [PHQ-2 Negative - No further assessment needed] : PHQ-2 Negative - No further assessment needed [Never] : Never [de-identified] : Urgent- URI [de-identified] : Breast Surg; Ortho; GI; ENT- notes reviewed [Audit-CScore] : 4 [de-identified] : watching grandchildren- active [de-identified] : Eats all foods- low fat- a lot of fruits and vegetables [de-identified] : 10/20- tripped over something left on floor by grandchild- no significant injury [facility] [[id number]] : 0

## 2024-09-03 NOTE — HISTORY OF PRESENT ILLNESS
[FreeTextEntry1] : 80 yo generally helahty female for scheduled appt due to complaints of joint pain and faiigue [de-identified] : Last seen bey me in June. Patient has been generally well  Adherent with medications as noted without side effects. Appetite good and weight reportedly stable. Active with good exercise tolerance. No sx of chest pain, sob, palpitations, orthopnea, PND, HECTOR, edema, lightheadedness..  States that since diarrheal illness in March- has not "felt like myself"-Aches and pains and less energy- more fatigue  Aches and pains involve: thumbs, - cortisone shots- transiently helped- also pains in muscles- upper arms; LBP with sciatica  Hsa morning stiffness- all over- gets better as day goes on Hs not taken any OTC meds- tried some topical meds- minimal relief. Had beenon antiestrogen from Jan 2023- June 2023- stopped  NO hadache, scalp terderness, visual issues or famliy history of rheumatic diseases Also states she was dx'd with fibromylagia when in her 20's

## 2024-09-03 NOTE — ASSESSMENT
[FreeTextEntry1] :  8. Hypertension with reactive component- BP ok today  8. Fatigue- and joint pain- now states ever since diarrheal illness in March- with am stiffness and some proximal upper extremity pain- r/o PMR- will check ESR, CRP, CBC and RF  NO sx of TA- discussed possible dx and Rx wtih steroids- if ESR or CRP elevated will start at 15 mg prednison- if neg, consider low dose pred vs other analgesia???  WIll call with results  f/u 3 mos scheduled  9/3- Called pt and reviewed labs- essentially all normal- Reassurance- Situation discussed at length- main issue is right thumb base and sciatica- discussed non pharmacologic intervention and medications- duloxetine or gabapentin- will consider and let me know

## 2024-09-03 NOTE — PHYSICAL EXAM
[Well Nourished] : well nourished [Well Developed] : well developed [Well-Appearing] : well-appearing [Normal Sclera/Conjunctiva] : normal sclera/conjunctiva [PERRL] : pupils equal round and reactive to light [EOMI] : extraocular movements intact [Normal Outer Ear/Nose] : the outer ears and nose were normal in appearance [Normal Oropharynx] : the oropharynx was normal [No JVD] : no jugular venous distention [No Lymphadenopathy] : no lymphadenopathy [Supple] : supple [Thyroid Normal, No Nodules] : the thyroid was normal and there were no nodules present [No Respiratory Distress] : no respiratory distress  [No Accessory Muscle Use] : no accessory muscle use [Clear to Auscultation] : lungs were clear to auscultation bilaterally [Normal Rate] : normal rate  [Regular Rhythm] : with a regular rhythm [Normal S1, S2] : normal S1 and S2 [No Murmur] : no murmur heard [No Carotid Bruits] : no carotid bruits [No Abdominal Bruit] : a ~M bruit was not heard ~T in the abdomen [Pedal Pulses Present] : the pedal pulses are present [No Edema] : there was no peripheral edema [No Palpable Aorta] : no palpable aorta [No Extremity Clubbing/Cyanosis] : no extremity clubbing/cyanosis [Soft] : abdomen soft [Non Tender] : non-tender [Non-distended] : non-distended [No Masses] : no abdominal mass palpated [No HSM] : no HSM [Normal Bowel Sounds] : normal bowel sounds [No CVA Tenderness] : no CVA  tenderness [No Spinal Tenderness] : no spinal tenderness [No Joint Swelling] : no joint swelling [Grossly Normal Strength/Tone] : grossly normal strength/tone [No Rash] : no rash [Coordination Grossly Intact] : coordination grossly intact [No Focal Deficits] : no focal deficits [Normal Gait] : normal gait [Deep Tendon Reflexes (DTR)] : deep tendon reflexes were 2+ and symmetric [Normal Affect] : the affect was normal [Normal Insight/Judgement] : insight and judgment were intact [No Acute Distress] : no acute distress [Normal Voice/Communication] : normal voice/communication [No Skin Lesions] : no skin lesions [de-identified] : NO scalp tenderness or temporal artery tenderness [de-identified] : LE varicosities [de-identified] : scar around left areolar- site of lumpectomy incision [de-identified] : OA changes of PIP and DIP joints NO joint redness , swelling or tenderness

## 2024-09-09 ENCOUNTER — RX RENEWAL (OUTPATIENT)
Age: 79
End: 2024-09-09

## 2024-12-13 ENCOUNTER — OUTPATIENT (OUTPATIENT)
Dept: OUTPATIENT SERVICES | Facility: HOSPITAL | Age: 79
LOS: 1 days | End: 2024-12-13
Payer: COMMERCIAL

## 2024-12-13 ENCOUNTER — NON-APPOINTMENT (OUTPATIENT)
Age: 79
End: 2024-12-13

## 2024-12-13 ENCOUNTER — APPOINTMENT (OUTPATIENT)
Dept: INTERNAL MEDICINE | Facility: CLINIC | Age: 79
End: 2024-12-13
Payer: MEDICARE

## 2024-12-13 VITALS
HEIGHT: 64.5 IN | OXYGEN SATURATION: 98 % | HEART RATE: 68 BPM | BODY MASS INDEX: 21.93 KG/M2 | WEIGHT: 130 LBS | SYSTOLIC BLOOD PRESSURE: 128 MMHG | DIASTOLIC BLOOD PRESSURE: 60 MMHG

## 2024-12-13 DIAGNOSIS — Z85.828 PERSONAL HISTORY OF OTHER MALIGNANT NEOPLASM OF SKIN: Chronic | ICD-10-CM

## 2024-12-13 DIAGNOSIS — Z98.890 OTHER SPECIFIED POSTPROCEDURAL STATES: Chronic | ICD-10-CM

## 2024-12-13 DIAGNOSIS — Z98.41 CATARACT EXTRACTION STATUS, RIGHT EYE: Chronic | ICD-10-CM

## 2024-12-13 DIAGNOSIS — Q76.1 KLIPPEL-FEIL SYNDROME: Chronic | ICD-10-CM

## 2024-12-13 DIAGNOSIS — Z98.42 CATARACT EXTRACTION STATUS, LEFT EYE: Chronic | ICD-10-CM

## 2024-12-13 DIAGNOSIS — M65.4 RADIAL STYLOID TENOSYNOVITIS [DE QUERVAIN]: ICD-10-CM

## 2024-12-13 DIAGNOSIS — Z90.89 ACQUIRED ABSENCE OF OTHER ORGANS: Chronic | ICD-10-CM

## 2024-12-13 DIAGNOSIS — I10 ESSENTIAL (PRIMARY) HYPERTENSION: ICD-10-CM

## 2024-12-13 DIAGNOSIS — K21.9 GASTRO-ESOPHAGEAL REFLUX DISEASE W/OUT ESOPHAGITIS: ICD-10-CM

## 2024-12-13 DIAGNOSIS — E03.8 OTHER SPECIFIED HYPOTHYROIDISM: ICD-10-CM

## 2024-12-13 DIAGNOSIS — M81.0 AGE-RELATED OSTEOPOROSIS W/OUT CURRENT PATHOLOGICAL FRACTURE: ICD-10-CM

## 2024-12-13 DIAGNOSIS — E78.5 HYPERLIPIDEMIA, UNSPECIFIED: ICD-10-CM

## 2024-12-13 DIAGNOSIS — K59.09 OTHER CONSTIPATION: ICD-10-CM

## 2024-12-13 DIAGNOSIS — R73.01 IMPAIRED FASTING GLUCOSE: ICD-10-CM

## 2024-12-13 PROCEDURE — 93010 ELECTROCARDIOGRAM REPORT: CPT

## 2024-12-13 PROCEDURE — G0439: CPT

## 2024-12-23 DIAGNOSIS — R73.01 IMPAIRED FASTING GLUCOSE: ICD-10-CM

## 2024-12-23 DIAGNOSIS — K21.9 GASTRO-ESOPHAGEAL REFLUX DISEASE WITHOUT ESOPHAGITIS: ICD-10-CM

## 2024-12-23 DIAGNOSIS — Z00.00 ENCOUNTER FOR GENERAL ADULT MEDICAL EXAMINATION WITHOUT ABNORMAL FINDINGS: ICD-10-CM

## 2024-12-23 DIAGNOSIS — M65.4 RADIAL STYLOID TENOSYNOVITIS [DE QUERVAIN]: ICD-10-CM

## 2024-12-23 DIAGNOSIS — K59.09 OTHER CONSTIPATION: ICD-10-CM

## 2024-12-23 DIAGNOSIS — E78.5 HYPERLIPIDEMIA, UNSPECIFIED: ICD-10-CM

## 2024-12-23 DIAGNOSIS — E03.8 OTHER SPECIFIED HYPOTHYROIDISM: ICD-10-CM

## 2024-12-23 DIAGNOSIS — M81.0 AGE-RELATED OSTEOPOROSIS WITHOUT CURRENT PATHOLOGICAL FRACTURE: ICD-10-CM

## 2025-02-02 ENCOUNTER — NON-APPOINTMENT (OUTPATIENT)
Age: 80
End: 2025-02-02

## 2025-02-12 ENCOUNTER — APPOINTMENT (OUTPATIENT)
Dept: GASTROENTEROLOGY | Facility: CLINIC | Age: 80
End: 2025-02-12

## 2025-02-13 NOTE — ASSESSMENT
[FreeTextEntry1] : This is a 76-year-old female with chronic GERD on famotidine 20 mg twice daily and omeprazole 3 times a week. She is not willing to come off PPI therapy. She has been made aware of possible complications of PPI therapy including but not limited to bone loss and formation of gastric polyps. She has benign gastric polyps. H. pylori negative on gastric biopsies. She has most probable constipation predominant IBS with symptoms of gas, bloating abdominal discomfort and constipation. I offered for her to undergo anorectal manometry to rule out dyssynergic defecation. She may benefit from pelvic floor rehabilitation. She does not want undergo anorectal manometry at this time. She is to call if she has any questions or concerns. Otherwise I will see her for follow-up in 1 year.
Pt presents to the ED c/o l arm and l leg numbness for "about forty five minutes." She adds that she take "a muscle relaxer about an hour ago for stiffness in my neck." PT is ambulatory with steady gait. Speech is clear, face is symmetrical. Denies headache or vision changes. Denies nausea or vomiting.

## 2025-02-17 ENCOUNTER — NON-APPOINTMENT (OUTPATIENT)
Age: 80
End: 2025-02-17

## 2025-02-19 ENCOUNTER — APPOINTMENT (OUTPATIENT)
Dept: GASTROENTEROLOGY | Facility: CLINIC | Age: 80
End: 2025-02-19
Payer: MEDICARE

## 2025-02-19 VITALS
HEART RATE: 82 BPM | DIASTOLIC BLOOD PRESSURE: 64 MMHG | HEIGHT: 64.5 IN | OXYGEN SATURATION: 98 % | WEIGHT: 130 LBS | SYSTOLIC BLOOD PRESSURE: 142 MMHG | RESPIRATION RATE: 17 BRPM | BODY MASS INDEX: 21.93 KG/M2

## 2025-02-19 DIAGNOSIS — K21.9 GASTRO-ESOPHAGEAL REFLUX DISEASE W/OUT ESOPHAGITIS: ICD-10-CM

## 2025-02-19 DIAGNOSIS — K58.9 IRRITABLE BOWEL SYNDROME, UNSPECIFIED: ICD-10-CM

## 2025-02-19 PROBLEM — C50.912 INVASIVE DUCTAL CARCINOMA OF BREAST, LEFT: Status: ACTIVE | Noted: 2025-02-17

## 2025-02-19 PROCEDURE — G2211 COMPLEX E/M VISIT ADD ON: CPT

## 2025-02-19 PROCEDURE — 99213 OFFICE O/P EST LOW 20 MIN: CPT

## 2025-02-26 ENCOUNTER — APPOINTMENT (OUTPATIENT)
Dept: SURGICAL ONCOLOGY | Facility: CLINIC | Age: 80
End: 2025-02-26
Payer: MEDICARE

## 2025-02-26 ENCOUNTER — NON-APPOINTMENT (OUTPATIENT)
Age: 80
End: 2025-02-26

## 2025-02-26 VITALS
RESPIRATION RATE: 17 BRPM | BODY MASS INDEX: 22.02 KG/M2 | HEART RATE: 85 BPM | HEIGHT: 64 IN | WEIGHT: 129 LBS | SYSTOLIC BLOOD PRESSURE: 146 MMHG | DIASTOLIC BLOOD PRESSURE: 78 MMHG | OXYGEN SATURATION: 99 %

## 2025-02-26 DIAGNOSIS — C50.912 MALIGNANT NEOPLASM OF UNSPECIFIED SITE OF LEFT FEMALE BREAST: ICD-10-CM

## 2025-02-26 PROCEDURE — 99214 OFFICE O/P EST MOD 30 MIN: CPT

## 2025-05-19 ENCOUNTER — RX RENEWAL (OUTPATIENT)
Age: 80
End: 2025-05-19

## 2025-06-05 PROBLEM — M25.60 MORNING JOINT STIFFNESS OF MULTIPLE SITES: Status: RESOLVED | Noted: 2024-08-30 | Resolved: 2025-06-05

## 2025-06-06 ENCOUNTER — APPOINTMENT (OUTPATIENT)
Dept: INTERNAL MEDICINE | Facility: CLINIC | Age: 80
End: 2025-06-06
Payer: MEDICARE

## 2025-06-06 ENCOUNTER — OUTPATIENT (OUTPATIENT)
Dept: OUTPATIENT SERVICES | Facility: HOSPITAL | Age: 80
LOS: 1 days | End: 2025-06-06
Payer: MEDICARE

## 2025-06-06 VITALS — SYSTOLIC BLOOD PRESSURE: 142 MMHG | DIASTOLIC BLOOD PRESSURE: 78 MMHG

## 2025-06-06 DIAGNOSIS — K21.9 GASTRO-ESOPHAGEAL REFLUX DISEASE W/OUT ESOPHAGITIS: ICD-10-CM

## 2025-06-06 DIAGNOSIS — Z85.828 PERSONAL HISTORY OF OTHER MALIGNANT NEOPLASM OF SKIN: Chronic | ICD-10-CM

## 2025-06-06 DIAGNOSIS — Z98.41 CATARACT EXTRACTION STATUS, RIGHT EYE: Chronic | ICD-10-CM

## 2025-06-06 DIAGNOSIS — I10 ESSENTIAL (PRIMARY) HYPERTENSION: ICD-10-CM

## 2025-06-06 DIAGNOSIS — E03.8 OTHER SPECIFIED HYPOTHYROIDISM: ICD-10-CM

## 2025-06-06 DIAGNOSIS — E78.5 HYPERLIPIDEMIA, UNSPECIFIED: ICD-10-CM

## 2025-06-06 DIAGNOSIS — Z98.890 OTHER SPECIFIED POSTPROCEDURAL STATES: Chronic | ICD-10-CM

## 2025-06-06 DIAGNOSIS — Z90.89 ACQUIRED ABSENCE OF OTHER ORGANS: Chronic | ICD-10-CM

## 2025-06-06 DIAGNOSIS — Q76.1 KLIPPEL-FEIL SYNDROME: Chronic | ICD-10-CM

## 2025-06-06 DIAGNOSIS — Z00.00 ENCOUNTER FOR GENERAL ADULT MEDICAL EXAMINATION W/OUT ABNORMAL FINDINGS: ICD-10-CM

## 2025-06-06 DIAGNOSIS — Z98.42 CATARACT EXTRACTION STATUS, LEFT EYE: Chronic | ICD-10-CM

## 2025-06-06 DIAGNOSIS — K58.9 IRRITABLE BOWEL SYNDROME, UNSPECIFIED: ICD-10-CM

## 2025-06-06 DIAGNOSIS — M25.60 STIFFNESS OF UNSPECIFIED JOINT, NOT ELSEWHERE CLASSIFIED: ICD-10-CM

## 2025-06-06 DIAGNOSIS — R73.01 IMPAIRED FASTING GLUCOSE: ICD-10-CM

## 2025-06-06 DIAGNOSIS — C50.912 MALIGNANT NEOPLASM OF UNSPECIFIED SITE OF LEFT FEMALE BREAST: ICD-10-CM

## 2025-06-06 PROCEDURE — 99214 OFFICE O/P EST MOD 30 MIN: CPT

## 2025-06-06 PROCEDURE — G0463: CPT

## 2025-06-06 PROCEDURE — G2211 COMPLEX E/M VISIT ADD ON: CPT

## 2025-06-13 ENCOUNTER — APPOINTMENT (OUTPATIENT)
Dept: INTERNAL MEDICINE | Facility: CLINIC | Age: 80
End: 2025-06-13

## 2025-06-19 DIAGNOSIS — K58.9 IRRITABLE BOWEL SYNDROME, UNSPECIFIED: ICD-10-CM

## 2025-06-19 DIAGNOSIS — M81.0 AGE-RELATED OSTEOPOROSIS WITHOUT CURRENT PATHOLOGICAL FRACTURE: ICD-10-CM

## 2025-06-19 DIAGNOSIS — E78.5 HYPERLIPIDEMIA, UNSPECIFIED: ICD-10-CM

## 2025-06-19 DIAGNOSIS — R73.01 IMPAIRED FASTING GLUCOSE: ICD-10-CM

## 2025-06-19 DIAGNOSIS — E03.8 OTHER SPECIFIED HYPOTHYROIDISM: ICD-10-CM

## 2025-06-19 DIAGNOSIS — C50.912 MALIGNANT NEOPLASM OF UNSPECIFIED SITE OF LEFT FEMALE BREAST: ICD-10-CM

## 2025-06-19 DIAGNOSIS — K21.9 GASTRO-ESOPHAGEAL REFLUX DISEASE WITHOUT ESOPHAGITIS: ICD-10-CM

## 2025-06-19 DIAGNOSIS — Z00.00 ENCOUNTER FOR GENERAL ADULT MEDICAL EXAMINATION WITHOUT ABNORMAL FINDINGS: ICD-10-CM

## 2025-07-18 ENCOUNTER — APPOINTMENT (OUTPATIENT)
Dept: ENDOCRINOLOGY | Facility: CLINIC | Age: 80
End: 2025-07-18
Payer: MEDICARE

## 2025-07-18 VITALS
HEART RATE: 84 BPM | SYSTOLIC BLOOD PRESSURE: 128 MMHG | DIASTOLIC BLOOD PRESSURE: 70 MMHG | HEIGHT: 64.2 IN | OXYGEN SATURATION: 98 % | BODY MASS INDEX: 21.85 KG/M2 | WEIGHT: 128 LBS

## 2025-07-18 PROCEDURE — 77080 DXA BONE DENSITY AXIAL: CPT | Mod: GA

## 2025-07-18 PROCEDURE — 99214 OFFICE O/P EST MOD 30 MIN: CPT

## 2025-07-18 PROCEDURE — 77089 TBS DXA CAL W/I&R FX RISK: CPT | Mod: GA

## 2025-07-18 PROCEDURE — ZZZZZ: CPT

## 2025-07-18 PROCEDURE — G2211 COMPLEX E/M VISIT ADD ON: CPT

## 2025-07-31 ENCOUNTER — OUTPATIENT (OUTPATIENT)
Dept: OUTPATIENT SERVICES | Facility: HOSPITAL | Age: 80
LOS: 1 days | End: 2025-07-31
Payer: MEDICARE

## 2025-07-31 ENCOUNTER — APPOINTMENT (OUTPATIENT)
Dept: MAMMOGRAPHY | Facility: IMAGING CENTER | Age: 80
End: 2025-07-31
Payer: MEDICARE

## 2025-07-31 ENCOUNTER — RESULT REVIEW (OUTPATIENT)
Age: 80
End: 2025-07-31

## 2025-07-31 ENCOUNTER — APPOINTMENT (OUTPATIENT)
Dept: ULTRASOUND IMAGING | Facility: IMAGING CENTER | Age: 80
End: 2025-07-31
Payer: MEDICARE

## 2025-07-31 DIAGNOSIS — Z90.89 ACQUIRED ABSENCE OF OTHER ORGANS: Chronic | ICD-10-CM

## 2025-07-31 DIAGNOSIS — Z98.890 OTHER SPECIFIED POSTPROCEDURAL STATES: Chronic | ICD-10-CM

## 2025-07-31 DIAGNOSIS — Q76.1 KLIPPEL-FEIL SYNDROME: Chronic | ICD-10-CM

## 2025-07-31 DIAGNOSIS — C50.912 MALIGNANT NEOPLASM OF UNSPECIFIED SITE OF LEFT FEMALE BREAST: ICD-10-CM

## 2025-07-31 DIAGNOSIS — Z98.41 CATARACT EXTRACTION STATUS, RIGHT EYE: Chronic | ICD-10-CM

## 2025-07-31 DIAGNOSIS — Z98.42 CATARACT EXTRACTION STATUS, LEFT EYE: Chronic | ICD-10-CM

## 2025-07-31 DIAGNOSIS — Z85.828 PERSONAL HISTORY OF OTHER MALIGNANT NEOPLASM OF SKIN: Chronic | ICD-10-CM

## 2025-07-31 PROCEDURE — G0279: CPT | Mod: 26

## 2025-07-31 PROCEDURE — 77066 DX MAMMO INCL CAD BI: CPT

## 2025-07-31 PROCEDURE — 77066 DX MAMMO INCL CAD BI: CPT | Mod: 26

## 2025-07-31 PROCEDURE — G0279: CPT

## 2025-07-31 PROCEDURE — 76641 ULTRASOUND BREAST COMPLETE: CPT | Mod: 26,50,GA

## 2025-07-31 PROCEDURE — 76641 ULTRASOUND BREAST COMPLETE: CPT

## (undated) DEVICE — DRAIN JACKSON PRATT 10MM FLAT FULL NO TROCAR

## (undated) DEVICE — STAPLER SKIN VISI-STAT 35 WIDE

## (undated) DEVICE — ELCTR EDGE BOVIE INSULATED BLADE TIP 2.75"

## (undated) DEVICE — POSITIONER STRAP ARMBOARD VELCRO TS-30 12

## (undated) DEVICE — DRSG 4X4

## (undated) DEVICE — PACK MINOR

## (undated) DEVICE — DRAIN RESERVOIR FOR JACKSON PRATT 100CC CARDINAL

## (undated) DEVICE — BLANKET WARMER LOWER ADULT

## (undated) DEVICE — SUT MONOCRYL 3-0 18" PS-2 UNDYED

## (undated) DEVICE — SUT POLYSORB 3-0 30" V-20 UNDYED

## (undated) DEVICE — POSITIONER FOAM HEAD CRADLE

## (undated) DEVICE — POSITIONER PATIENT SAFETY STRAP 3X60"

## (undated) DEVICE — DRSG STERISTRIPS 0.5X4"

## (undated) DEVICE — SOL IRR POUR H2O 1500ML

## (undated) DEVICE — DRAPE CHEST BREAST 106X122"

## (undated) DEVICE — WRAP COMPRESSION CALF LG

## (undated) DEVICE — GLV 6.5 PROTEXIS

## (undated) DEVICE — GLV 7.5 ULTRAFREE MAX

## (undated) DEVICE — SOLIDIFIER CANN EXPRESS 3K

## (undated) DEVICE — SUT POLYSORB 2-0 30" V-20 UNDYED

## (undated) DEVICE — SUT POLYSORB 4-0 30" C-13 UNDYED

## (undated) DEVICE — SUT MONOCRYL 4-0 27" PS-2 UNDYED

## (undated) DEVICE — GLV 7.5 PROTEXIS

## (undated) DEVICE — CANISTER SUCTION LID GUARD 3000CC

## (undated) DEVICE — DRAPE 3/4 SHEET W REINFORCEMENT 56X77"

## (undated) DEVICE — DRAPE TOWEL BLUE 17" X 24"